# Patient Record
Sex: MALE | Race: WHITE | NOT HISPANIC OR LATINO | URBAN - METROPOLITAN AREA
[De-identification: names, ages, dates, MRNs, and addresses within clinical notes are randomized per-mention and may not be internally consistent; named-entity substitution may affect disease eponyms.]

---

## 2017-01-23 ENCOUNTER — OUTPATIENT (OUTPATIENT)
Dept: OUTPATIENT SERVICES | Facility: HOSPITAL | Age: 47
LOS: 1 days | Discharge: HOME | End: 2017-01-23

## 2017-06-27 DIAGNOSIS — F11.20 OPIOID DEPENDENCE, UNCOMPLICATED: ICD-10-CM

## 2019-10-11 ENCOUNTER — INPATIENT (INPATIENT)
Facility: HOSPITAL | Age: 49
LOS: 5 days | Discharge: NURSING, REHAB OR LONG-TERM CARE | End: 2019-10-17
Attending: INTERNAL MEDICINE | Admitting: INTERNAL MEDICINE
Payer: MEDICAID

## 2019-10-11 VITALS
HEART RATE: 123 BPM | RESPIRATION RATE: 24 BRPM | OXYGEN SATURATION: 97 % | SYSTOLIC BLOOD PRESSURE: 165 MMHG | TEMPERATURE: 98 F | DIASTOLIC BLOOD PRESSURE: 90 MMHG

## 2019-10-11 DIAGNOSIS — Z98.890 OTHER SPECIFIED POSTPROCEDURAL STATES: Chronic | ICD-10-CM

## 2019-10-11 LAB
ALBUMIN SERPL ELPH-MCNC: 4.3 G/DL — SIGNIFICANT CHANGE UP (ref 3.5–5.2)
ALP SERPL-CCNC: 117 U/L — HIGH (ref 30–115)
ALT FLD-CCNC: 84 U/L — HIGH (ref 0–41)
ANION GAP SERPL CALC-SCNC: 16 MMOL/L — HIGH (ref 7–14)
APPEARANCE UR: CLEAR — SIGNIFICANT CHANGE UP
APTT BLD: 31.2 SEC — SIGNIFICANT CHANGE UP (ref 27–39.2)
AST SERPL-CCNC: 144 U/L — HIGH (ref 0–41)
BASE EXCESS BLDV CALC-SCNC: 3.5 MMOL/L — HIGH (ref -2–2)
BASOPHILS # BLD AUTO: 0.01 K/UL — SIGNIFICANT CHANGE UP (ref 0–0.2)
BASOPHILS NFR BLD AUTO: 0.2 % — SIGNIFICANT CHANGE UP (ref 0–1)
BILIRUB SERPL-MCNC: 1.7 MG/DL — HIGH (ref 0.2–1.2)
BILIRUB UR-MCNC: NEGATIVE — SIGNIFICANT CHANGE UP
BLD GP AB SCN SERPL QL: SIGNIFICANT CHANGE UP
BUN SERPL-MCNC: 17 MG/DL — SIGNIFICANT CHANGE UP (ref 10–20)
CA-I SERPL-SCNC: 1.17 MMOL/L — SIGNIFICANT CHANGE UP (ref 1.12–1.3)
CALCIUM SERPL-MCNC: 9.3 MG/DL — SIGNIFICANT CHANGE UP (ref 8.5–10.1)
CHLORIDE SERPL-SCNC: 94 MMOL/L — LOW (ref 98–110)
CK SERPL-CCNC: 199 U/L — SIGNIFICANT CHANGE UP (ref 0–225)
CO2 SERPL-SCNC: 26 MMOL/L — SIGNIFICANT CHANGE UP (ref 17–32)
COLOR SPEC: YELLOW — SIGNIFICANT CHANGE UP
CREAT SERPL-MCNC: 0.9 MG/DL — SIGNIFICANT CHANGE UP (ref 0.7–1.5)
DIFF PNL FLD: NEGATIVE — SIGNIFICANT CHANGE UP
EOSINOPHIL # BLD AUTO: 0.04 K/UL — SIGNIFICANT CHANGE UP (ref 0–0.7)
EOSINOPHIL NFR BLD AUTO: 0.8 % — SIGNIFICANT CHANGE UP (ref 0–8)
ETHANOL SERPL-MCNC: 79 MG/DL — HIGH
GAS PNL BLDV: 139 MMOL/L — SIGNIFICANT CHANGE UP (ref 136–145)
GAS PNL BLDV: SIGNIFICANT CHANGE UP
GLUCOSE SERPL-MCNC: 130 MG/DL — HIGH (ref 70–99)
GLUCOSE UR QL: NEGATIVE — SIGNIFICANT CHANGE UP
HCO3 BLDV-SCNC: 30 MMOL/L — HIGH (ref 22–29)
HCT VFR BLD CALC: 40.8 % — LOW (ref 42–52)
HCT VFR BLDA CALC: 45.3 % — HIGH (ref 34–44)
HGB BLD CALC-MCNC: 14.8 G/DL — SIGNIFICANT CHANGE UP (ref 14–18)
HGB BLD-MCNC: 14.3 G/DL — SIGNIFICANT CHANGE UP (ref 14–18)
IMM GRANULOCYTES NFR BLD AUTO: 0.2 % — SIGNIFICANT CHANGE UP (ref 0.1–0.3)
INR BLD: 1.15 RATIO — SIGNIFICANT CHANGE UP (ref 0.65–1.3)
KETONES UR-MCNC: NEGATIVE — SIGNIFICANT CHANGE UP
LACTATE BLDV-MCNC: 3 MMOL/L — HIGH (ref 0.5–1.6)
LACTATE SERPL-SCNC: 2.8 MMOL/L — HIGH (ref 0.5–2.2)
LEUKOCYTE ESTERASE UR-ACNC: NEGATIVE — SIGNIFICANT CHANGE UP
LIDOCAIN IGE QN: 14 U/L — SIGNIFICANT CHANGE UP (ref 7–60)
LYMPHOCYTES # BLD AUTO: 3.18 K/UL — SIGNIFICANT CHANGE UP (ref 1.2–3.4)
LYMPHOCYTES # BLD AUTO: 60.6 % — HIGH (ref 20.5–51.1)
MCHC RBC-ENTMCNC: 32.4 PG — HIGH (ref 27–31)
MCHC RBC-ENTMCNC: 35 G/DL — SIGNIFICANT CHANGE UP (ref 32–37)
MCV RBC AUTO: 92.5 FL — SIGNIFICANT CHANGE UP (ref 80–94)
MONOCYTES # BLD AUTO: 0.56 K/UL — SIGNIFICANT CHANGE UP (ref 0.1–0.6)
MONOCYTES NFR BLD AUTO: 10.7 % — HIGH (ref 1.7–9.3)
NEUTROPHILS # BLD AUTO: 1.45 K/UL — SIGNIFICANT CHANGE UP (ref 1.4–6.5)
NEUTROPHILS NFR BLD AUTO: 27.5 % — LOW (ref 42.2–75.2)
NITRITE UR-MCNC: NEGATIVE — SIGNIFICANT CHANGE UP
NRBC # BLD: 0 /100 WBCS — SIGNIFICANT CHANGE UP (ref 0–0)
PCO2 BLDV: 53 MMHG — HIGH (ref 41–51)
PH BLDV: 7.36 — SIGNIFICANT CHANGE UP (ref 7.26–7.43)
PH UR: 6 — SIGNIFICANT CHANGE UP (ref 5–8)
PLATELET # BLD AUTO: 73 K/UL — LOW (ref 130–400)
PO2 BLDV: 17 MMHG — LOW (ref 20–40)
POTASSIUM BLDV-SCNC: 4 MMOL/L — SIGNIFICANT CHANGE UP (ref 3.3–5.6)
POTASSIUM SERPL-MCNC: 4.1 MMOL/L — SIGNIFICANT CHANGE UP (ref 3.5–5)
POTASSIUM SERPL-SCNC: 4.1 MMOL/L — SIGNIFICANT CHANGE UP (ref 3.5–5)
PROT SERPL-MCNC: 7 G/DL — SIGNIFICANT CHANGE UP (ref 6–8)
PROT UR-MCNC: SIGNIFICANT CHANGE UP
PROTHROM AB SERPL-ACNC: 13.2 SEC — HIGH (ref 9.95–12.87)
RBC # BLD: 4.41 M/UL — LOW (ref 4.7–6.1)
RBC # FLD: 13.7 % — SIGNIFICANT CHANGE UP (ref 11.5–14.5)
SAO2 % BLDV: 18 % — SIGNIFICANT CHANGE UP
SODIUM SERPL-SCNC: 136 MMOL/L — SIGNIFICANT CHANGE UP (ref 135–146)
SP GR SPEC: 1.05 — HIGH (ref 1.01–1.02)
UROBILINOGEN FLD QL: ABNORMAL
WBC # BLD: 5.25 K/UL — SIGNIFICANT CHANGE UP (ref 4.8–10.8)
WBC # FLD AUTO: 5.25 K/UL — SIGNIFICANT CHANGE UP (ref 4.8–10.8)

## 2019-10-11 PROCEDURE — 74174 CTA ABD&PLVS W/CONTRAST: CPT | Mod: 26

## 2019-10-11 PROCEDURE — 70450 CT HEAD/BRAIN W/O DYE: CPT | Mod: 26

## 2019-10-11 PROCEDURE — 71275 CT ANGIOGRAPHY CHEST: CPT | Mod: 26

## 2019-10-11 PROCEDURE — 99285 EMERGENCY DEPT VISIT HI MDM: CPT

## 2019-10-11 PROCEDURE — 72125 CT NECK SPINE W/O DYE: CPT | Mod: 26

## 2019-10-11 PROCEDURE — 99221 1ST HOSP IP/OBS SF/LOW 40: CPT

## 2019-10-11 PROCEDURE — 71045 X-RAY EXAM CHEST 1 VIEW: CPT | Mod: 26

## 2019-10-11 PROCEDURE — 72170 X-RAY EXAM OF PELVIS: CPT | Mod: 26

## 2019-10-11 RX ORDER — FOLIC ACID 0.8 MG
1 TABLET ORAL DAILY
Refills: 0 | Status: DISCONTINUED | OUTPATIENT
Start: 2019-10-11 | End: 2019-10-11

## 2019-10-11 RX ORDER — THIAMINE MONONITRATE (VIT B1) 100 MG
100 TABLET ORAL DAILY
Refills: 0 | Status: DISCONTINUED | OUTPATIENT
Start: 2019-10-11 | End: 2019-10-11

## 2019-10-11 RX ORDER — PROPOFOL 10 MG/ML
10 INJECTION, EMULSION INTRAVENOUS
Qty: 500 | Refills: 0 | Status: DISCONTINUED | OUTPATIENT
Start: 2019-10-11 | End: 2019-10-12

## 2019-10-11 RX ORDER — SODIUM CHLORIDE 9 MG/ML
1000 INJECTION, SOLUTION INTRAVENOUS
Refills: 0 | Status: DISCONTINUED | OUTPATIENT
Start: 2019-10-11 | End: 2019-10-12

## 2019-10-11 RX ORDER — THIAMINE MONONITRATE (VIT B1) 100 MG
500 TABLET ORAL ONCE
Refills: 0 | Status: COMPLETED | OUTPATIENT
Start: 2019-10-11 | End: 2019-10-11

## 2019-10-11 RX ORDER — MIDAZOLAM HYDROCHLORIDE 1 MG/ML
5 INJECTION, SOLUTION INTRAMUSCULAR; INTRAVENOUS ONCE
Refills: 0 | Status: DISCONTINUED | OUTPATIENT
Start: 2019-10-11 | End: 2019-10-11

## 2019-10-11 RX ORDER — PANTOPRAZOLE SODIUM 20 MG/1
40 TABLET, DELAYED RELEASE ORAL
Refills: 0 | Status: DISCONTINUED | OUTPATIENT
Start: 2019-10-11 | End: 2019-10-13

## 2019-10-11 RX ORDER — SODIUM CHLORIDE 9 MG/ML
1000 INJECTION INTRAMUSCULAR; INTRAVENOUS; SUBCUTANEOUS ONCE
Refills: 0 | Status: COMPLETED | OUTPATIENT
Start: 2019-10-11 | End: 2019-10-11

## 2019-10-11 RX ORDER — SODIUM CHLORIDE 9 MG/ML
1000 INJECTION INTRAMUSCULAR; INTRAVENOUS; SUBCUTANEOUS
Refills: 0 | Status: DISCONTINUED | OUTPATIENT
Start: 2019-10-11 | End: 2019-10-12

## 2019-10-11 RX ORDER — CHLORHEXIDINE GLUCONATE 213 G/1000ML
1 SOLUTION TOPICAL
Refills: 0 | Status: DISCONTINUED | OUTPATIENT
Start: 2019-10-11 | End: 2019-10-17

## 2019-10-11 RX ORDER — ENOXAPARIN SODIUM 100 MG/ML
40 INJECTION SUBCUTANEOUS DAILY
Refills: 0 | Status: DISCONTINUED | OUTPATIENT
Start: 2019-10-11 | End: 2019-10-14

## 2019-10-11 RX ORDER — TETANUS TOXOID, REDUCED DIPHTHERIA TOXOID AND ACELLULAR PERTUSSIS VACCINE, ADSORBED 5; 2.5; 8; 8; 2.5 [IU]/.5ML; [IU]/.5ML; UG/.5ML; UG/.5ML; UG/.5ML
0.5 SUSPENSION INTRAMUSCULAR ONCE
Refills: 0 | Status: COMPLETED | OUTPATIENT
Start: 2019-10-11 | End: 2019-10-11

## 2019-10-11 RX ORDER — PROPOFOL 10 MG/ML
10 INJECTION, EMULSION INTRAVENOUS
Qty: 1000 | Refills: 0 | Status: DISCONTINUED | OUTPATIENT
Start: 2019-10-11 | End: 2019-10-13

## 2019-10-11 RX ORDER — PROPOFOL 10 MG/ML
600 INJECTION, EMULSION INTRAVENOUS ONCE
Refills: 0 | Status: DISCONTINUED | OUTPATIENT
Start: 2019-10-11 | End: 2019-10-13

## 2019-10-11 RX ORDER — MIDAZOLAM HYDROCHLORIDE 1 MG/ML
0.05 INJECTION, SOLUTION INTRAMUSCULAR; INTRAVENOUS
Qty: 100 | Refills: 0 | Status: DISCONTINUED | OUTPATIENT
Start: 2019-10-11 | End: 2019-10-12

## 2019-10-11 RX ADMIN — Medication 2 MILLIGRAM(S): at 15:36

## 2019-10-11 RX ADMIN — Medication 1 MILLIGRAM(S): at 18:21

## 2019-10-11 RX ADMIN — MIDAZOLAM HYDROCHLORIDE 5 MILLIGRAM(S): 1 INJECTION, SOLUTION INTRAMUSCULAR; INTRAVENOUS at 07:00

## 2019-10-11 RX ADMIN — MIDAZOLAM HYDROCHLORIDE 5 MILLIGRAM(S): 1 INJECTION, SOLUTION INTRAMUSCULAR; INTRAVENOUS at 19:29

## 2019-10-11 RX ADMIN — Medication 1 TABLET(S): at 18:30

## 2019-10-11 RX ADMIN — Medication 2 MILLIGRAM(S): at 18:58

## 2019-10-11 RX ADMIN — TETANUS TOXOID, REDUCED DIPHTHERIA TOXOID AND ACELLULAR PERTUSSIS VACCINE, ADSORBED 0.5 MILLILITER(S): 5; 2.5; 8; 8; 2.5 SUSPENSION INTRAMUSCULAR at 08:07

## 2019-10-11 RX ADMIN — Medication 50 MILLIGRAM(S): at 10:57

## 2019-10-11 RX ADMIN — Medication 2 MILLIGRAM(S): at 18:22

## 2019-10-11 RX ADMIN — Medication 500 MILLIGRAM(S): at 12:00

## 2019-10-11 RX ADMIN — Medication 100 MILLIGRAM(S): at 18:30

## 2019-10-11 RX ADMIN — MIDAZOLAM HYDROCHLORIDE 5 MILLIGRAM(S): 1 INJECTION, SOLUTION INTRAMUSCULAR; INTRAVENOUS at 19:55

## 2019-10-11 RX ADMIN — Medication 0.1 MILLIGRAM(S): at 18:21

## 2019-10-11 RX ADMIN — SODIUM CHLORIDE 1000 MILLILITER(S): 9 INJECTION INTRAMUSCULAR; INTRAVENOUS; SUBCUTANEOUS at 07:15

## 2019-10-11 RX ADMIN — SODIUM CHLORIDE 1000 MILLILITER(S): 9 INJECTION INTRAMUSCULAR; INTRAVENOUS; SUBCUTANEOUS at 09:14

## 2019-10-11 RX ADMIN — ENOXAPARIN SODIUM 40 MILLIGRAM(S): 100 INJECTION SUBCUTANEOUS at 18:21

## 2019-10-11 NOTE — PHARMACOTHERAPY INTERVENTION NOTE - COMMENTS
I spoke with Dr Glover (spectra [id number]) and recommended to adjust ivp to ivpb- Dr Glover will change order
I spoke to md, he said the patient received already this dose, the total dose was given in subdivided doses

## 2019-10-11 NOTE — ED PROVIDER NOTE - ATTENDING CONTRIBUTION TO CARE
48 y/o M pmh polysubstance abuse, p/w head lac, s/p found at bottom of 13 flight stair s/p apparent unwitnessed fall. Family found pt awake, alert, confused, w/ cut to scalp and blood floor.     In ED pt is tachycardic, hypertensive, confused, somewhat combative but can be calmed. Scalp lac + brisk bleeding, hemostasis w/ staples. abc o/w intact; gcs 13 [id number], + bruising at lower back, appears intoxicated, shaking movements [id number] that stop when pt lies still;   FAST neg. cxr and pel xray neg.    Trauma alert called. Pt got versed for agitation.  IMP: intoxication, head lac, trauma.  P: labs, CT pan scan; ekg; ivf; tdap; reassess.

## 2019-10-11 NOTE — H&P ADULT - ASSESSMENT
49M with PMHx of DM, HTN, EtOh, GERD and opioid use presents with fall, hallucinations and agitations.     Alcohol and opioid withdrawal: Pt was drinking up to 30 drinks a day (15 24oz beers) and was taking suboxone 24mg from the street  - Detox consult  - CIWA protocol, stat dose now, will give standing for now  - Clonidine for opioid withdrawal  - IVF  - MVI, thiamine, folate  - Trend lactate    HTN: not on home meds, likely worsened by withdrawal  - Ativan/CIWA protocol  - Clonidine for withdrawal and HTN    DM: Not on home meds  - Monitor FS  - If FS >180 consistently, start basal bolus    Dysuria:   - Check UA    GERD:   - PPi    DVT ppx: lovenox  Dispo: Will likely need inpatient detox/rehab

## 2019-10-11 NOTE — ED ADULT NURSE NOTE - NSIMPLEMENTINTERV_GEN_ALL_ED
Implemented All Fall with Harm Risk Interventions:  [place] to call system. Call bell, personal items and telephone within reach. Instruct patient to call for assistance. Room bathroom lighting operational. Non-slip footwear when patient is off stretcher. Physically safe environment: no spills, clutter or unnecessary equipment. Stretcher in lowest position, wheels locked, appropriate side rails in place. Provide visual cue, wrist band, yellow gown, etc. Monitor gait and stability. Monitor for mental status changes and reorient to person, place, and time. Review medications for side effects contributing to fall risk. Reinforce activity limits and safety measures with patient and family. Provide visual clues: red socks.

## 2019-10-11 NOTE — H&P ADULT - HISTORY OF PRESENT ILLNESS
49M with PMHx of DM, HTN, EtOh and opioid use presents with fall, hallucinations and agitations. Pt is confused and a questionable historian. Patient was brought in by his sister following being found on the ground near stairs. Per sister, he was hallucinating and talking to inanimate objects last night and overall acting strangely, which was even worse this morning. Pt now endorses feeling "sick, like I had the flu" for the past several days, complained of fatigue and myalgias as well as abdominal cramping. He admits to drinking slightly less than normal because of these symptoms. Additionally, he gets suboxone on the street however he hasn't been able to get any recently. States he normally takes about 24mg.  In ED, , /106, afebrile, labs wnl. Pt had lac repaired with staples.

## 2019-10-11 NOTE — H&P ADULT - NSHPPHYSICALEXAM_GEN_ALL_CORE
General: Agitated  HENT: NCAT, EOMI  Pulm: CTAB, no accessory muscle use, no respiratory distress  CVS: Tachycardic  GI: Soft, NT, ND  Extremities: no edema  Neuro: tremulous, agitated, AAOx2/3

## 2019-10-11 NOTE — CONSULT NOTE ADULT - SUBJECTIVE AND OBJECTIVE BOX
TRAUMA ACTIVATION LEVEL:  ALERT    MECHANISM OF INJURY:   [x] Fall	    GCS: 15 	E: 4	V: 5	M: 6    HPI:  49M PMHx  HTN, DM Type 2, Suboxone use, alcohol abuse, alcohol withdrawal, p/w fall. Pt found at floor of stairs earlier this AM by sister, with bleeding from posterior scalp wound . Pt was confused last night, speaking to inanimate objects and not making sense.    PAST MEDICAL & SURGICAL HISTORY:  DM (diabetes mellitus)  HTN (hypertension)    AllergiesNo Known Allergies      ROS: 10-system review is otherwise negative except HPI above.      Primary Survey:    A - airway intact  B - bilateral breath sounds and good chest rise  C - palpable pulses in all extremities  D - GCS 15 on arrival, PALOMO  Exposure obtained    Vital Signs Last 24 Hrs  T(C): 36.7 (11 Oct 2019 07:00), Max: 36.7 (11 Oct 2019 07:00)  T(F): 98 (11 Oct 2019 07:00), Max: 98 (11 Oct 2019 07:00)  HR: 107 (11 Oct 2019 07:55) (107 - 128)  BP: 156/93 (11 Oct 2019 07:55) (148/93 - 180/106)  RR: 19 (11 Oct 2019 08:03) (19 - 25)  SpO2: 98% (11 Oct 2019 07:55) (97% - 98%)    Secondary Survey:   General: NAD  HEENT: Normocephalic, EOMI, PEERLA. + 4cm scalp lacerations posterior scalp, bleeding controlled with staples.   Neck: Soft, midline trachea. no c-spine tenderness  Chest: No chest wall tenderness, no subcutaneous emphysema   Cardiac: S1, S2, Tachy  Respiratory: Bilateral breath sounds, clear and equal bilaterally  Abdomen: Soft, non-distended, non-tender, no rebound.  Groin: Normal appearing, pelvis stable   Ext: Palpable Radial b/l UE, b/l DP palpable in LE.   Back: No TTP, No palpable runoff/stepoff/deformity  Rectal: No derek blood, MUMTAZ with good tone    Labs:  CAPILLARY BLOOD GLUCOSE  POCT Blood Glucose.: 133 mg/dL (11 Oct 2019 07:16)             14.3   5.25  )-----------( 73       ( 11 Oct 2019 07:04 )             40.8       Auto Neutrophil %: 27.5 % (10-11-19 @ 07:04)  Auto Immature Granulocyte %: 0.2 % (10-11-19 @ 07:04)    10-11    136  |  94<L>  |  17  ----------------------------<  130<H>  4.1   |  26  |  0.9    Calcium, Total Serum: 9.3 mg/dL (10-11-19 @ 07:04)    LFTs:             7.0  | 1.7  | 144      ------------------[117     ( 11 Oct 2019 07:04 )  4.3  | x    | 84          Lipase:14       Blood Gas Venous - Lactate: 3.0 mmoL/L (10-11-19 @ 07:39)  Lactate, Blood: 2.8 mmol/L (10-11-19 @ 07:04)    Coags:     13.20  ----< 1.15    ( 11 Oct 2019 07:04 )     31.2     CARDIAC MARKERS ( 11 Oct 2019 07:04 )  x     / x     / 199 U/L / x     / x        Alcohol, Blood: 79 mg/dL (10-11-19 @ 07:04)  Alcohol, Blood: 79 mg/dL (10-11-19 @ 07:04)    RADIOLOGY & ADDITIONAL STUDIES:    CT Head No Cont (10.11.19 @ 07:55) >  IMPRESSION:  No acute intracranial hemorrhage.    CT Cervical Spine No Cont (10.11.19 @ 07:56) >  IMPRESSION:  1.  No acute fracture demonstrated.  2.  Straightening of the normal cervical lordosis may be secondary to   patient positioning or muscle spasm.  3.  Degenerative changes.    CT Angio Chest w/ IV Cont (10.11.19 @ 08:05) >  IMPRESSION:   Venous phase degraded by motion.  1. No evidence of of acute traumatic intrathoracic or intra-abdominal   pathology.  2. Hepatic steatosis.    CT Angio Pelvis w/ IV Cont (10.11.19 @ 08:06) >  IMPRESSION:   Venous phase degraded by motion.  1. No evidence of of acute traumatic intrathoracic or intra-abdominal   pathology.  2. Hepatic steatosis.  ---------------------------------------------------------------------------------------  ASSESSMENT:  49M PMHx of HTN, DM Type 2, Suboxone use, alcohol abuse, alcohol withdrawal, p/w fall. Pt found at floor of stairs earlier this AM by sister, with bleeding from posterior scalp wound . Pt was confused last night, speaking to inanimate objects and not making sense. Trauma assessment in ED: ABCs intact, + 4cm posterior scalp lacerations , bleeding controlled with staples with physical exam findings, imaging, and labs as documented above:     PLAN:    - No Acute Traumatic Injuries, Cleared from Trauma. Disposition as per ED TRAUMA ACTIVATION LEVEL:  ALERT    MECHANISM OF INJURY:   [x] Fall	    GCS: 13 	E: 4	V: 3	M: 6    HPI:  49M PMHx  HTN, DM Type 2, Suboxone use, alcohol abuse, alcohol withdrawal, p/w fall. Pt found at floor of stairs earlier this AM by sister, with bleeding from posterior scalp wound . Pt was confused last night, speaking to inanimate objects and not making sense.    PAST MEDICAL & SURGICAL HISTORY:  DM (diabetes mellitus)  HTN (hypertension)    AllergiesNo Known Allergies      ROS: 10-system review is otherwise negative except HPI above.      Primary Survey:    A - airway intact  B - bilateral breath sounds and good chest rise  C - palpable pulses in all extremities  D - GCS 15 on arrival, PALOMO  Exposure obtained    Vital Signs Last 24 Hrs  T(C): 36.7 (11 Oct 2019 07:00), Max: 36.7 (11 Oct 2019 07:00)  T(F): 98 (11 Oct 2019 07:00), Max: 98 (11 Oct 2019 07:00)  HR: 107 (11 Oct 2019 07:55) (107 - 128)  BP: 156/93 (11 Oct 2019 07:55) (148/93 - 180/106)  RR: 19 (11 Oct 2019 08:03) (19 - 25)  SpO2: 98% (11 Oct 2019 07:55) (97% - 98%)    Secondary Survey:   General: NAD  HEENT: Normocephalic, EOMI, PEERLA. + 4cm scalp lacerations posterior scalp, bleeding controlled with staples.   Neck: Soft, midline trachea. no c-spine tenderness  Chest: No chest wall tenderness, no subcutaneous emphysema   Cardiac: S1, S2, Tachy  Respiratory: Bilateral breath sounds, clear and equal bilaterally  Abdomen: Soft, non-distended, non-tender, no rebound.  Groin: Normal appearing, pelvis stable   Ext: Palpable Radial b/l UE, b/l DP palpable in LE.   Back: No TTP, No palpable runoff/stepoff/deformity  Rectal: No derek blood, MUMTAZ with good tone    Labs:  CAPILLARY BLOOD GLUCOSE  POCT Blood Glucose.: 133 mg/dL (11 Oct 2019 07:16)             14.3   5.25  )-----------( 73       ( 11 Oct 2019 07:04 )             40.8       Auto Neutrophil %: 27.5 % (10-11-19 @ 07:04)  Auto Immature Granulocyte %: 0.2 % (10-11-19 @ 07:04)    10-11    136  |  94<L>  |  17  ----------------------------<  130<H>  4.1   |  26  |  0.9    Calcium, Total Serum: 9.3 mg/dL (10-11-19 @ 07:04)    LFTs:             7.0  | 1.7  | 144      ------------------[117     ( 11 Oct 2019 07:04 )  4.3  | x    | 84          Lipase:14       Blood Gas Venous - Lactate: 3.0 mmoL/L (10-11-19 @ 07:39)  Lactate, Blood: 2.8 mmol/L (10-11-19 @ 07:04)    Coags:     13.20  ----< 1.15    ( 11 Oct 2019 07:04 )     31.2     CARDIAC MARKERS ( 11 Oct 2019 07:04 )  x     / x     / 199 U/L / x     / x        Alcohol, Blood: 79 mg/dL (10-11-19 @ 07:04)  Alcohol, Blood: 79 mg/dL (10-11-19 @ 07:04)    RADIOLOGY & ADDITIONAL STUDIES:    CT Head No Cont (10.11.19 @ 07:55) >  IMPRESSION:  No acute intracranial hemorrhage.    CT Cervical Spine No Cont (10.11.19 @ 07:56) >  IMPRESSION:  1.  No acute fracture demonstrated.  2.  Straightening of the normal cervical lordosis may be secondary to   patient positioning or muscle spasm.  3.  Degenerative changes.    CT Angio Chest w/ IV Cont (10.11.19 @ 08:05) >  IMPRESSION:   Venous phase degraded by motion.  1. No evidence of of acute traumatic intrathoracic or intra-abdominal   pathology.  2. Hepatic steatosis.    CT Angio Pelvis w/ IV Cont (10.11.19 @ 08:06) >  IMPRESSION:   Venous phase degraded by motion.  1. No evidence of of acute traumatic intrathoracic or intra-abdominal   pathology.  2. Hepatic steatosis.  ---------------------------------------------------------------------------------------  ASSESSMENT:  49M PMHx of HTN, DM Type 2, Suboxone use, alcohol abuse, alcohol withdrawal, p/w fall. Pt found at floor of stairs earlier this AM by sister, with bleeding from posterior scalp wound . Pt was confused last night, speaking to inanimate objects and not making sense. Trauma assessment in ED: ABCs intact, GCS 13,  + 4cm posterior scalp lacerations , bleeding controlled with staples with physical exam findings, imaging, and labs as documented above:     PLAN:    - No Acute Traumatic Injuries, Cleared from Trauma. Disposition as per ED

## 2019-10-11 NOTE — ED PROVIDER NOTE - UNABLE TO OBTAIN
Urgent need for Intervention Altered, trauma alert AMS from trauma or alcohol/drug intoxication Severe Illness/Injury

## 2019-10-11 NOTE — H&P ADULT - NSHPSOCIALHISTORY_GEN_ALL_CORE
Current alcohol use: up to 30 beers a day (15 24oz, never less than 8 a day)  Hx of oxycodone abuse, now using suboxone 24 mg  Pack a day smoker for 10 years, active

## 2019-10-11 NOTE — ED PROVIDER NOTE - OBJECTIVE STATEMENT
49M h/o HTN, DM Type 2, suboxone use, alcohol abuse, alcohol withdrawal, p/w fall. Pt found at floor of stairs earlier this AM by sister. Pt was confused last night, speaking to inanimate objects and not making sense. Pt agitated at this time.

## 2019-10-11 NOTE — H&P ADULT - ATTENDING COMMENTS
PHYSICAL EXAM:    CONSTITUTIONAL: NAD, intubated  ENMT: PERRLA, No tonsillar erythema, exudates, or enlargement, neck supple, No JVD  PSYCH: sedated, intubated  RESPIRATORY: Clear to percussion bilaterally; No rales, rhonchi, wheezing, or rubs  CARDIOVASCULAR: Regular rate and rhythm; No murmurs, rubs, or gallops, negative edema  GASTROINTESTINAL: Soft, Nontender, Nondistended; Bowel sounds present  EXTREMITIES:  2+ Peripheral Pulses, No clubbing, cyanosis  SKIN: posterior scalp laceration    50 yo M with PMHx of Polysubstance Abuse, ETOH Abuse, Active Smoker, DM II, HTN, brought in s/p found, by sister, down on the ground by 13 step flight of stairs. As per ED documentation, as per sister, patient had been hallucinating and talking to inanimate objects since last night. Patient reported endorsed that he was feeling unwell. In ED, patient was combative, tachycardic, hypertensive, confused and desaturated, was subsequently intubated for airway control.     #Acute Alcohol Intoxication, possible Opioid Withdrawal (ED documentation states patient had been unable to obtain Suboxone lately, which he purchases on the street)  -Pan scan imaging noted  -MICU monitoring  -Continue CIWA monitoring and Ativan protocol  -Awaiting Detoxification evaluation   -F/U Urine and drug screen, monitor electrolytes    #Transaminitis likely secondary to hepatic steatosis   -Monitor LFTs  -Outpatient Gastroenterology follow up/monitoring  #Suspected Thiamine and Folate deficiency  -Continue supplementation     #Lactic Acidosis   -s/p 2L NS, and 1L LR bolus   -Trend level in AM    #HTN  -Not on home medications vs. noncompliance  -on Clonidine currently for concurrent withdrawal      #DM II  -Monitor fingersticks  -Check HgbA1C%  -Start insulin drip if needed in MICU    #GERD   -Continue protonix    Disposition: Detoxification evaluation, will need [facility] clinic arrangement prior to discharge to establish primary care Attempted to contact Sister, Patricia Knobloch, at [phone] at 2:05AM with no success.     PHYSICAL EXAM:    CONSTITUTIONAL: NAD, intubated  ENMT: PERRLA, No tonsillar erythema, exudates, or enlargement, neck supple, No JVD  PSYCH: sedated, intubated  RESPIRATORY: Clear to percussion bilaterally; No rales, rhonchi, wheezing, or rubs  CARDIOVASCULAR: Regular rate and rhythm; No murmurs, rubs, or gallops, negative edema  GASTROINTESTINAL: Soft, Nontender, Nondistended; Bowel sounds present  EXTREMITIES:  2+ Peripheral Pulses, No clubbing, cyanosis  SKIN: posterior scalp laceration    48 yo M with PMHx of Polysubstance Abuse, ETOH Abuse, Active Smoker, DM II, HTN, brought in s/p found, by sister, down on the ground by 13 step flight of stairs. As per ED documentation, as per sister, patient had been hallucinating and talking to inanimate objects since last night. Patient reported endorsed that he was feeling unwell. In ED, patient was combative, tachycardic, hypertensive, confused and desaturated, was subsequently intubated for airway control.     #Acute Alcohol Withdrawal, Delirium Tremens, possible concurrent Opioid Withdrawal (ED documentation states patient had been unable to obtain Suboxone lately, which he purchases on the street)  -Pan scan imaging noted  -MICU monitoring  -Continue CIWA monitoring and Ativan protocol  -Awaiting Detoxification evaluation   -Toxicology recommendations noted  -F/U Urine and drug screen, monitor electrolytes    #Transaminitis likely secondary to hepatic steatosis   -Monitor LFTs  -Outpatient Gastroenterology follow up/monitoring  #Suspected Thiamine and Folate deficiency  -Continue supplementation     #Lactic Acidosis   -s/p 2L NS, and 1L LR bolus   -Trend level in AM    #HTN  -Not on home medications vs. noncompliance  -on Clonidine currently for concurrent withdrawal      #DM II  -Monitor fingersticks  -Check HgbA1C%  -Start insulin drip if needed in MICU    #GERD   -Continue protonix    Disposition: Detoxification evaluation, will need [facility] clinic arrangement prior to discharge to establish primary care Attempted to contact Sister, Patricia Knobloch, at [phone] at 2:05AM with no success.     PHYSICAL EXAM:    CONSTITUTIONAL: NAD, intubated  ENMT: PERRLA, No tonsillar erythema, exudates, or enlargement, neck supple, No JVD  PSYCH: sedated, intubated  RESPIRATORY: Clear to percussion bilaterally; No rales, rhonchi, wheezing, or rubs  CARDIOVASCULAR: Regular rate and rhythm; No murmurs, rubs, or gallops, negative edema  GASTROINTESTINAL: Soft, Nontender, Nondistended; Bowel sounds present  EXTREMITIES:  2+ Peripheral Pulses, No clubbing, cyanosis  SKIN: posterior scalp laceration    50 yo M with PMHx of Polysubstance Abuse, ETOH Abuse, Active Smoker, DM II, HTN, brought in s/p found, by sister, down on the ground by 13 step flight of stairs. As per ED documentation, as per sister, patient had been hallucinating and talking to inanimate objects since last night. Patient reported endorsed that he was feeling unwell. In ED, patient was combative, tachycardic, hypertensive, confused and desaturated, was subsequently intubated for airway control.     #Acute Alcohol Withdrawal, Delirium Tremens, possible concurrent Opioid Withdrawal (ED documentation states patient had been unable to obtain Suboxone lately, which he purchases on the street)  -Pan scan imaging noted  -MICU monitoring  -Continue CIWA monitoring and Ativan protocol  -Awaiting Detoxification evaluation   -Toxicology recommendations noted  -F/U Urine and drug screen, monitor electrolytes    #Transaminitis likely secondary to hepatic steatosis   -Monitor LFTs  -Outpatient Gastroenterology follow up/monitoring    #Suspected Thiamine and Folate deficiency  -Continue supplementation     #Lactic Acidosis   -s/p 2L NS, and 1L LR bolus   -Trend level in AM    #HTN  -Not on home medications vs. noncompliance  -on Clonidine currently for concurrent withdrawal      #DM II  -Monitor fingersticks  -Check HgbA1C%  -Start insulin drip if needed in MICU    #GERD   -Continue protonix    Disposition: Detoxification evaluation, will need [facility] clinic arrangement prior to discharge to establish primary care

## 2019-10-11 NOTE — ED PROVIDER NOTE - CARE PLAN
Principal Discharge DX:	Alcohol withdrawal  Secondary Diagnosis:	Altered mental status Principal Discharge DX:	Alcohol withdrawal delirium  Secondary Diagnosis:	Altered mental status  Secondary Diagnosis:	Scalp laceration, initial encounter

## 2019-10-11 NOTE — ED PROVIDER NOTE - PROGRESS NOTE DETAILS
Family endorses that pt has shaking motions of extem at baseline; Has seemed confused past couple days; could be heard talking this 4am, confused.  Pt remains clinically stable and relatively calm. Pt endorsed to Dr. Glover Pt still confused, will give thiamine and librium and reassess.

## 2019-10-11 NOTE — H&P ADULT - NSHPLABSRESULTS_GEN_ALL_CORE
14.3   5.25  )-----------( 73       ( 11 Oct 2019 07:04 )             40.8   10-11    136  |  94<L>  |  17  ----------------------------<  130<H>  4.1   |  26  |  0.9    Ca    9.3      11 Oct 2019 07:04    TPro  7.0  /  Alb  4.3  /  TBili  1.7<H>  /  DBili  x   /  AST  144<H>  /  ALT  84<H>  /  AlkPhos  117<H>  10-11

## 2019-10-11 NOTE — CHART NOTE - NSCHARTNOTEFT_GEN_A_CORE
49M with PMHx of DM, HTN, EtOh and opioid use presents with fall, hallucinations and agitations attempted for suspected Called by RN as patient 49M with PMHx of DM, HTN, EtOh and opioid use presents with fall, hallucinations and agitations and admitted for alcohol withdrawal. Called by RN as patient 49M with PMHx of DM, HTN, EtOh and opioid use presents with fall, hallucinations and agitations and admitted for alcohol withdrawal. Called by RN as patient agitated, gave stat IV ativan dose. Patient became combative with staff, going into other patient rooms, posing a danger to himself, staff and other patients. Code grey called and security restrained patient with 4 point restraints. At this time CIWA greater than 30. Toxicology attending consulted. Patient given 5mg IM versed subsequent to which he became more calm however still occasionally fighting restraints. After 20 minutes received another 5mg IM versed. At this point patient well sedated. ICU consulted and discussed with fellow. Patient found to not be candidate for MICU monitoring.     Plan:   - CIWA protocol, standing taper for now - f/u official toxicology note  - Dextrose in NS at 75  - Closely monitor rectal temp q1-2 hrs  - F/u official toxicology consult  - If patient CIWA increases without response to ativan reach out to ICU team for re-evaluation

## 2019-10-11 NOTE — CONSULT NOTE ADULT - ATTENDING COMMENTS
I examined the patient and discussed my plan with the resident    the patient has no serious traumatic injuries and does not warrant trauma admission

## 2019-10-11 NOTE — ED PROVIDER NOTE - CLINICAL SUMMARY MEDICAL DECISION MAKING FREE TEXT BOX
Patient had a trauma work up done that did not show any signs of serious traumatic injury.  Patient cleared by trauma and laceration repaired with staples. Sisters spoken to about wound care and need to return in one week for staple removal. Before fall, patient was talking to a coat hanged like it was a person.  He previously has had acute delirium. Family denies any recent fever. Sisters spoken to in detail and it seems this is most likely from alcohol/drugs or drug withdrawal. They do not know what medications or drugs he uses, but do state he drinks heavily daily and has had alcohol withdrawal in the past. Given high risk for alcohol withdrawal delirium, patient given Librium and Thiamine IV. At this time, it is unsafe to discharge patient and he is not back to baseline.  Will admit for alcohol withdrawal and delirium to Telemetry. Patient has no nuchal rigidity or physical exam findings suggestive of bacterial meningitis.

## 2019-10-11 NOTE — ED ADULT TRIAGE NOTE - CHIEF COMPLAINT QUOTE
B[facility]A [facility] s/p unwitnessed fall with uncontrolled bleeding to back of his head. alcohol noted on pt breath. pt agitated and combative. Trauma alert initiated.

## 2019-10-11 NOTE — ED ADULT NURSE NOTE - OBJECTIVE STATEMENT
pt s/p unwitnessed fall, found at bottom of stairs by sister. BIBA with laceration and uncontrolled bleeding to back of head. Pt combative, agitated, and uncooperative. Alcohol smell noted to pt's breathe. sister at bedside. pt sister unware of all of pt's PMH GCS 14. bruising also noted to pt's lower back. unable to obtain pt's pmh from pt. unknown if pt is on blood thinners.

## 2019-10-11 NOTE — ED PROVIDER NOTE - PHYSICAL EXAMINATION
Constitutional: Intoxicated, confused.   Head: 4cm laceration to occiput, actively bleeding.  Eyes: PERRLA. EOMI without discomfort.   ENT: No nasal discharge. Mucous membranes moist.  Neck: Supple. Painless ROM.  Cardiovascular: Regular rhythm. Regular rate. Normal S1 and S2. No murmurs. 2+ pulses in all extremities.   Pulmonary: Normal respiratory rate and effort. Lungs clear to auscultation bilaterally. No wheezing, rales, or rhonchi. Bilateral, equal lung expansion.   Abdominal: Soft. Nondistended. Nontender. No rebound or guarding.   Extremities. Pelvis stable. No lower extremity edema. Symmetric calves.  Back: Ecchymosis on left buttocks.   Skin: No rashes.   Neuro: AAOX1. Moving all extremities spontaneously.   Psych: Agitated, confused. No SI/HI.

## 2019-10-12 LAB
ALBUMIN SERPL ELPH-MCNC: 3.2 G/DL — LOW (ref 3.5–5.2)
ALBUMIN SERPL ELPH-MCNC: 3.3 G/DL — LOW (ref 3.5–5.2)
ALP SERPL-CCNC: 77 U/L — SIGNIFICANT CHANGE UP (ref 30–115)
ALP SERPL-CCNC: 81 U/L — SIGNIFICANT CHANGE UP (ref 30–115)
ALT FLD-CCNC: 56 U/L — HIGH (ref 0–41)
ALT FLD-CCNC: 62 U/L — HIGH (ref 0–41)
ANION GAP SERPL CALC-SCNC: 14 MMOL/L — SIGNIFICANT CHANGE UP (ref 7–14)
ANION GAP SERPL CALC-SCNC: 9 MMOL/L — SIGNIFICANT CHANGE UP (ref 7–14)
AST SERPL-CCNC: 102 U/L — HIGH (ref 0–41)
AST SERPL-CCNC: 94 U/L — HIGH (ref 0–41)
BASOPHILS # BLD AUTO: 0.01 K/UL — SIGNIFICANT CHANGE UP (ref 0–0.2)
BASOPHILS NFR BLD AUTO: 0.2 % — SIGNIFICANT CHANGE UP (ref 0–1)
BILIRUB DIRECT SERPL-MCNC: 1.8 MG/DL — HIGH (ref 0–0.2)
BILIRUB INDIRECT FLD-MCNC: 1.7 MG/DL — HIGH (ref 0.2–1.2)
BILIRUB SERPL-MCNC: 3.2 MG/DL — HIGH (ref 0.2–1.2)
BILIRUB SERPL-MCNC: 3.5 MG/DL — HIGH (ref 0.2–1.2)
BUN SERPL-MCNC: 13 MG/DL — SIGNIFICANT CHANGE UP (ref 10–20)
BUN SERPL-MCNC: 16 MG/DL — SIGNIFICANT CHANGE UP (ref 10–20)
CALCIUM SERPL-MCNC: 8 MG/DL — LOW (ref 8.5–10.1)
CALCIUM SERPL-MCNC: 8.2 MG/DL — LOW (ref 8.5–10.1)
CHLORIDE SERPL-SCNC: 100 MMOL/L — SIGNIFICANT CHANGE UP (ref 98–110)
CHLORIDE SERPL-SCNC: 101 MMOL/L — SIGNIFICANT CHANGE UP (ref 98–110)
CO2 SERPL-SCNC: 22 MMOL/L — SIGNIFICANT CHANGE UP (ref 17–32)
CO2 SERPL-SCNC: 25 MMOL/L — SIGNIFICANT CHANGE UP (ref 17–32)
CREAT SERPL-MCNC: 0.8 MG/DL — SIGNIFICANT CHANGE UP (ref 0.7–1.5)
CREAT SERPL-MCNC: 0.8 MG/DL — SIGNIFICANT CHANGE UP (ref 0.7–1.5)
EOSINOPHIL # BLD AUTO: 0.01 K/UL — SIGNIFICANT CHANGE UP (ref 0–0.7)
EOSINOPHIL NFR BLD AUTO: 0.2 % — SIGNIFICANT CHANGE UP (ref 0–8)
GLUCOSE BLDC GLUCOMTR-MCNC: 193 MG/DL — HIGH (ref 70–99)
GLUCOSE SERPL-MCNC: 180 MG/DL — HIGH (ref 70–99)
GLUCOSE SERPL-MCNC: 193 MG/DL — HIGH (ref 70–99)
HCT VFR BLD CALC: 30.1 % — LOW (ref 42–52)
HGB BLD-MCNC: 10 G/DL — LOW (ref 14–18)
HIV 1+2 AB+HIV1 P24 AG SERPL QL IA: SIGNIFICANT CHANGE UP
IMM GRANULOCYTES NFR BLD AUTO: 0.4 % — HIGH (ref 0.1–0.3)
LACTATE SERPL-SCNC: 0.9 MMOL/L — SIGNIFICANT CHANGE UP (ref 0.5–2.2)
LYMPHOCYTES # BLD AUTO: 0.89 K/UL — LOW (ref 1.2–3.4)
LYMPHOCYTES # BLD AUTO: 15.7 % — LOW (ref 20.5–51.1)
MAGNESIUM SERPL-MCNC: 1.8 MG/DL — SIGNIFICANT CHANGE UP (ref 1.8–2.4)
MANUAL SMEAR VERIFICATION: SIGNIFICANT CHANGE UP
MCHC RBC-ENTMCNC: 32.1 PG — HIGH (ref 27–31)
MCHC RBC-ENTMCNC: 33.2 G/DL — SIGNIFICANT CHANGE UP (ref 32–37)
MCV RBC AUTO: 96.5 FL — HIGH (ref 80–94)
MONOCYTES # BLD AUTO: 0.45 K/UL — SIGNIFICANT CHANGE UP (ref 0.1–0.6)
MONOCYTES NFR BLD AUTO: 8 % — SIGNIFICANT CHANGE UP (ref 1.7–9.3)
NEUTROPHILS # BLD AUTO: 4.28 K/UL — SIGNIFICANT CHANGE UP (ref 1.4–6.5)
NEUTROPHILS NFR BLD AUTO: 75.5 % — HIGH (ref 42.2–75.2)
NRBC # BLD: 0 /100 WBCS — SIGNIFICANT CHANGE UP (ref 0–0)
PLAT MORPH BLD: SIGNIFICANT CHANGE UP
PLATELET # BLD AUTO: 34 K/UL — LOW (ref 130–400)
POTASSIUM SERPL-MCNC: 4 MMOL/L — SIGNIFICANT CHANGE UP (ref 3.5–5)
POTASSIUM SERPL-MCNC: 4.1 MMOL/L — SIGNIFICANT CHANGE UP (ref 3.5–5)
POTASSIUM SERPL-SCNC: 4 MMOL/L — SIGNIFICANT CHANGE UP (ref 3.5–5)
POTASSIUM SERPL-SCNC: 4.1 MMOL/L — SIGNIFICANT CHANGE UP (ref 3.5–5)
PROT SERPL-MCNC: 5.2 G/DL — LOW (ref 6–8)
PROT SERPL-MCNC: 5.5 G/DL — LOW (ref 6–8)
RBC # BLD: 3.12 M/UL — LOW (ref 4.7–6.1)
RBC # FLD: 13 % — SIGNIFICANT CHANGE UP (ref 11.5–14.5)
RBC BLD AUTO: ABNORMAL
SODIUM SERPL-SCNC: 135 MMOL/L — SIGNIFICANT CHANGE UP (ref 135–146)
SODIUM SERPL-SCNC: 136 MMOL/L — SIGNIFICANT CHANGE UP (ref 135–146)
STOMATOCYTES BLD QL SMEAR: SIGNIFICANT CHANGE UP
WBC # BLD: 5.66 K/UL — SIGNIFICANT CHANGE UP (ref 4.8–10.8)
WBC # FLD AUTO: 5.66 K/UL — SIGNIFICANT CHANGE UP (ref 4.8–10.8)

## 2019-10-12 PROCEDURE — 99223 1ST HOSP IP/OBS HIGH 75: CPT | Mod: AI

## 2019-10-12 PROCEDURE — 99255 IP/OBS CONSLTJ NEW/EST HI 80: CPT

## 2019-10-12 PROCEDURE — 71045 X-RAY EXAM CHEST 1 VIEW: CPT | Mod: 26

## 2019-10-12 RX ORDER — INFLUENZA VIRUS VACCINE 15; 15; 15; 15 UG/.5ML; UG/.5ML; UG/.5ML; UG/.5ML
0.5 SUSPENSION INTRAMUSCULAR ONCE
Refills: 0 | Status: COMPLETED | OUTPATIENT
Start: 2019-10-12 | End: 2019-10-17

## 2019-10-12 RX ORDER — AMPICILLIN SODIUM AND SULBACTAM SODIUM 250; 125 MG/ML; MG/ML
INJECTION, POWDER, FOR SUSPENSION INTRAMUSCULAR; INTRAVENOUS
Refills: 0 | Status: DISCONTINUED | OUTPATIENT
Start: 2019-10-12 | End: 2019-10-16

## 2019-10-12 RX ORDER — AMPICILLIN SODIUM AND SULBACTAM SODIUM 250; 125 MG/ML; MG/ML
3 INJECTION, POWDER, FOR SUSPENSION INTRAMUSCULAR; INTRAVENOUS EVERY 6 HOURS
Refills: 0 | Status: DISCONTINUED | OUTPATIENT
Start: 2019-10-12 | End: 2019-10-16

## 2019-10-12 RX ORDER — FENTANYL CITRATE 50 UG/ML
0.5 INJECTION INTRAVENOUS
Qty: 2500 | Refills: 0 | Status: DISCONTINUED | OUTPATIENT
Start: 2019-10-12 | End: 2019-10-13

## 2019-10-12 RX ORDER — THIAMINE MONONITRATE (VIT B1) 100 MG
250 TABLET ORAL DAILY
Refills: 0 | Status: COMPLETED | OUTPATIENT
Start: 2019-10-12 | End: 2019-10-16

## 2019-10-12 RX ORDER — FOLIC ACID 0.8 MG
1 TABLET ORAL DAILY
Refills: 0 | Status: DISCONTINUED | OUTPATIENT
Start: 2019-10-12 | End: 2019-10-17

## 2019-10-12 RX ORDER — ACETAMINOPHEN 500 MG
650 TABLET ORAL EVERY 6 HOURS
Refills: 0 | Status: DISCONTINUED | OUTPATIENT
Start: 2019-10-12 | End: 2019-10-13

## 2019-10-12 RX ORDER — AMPICILLIN SODIUM AND SULBACTAM SODIUM 250; 125 MG/ML; MG/ML
3 INJECTION, POWDER, FOR SUSPENSION INTRAMUSCULAR; INTRAVENOUS ONCE
Refills: 0 | Status: COMPLETED | OUTPATIENT
Start: 2019-10-12 | End: 2019-10-12

## 2019-10-12 RX ORDER — THIAMINE MONONITRATE (VIT B1) 100 MG
100 TABLET ORAL DAILY
Refills: 0 | Status: DISCONTINUED | OUTPATIENT
Start: 2019-10-17 | End: 2019-10-17

## 2019-10-12 RX ADMIN — Medication 4 MILLIGRAM(S): at 18:06

## 2019-10-12 RX ADMIN — Medication 4 MILLIGRAM(S): at 02:32

## 2019-10-12 RX ADMIN — FENTANYL CITRATE 4.54 MICROGRAM(S)/KG/HR: 50 INJECTION INTRAVENOUS at 10:37

## 2019-10-12 RX ADMIN — Medication 102.5 MILLIGRAM(S): at 14:30

## 2019-10-12 RX ADMIN — AMPICILLIN SODIUM AND SULBACTAM SODIUM 200 GRAM(S): 250; 125 INJECTION, POWDER, FOR SUSPENSION INTRAMUSCULAR; INTRAVENOUS at 11:06

## 2019-10-12 RX ADMIN — ENOXAPARIN SODIUM 40 MILLIGRAM(S): 100 INJECTION SUBCUTANEOUS at 14:28

## 2019-10-12 RX ADMIN — Medication 4 MILLIGRAM(S): at 10:38

## 2019-10-12 RX ADMIN — PROPOFOL 600 MILLIGRAM(S): 10 INJECTION, EMULSION INTRAVENOUS at 00:00

## 2019-10-12 RX ADMIN — AMPICILLIN SODIUM AND SULBACTAM SODIUM 200 GRAM(S): 250; 125 INJECTION, POWDER, FOR SUSPENSION INTRAMUSCULAR; INTRAVENOUS at 18:07

## 2019-10-12 RX ADMIN — Medication 4 MILLIGRAM(S): at 06:12

## 2019-10-12 RX ADMIN — Medication 650 MILLIGRAM(S): at 15:00

## 2019-10-12 RX ADMIN — Medication 1 MILLIGRAM(S): at 14:28

## 2019-10-12 RX ADMIN — Medication 1 TABLET(S): at 14:28

## 2019-10-12 RX ADMIN — PROPOFOL 5.44 MICROGRAM(S)/KG/MIN: 10 INJECTION, EMULSION INTRAVENOUS at 11:11

## 2019-10-12 RX ADMIN — Medication 650 MILLIGRAM(S): at 14:27

## 2019-10-12 RX ADMIN — Medication 4 MILLIGRAM(S): at 14:32

## 2019-10-12 RX ADMIN — CHLORHEXIDINE GLUCONATE 1 APPLICATION(S): 213 SOLUTION TOPICAL at 06:19

## 2019-10-12 NOTE — CHART NOTE - NSCHARTNOTEFT_GEN_A_CORE
49M with PMHx of DM, HTN, EtOh and opioid use presents with fall, hallucinations and agitations. Pt is confused and a questionable historian. Patient was brought in by his sister following being found on the ground near stairs. Per sister, he was hallucinating and talking to inanimate objects last night and overall acting strangely, which was even worse this morning. Pt now endorses feeling "sick, like I had the flu" for the past several days, complained of fatigue and myalgias as well as abdominal cramping. He admits to drinking slightly less than normal because of these symptoms. Additionally, he gets suboxone on the street however he hasn't been able to get any recently. States he normally takes about 24mg.  In ED, , /106, afebrile, labs wnl. Pt had lac repaired with staples.       Today the pt was called for rapid, RN as patient agitated, gave stat IV ativan dose. Patient became combative with staff, going into other patient rooms, posing a danger to himself, staff and other patients. Code grey called and security restrained patient with 4 point restraints. At this time CIWA greater than 30. Toxicology attending consulted. Patient given 5mg IM versed subsequent to which he became more calm however still occasionally fighting restraints. After 20 minutes received another 5mg IM versed. At this point patient well sedated. ICU consulted and discussed with fellow. Patient found to not be candidate for MICU monitoring.     AT around 10 Pm was called again by nurses as pt was agitated, pt needed to be intubated and be started on versed and propofol drip, cental line was inserted.  labs were drawn     Alcohol and opioid withdrawal: Pt was drinking up to 30 drinks a day (15 24oz beers) and was taking suboxone 24mg from the street  -f/u stat labs sent after intubation   - now pt intubated and sedated on versed and propofol  -eeg  -f/u neurology  -f/u addiction medicine 49M with PMHx of DM, HTN, EtOh and opioid use presents with fall, hallucinations and agitations. Pt is confused and a questionable historian. Patient was brought in by his sister following being found on the ground near stairs. Per sister, he was hallucinating and talking to inanimate objects last night and overall acting strangely, which was even worse this morning. Pt now endorses feeling "sick, like I had the flu" for the past several days, complained of fatigue and myalgias as well as abdominal cramping. He admits to drinking slightly less than normal because of these symptoms. Additionally, he gets suboxone on the street however he hasn't been able to get any recently. States he normally takes about 24mg.  In ED, , /106, afebrile, labs wnl. Pt had lac repaired with staples.       Today the pt was called for rapid, RN as patient agitated, gave stat IV ativan dose. Patient became combative with staff, going into other patient rooms, posing a danger to himself, staff and other patients. Code grey called and security restrained patient with 4 point restraints. At this time CIWA greater than 30. Toxicology attending consulted. Patient given 5mg IM versed subsequent to which he became more calm however still occasionally fighting restraints. After 20 minutes received another 5mg IM versed. At this point patient well sedated. ICU consulted and discussed with fellow. Patient found to not be candidate for MICU monitoring.     AT around 10 Pm was called again by nurses as pt was agitated, desating, was suspected to be in DT, pt needed to be intubated and be started on versed and propofol drip, cental line was inserted.  labs were drawn     Alcohol and opioid withdrawal: Pt was drinking up to 30 drinks a day (15 24oz beers) and was taking suboxone 24mg from the street  -f/u stat labs sent after intubation   - now pt intubated and sedated on versed and propofol  -eeg  -f/u neurology  -f/u addiction medicine

## 2019-10-12 NOTE — CONSULT NOTE ADULT - SUBJECTIVE AND OBJECTIVE BOX
Time:10-12-19 @ 02:36  [facility]  [facility]    Chief Complaint: Fall    History of Present Illness:  49m w a hx of DM, HTN, & polysubstance/EtOH abuse BIB EMS after fall and being found at bottom of stairs this morning by family. ED was told that patient was confused since last night with hallucination behaviors and tremors. Pt agitated on arrival to ED. +Head laceration repaired in ED. Pt seen by Trauma in ED. Pt required benzodiazepines for agitation. When patient was less confused, he endorsed feeling unwell and having decreased intake of EtOH. Pt also misuses Suboxone bought illicitly. Pt poor historian and unable to provide hx due to agitation/confusion at this time. Hx assisted by ED & Medicine Team.    I am unable to obtain a comprehensive history, review of systems, past medical history, and/or physical exam due to constraints imposed by the urgency of the patient's clinical condition and/or mental status.       Past Medical History:  DM (diabetes mellitus)  HTN (hypertension)  EtOH abuse  Opioid abuse      Home Medications:      Active Medications:  MEDICATIONS  (STANDING):  chlorhexidine 4% Liquid 1 Application(s) Topical <User Schedule>  cloNIDine 0.1 milliGRAM(s) Oral two times a day  dextrose 5% + sodium chloride 0.9%. 1000 milliLiter(s) (75 mL/Hr) IV Continuous <Continuous>  enoxaparin Injectable 40 milliGRAM(s) SubCutaneous daily  lactated ringers. 1000 milliLiter(s) (1000 mL/Hr) IV Continuous <Continuous>  LORazepam   Injectable 4 milliGRAM(s) IV Push every 4 hours  LORazepam   Injectable 3 milliGRAM(s) IV Push every 4 hours  LORazepam   Injectable   IV Push   midazolam Infusion. 0.05 mG/kG/Hr (4.535 mL/Hr) IV Continuous <Continuous>  pantoprazole    Tablet 40 milliGRAM(s) Oral before breakfast  propofol Infusion 10 MICROgram(s)/kG/Min (5.442 mL/Hr) IV Continuous <Continuous>  propofol Infusion 10 MICROgram(s)/kG/Min (5.442 mL/Hr) IV Continuous <Continuous>  propofol Injectable 600 milliGRAM(s) IV Push once  sodium chloride 0.9%. 1000 milliLiter(s) (75 mL/Hr) IV Continuous <Continuous>    MEDICATIONS  (PRN):      Social History:  Tobacco:   +  EtOH:   +  Illicit Substances:   +      Review of Systems:  Unable to assess due to: Agitation and confusion      Vital Signs Last 24 Hrs  T(C): 37.9 (11 Oct 2019 20:05), Max: 37.9 (11 Oct 2019 20:05)  T(F): 100.3 (11 Oct 2019 20:05), Max: 100.3 (11 Oct 2019 20:05)  HR: 91 (11 Oct 2019 22:43) (78 - 133)  BP: 129/81 (11 Oct 2019 21:56) (95/62 - 180/106)  BP(mean): --  RR: 18 (11 Oct 2019 22:31) (18 - 27)  SpO2: 100% (11 Oct 2019 22:43) (70% - 100%)    CAPILLARY BLOOD GLUCOSE  POCT Blood Glucose.: 133 mg/dL (11 Oct 2019 07:16)      Physical Exam:  General: Awake, mild dist, WDWN, no skull/facial tender, no step-offs, no raccoon/calabrese  Eyes: PERRL 5mm, EOMI, no icterus, lids and conjunctivae are normal  ENT: External inspection normal, pink/moist membranes, pharynx normal  CV: S1S2, regular rhythm, tachycardic, no murmur/gallops/rubs, no JVD, 2+ pulses b/l, no edema/cords/homans, warm/well-perfused  Respiratory: Normal respiratory rate/effort, no respiratory distress, lungs clear to auscultation b/l, no wheezing/rales/rhonchi, no retractions, no stridor  Abdomen: Soft abdomen, no tender/distended/guarding/rebound, no CVA tender  Musculoskeletal: FROM all 4 extremities, N/V intact, no dev tender/deform, normal tone.  Neck: FROM neck, supple, no meningismus, trachea midline, no JVD  Integumentary: Color normal for race, warm and diaphoretic, no rash. +Scalp laceration repaired  Neuro: Alert/interactive, confused, agitated, CN 2-12 grossly intact, moving all 4 ext spontaneously and in response to touch, no clonus/rigidity/hyper-reflexia, +tremors.  Psych: Unable to assess      Labs:                        14.3   5.25  )-----------( 73       ( 11 Oct 2019 07:04 )             40.8     10-11    136  |  94<L>  |  17  ----------------------------<  130<H>  4.1   |  26  |  0.9    Ca    9.3      11 Oct 2019 07:04    TPro  7.0  /  Alb  4.3  /  TBili  1.7<H>  /  DBili  x   /  AST  144<H>  /  ALT  84<H>  /  AlkPhos  117<H>  10-11    PT/INR - ( 11 Oct 2019 07:04 )   PT: 13.20 sec;   INR: 1.15 ratio    PTT - ( 11 Oct 2019 07:04 )  PTT:31.2 sec    Urinalysis Basic - ( 11 Oct 2019 15:21 )  Color: Yellow / Appearance: Clear / S.046 / pH: x  Gluc: x / Ketone: Negative  / Bili: Negative / Urobili: 3 mg/dL   Blood: x / Protein: Trace / Nitrite: Negative   Leuk Esterase: Negative / RBC: x / WBC x   Sq Epi: x / Non Sq Epi: x / Bacteria: x    ABG - ( 12 Oct 2019 01:11 )  pH, Arterial: 7.34  pH, Blood: x     /  pCO2: 47    /  pO2: 194   / HCO3: 25    / Base Excess: -0.8  /  SaO2: 100       CARDIAC MARKERS ( 11 Oct 2019 07:04 )  x     / x     / 199 U/L / x     / x        Ethanol:  79mg/dL    Blood Gas: 7.36-53 -> 7.34/47  Lactate: 3.0 -> 0.8

## 2019-10-12 NOTE — CONSULT NOTE ADULT - ASSESSMENT
49m w DM, HTN, & polysubstance/EtOH abuse presents w AMS/agitation, tremors, & diaphoresis concerning for EtOH withdrawal w delirium.    --Continue supportive care & monitoring.  --Trend CIWA scores. Please give patient IV benzodiazepines (lorazepam or diazepam) as needed for symptoms of EtOH withdrawal which can include tremors, tachycardia/hypertension, confusion/agitation, hallucinations, seizures, etc. Titrate benzodiazepines as needed for symptoms of withdrawal w goal of therapy RASS 0->-1 and normal vitals.   --Lorazepam can be given IV in escalating doses every 15-20 minutes until adequate sedation is achieved. For example: can give 2mg now and in 15-20min can give another 2mg if still not adequately sedated. After 15-20min if still not adequately sedated can give 4mg. After 15-20 min if still not adequately sedated can give another 4mg. Can continue with this pattern until titrated to adequate dosing and RASS 0->-1 achieved. After adequate dose is found, can use this dose further as baseline for treatement of elevated CIWA scores as symptom triggered therapy.  --If patient requires intubation, please give IV propofol or IV midazolam for sedation.  --Would give patient thiamine, and refer to detox on discharge planning.   --Please observe patient for development of hyperthermia due to psychomotor agitation. If core body temp >105, patient may need aggressive cooling measures including ice-water immersion. If patient showing any signs of psychomotor agitation, please give IV benzodiazepines as needed to control psychomotor agitation. Pt may require airway protection and neuromuscular relaxation w nondepolarizing neuromuscular blockade to control psychomotor agitation.   --Would give IV fluids at 1-1.5x maintenance rate for prevention of of rhabdomyolysis in patient w psychomotor agitation. Monitor urine outputs.   --No evidence of opioid withdrawal at this time. If patient having symptoms of opioid withdrawal, please give NSAID's for analgesia & antiemetics as needed.  --Electrolytes & renal function ok. Pt w mild elevated LFT's and hepatic steatosis on imaging; would continue to monitor and hold hepatotoxic agents. CT's ok.   --Will continue to follow. Please call with any further questions or changes in status.    Lauro    [phone] (pager)     ADDENDUM: After receiving benzodiazepines patient initially improved and sedated but became hypoxic while sedated and then had recurrence of agitation requiring further use of sedation. Pt intubated for airway protection and to continue treatment of EtOH withdrawal. Please wean to extubation as tolerated.

## 2019-10-12 NOTE — CONSULT NOTE ADULT - SUBJECTIVE AND OBJECTIVE BOX
Patient is a 49y old  Male who presents with a chief complaint of ETOH withdrawal, opioid withdrawal (12 Oct 2019 02:36)      HPI:  49M with PMHx of DM, HTN, EtOh and opioid use presents with fall, hallucinations and agitations. Pt is confused and a questionable historian. Patient was brought in by his sister following being found on the ground near stairs. Per sister, he was hallucinating and talking to inanimate objects last night and overall acting strangely, which was even worse this morning. Pt now endorses feeling "sick, like I had the flu" for the past several days, complained of fatigue and myalgias as well as abdominal cramping. He admits to drinking slightly less than normal because of these symptoms. Additionally, he gets suboxone on the street however he hasn't been able to get any recently. States he normally takes about 24mg.  In ED, , /106, afebrile, labs wnl. Pt had lac repaired with staples. (11 Oct 2019 15:27)      PAST MEDICAL & SURGICAL HISTORY:  DM (diabetes mellitus)  HTN (hypertension)  History of incision and drainage      SOCIAL HX:   Smoking                         ETOH         pOSTIVE                   Other OPIATES     FAMILY HISTORY:  FH: alcohol abuse: dad  :  No known cardiovacular family hisotry     ROS:  See HPI     Allergies    No Known Allergies    Intolerances          PHYSICAL EXAM    ICU Vital Signs Last 24 Hrs  T(C): 36.7 (12 Oct 2019 04:00), Max: 37.9 (11 Oct 2019 20:05)  T(F): 98 (12 Oct 2019 04:00), Max: 100.3 (11 Oct 2019 20:05)  HR: 88 (12 Oct 2019 06:00) (78 - 144)  BP: 115/88 (12 Oct 2019 06:00) (94/72 - 216/144)  BP(mean): 105 (12 Oct 2019 06:00) (74 - 176)  ABP: --  ABP(mean): --  RR: 16 (12 Oct 2019 06:00) (16 - 27)  SpO2: 100% (12 Oct 2019 06:00) (70% - 100%)      General: In NAD.  Sedated    HEENT:  + ET               Lymphatic system: No cervical LN   Lungs: Bilateral BS  Cardiovascular: Regular  Gastrointestinal: Soft, Positive BS  Musculoskeletal: No clubbing.  Moves all extremities.  Full range of motion   Skin: Warm.  Intact  Neurological: No motor deficit       10-11-19 @ 07:01  -  10-12-19 @ 07:00  --------------------------------------------------------  IN:  Total IN: 0 mL    OUT:    Voided: 800 mL  Total OUT: 800 mL    Total NET: -800 mL          LABS:                          14.3   5.25  )-----------( 73       ( 11 Oct 2019 07:04 )             40.8                                               10    135  |  101  |  16  ----------------------------<  180<H>  4.0   |  25  |  0.8    Ca    8.2<L>      12 Oct 2019 04:48  Mg     1.8     10-12    TPro  5.5<L>  /  Alb  3.3<L>  /  TBili  3.2<H>  /  DBili  x   /  AST  102<H>  /  ALT  62<H>  /  AlkPhos  81  1012      PT/INR - ( 11 Oct 2019 07:04 )   PT: 13.20 sec;   INR: 1.15 ratio         PTT - ( 11 Oct 2019 07:04 )  PTT:31.2 sec                                       Urinalysis Basic - ( 11 Oct 2019 15:21 )    Color: Yellow / Appearance: Clear / S.046 / pH: x  Gluc: x / Ketone: Negative  / Bili: Negative / Urobili: 3 mg/dL   Blood: x / Protein: Trace / Nitrite: Negative   Leuk Esterase: Negative / RBC: x / WBC x   Sq Epi: x / Non Sq Epi: x / Bacteria: x        CARDIAC MARKERS ( 11 Oct 2019 07:04 )  x     / x     / 199 U/L / x     / x                                                LIVER FUNCTIONS - ( 12 Oct 2019 04:48 )  Alb: 3.3 g/dL / Pro: 5.5 g/dL / ALK PHOS: 81 U/L / ALT: 62 U/L / AST: 102 U/L / GGT: x                                                                                               Mode: AC/ CMV (Assist Control/ Continuous Mandatory Ventilation)  RR (machine): 16  TV (machine): 450  FiO2: 40  PEEP: 5  ITime: 1  MAP: 6  PIP: 9                                      ABG - ( 12 Oct 2019 01:11 )  pH, Arterial: 7.34  pH, Blood: x     /  pCO2: 47    /  pO2: 194   / HCO3: 25    / Base Excess: -0.8  /  SaO2: 100                 X-Rays      ET OK.  Early RLL infitlrate                                                                                ECHO    MEDICATIONS  (STANDING):  chlorhexidine 4% Liquid 1 Application(s) Topical <User Schedule>  dextrose 5% + sodium chloride 0.9%. 1000 milliLiter(s) (75 mL/Hr) IV Continuous <Continuous>  enoxaparin Injectable 40 milliGRAM(s) SubCutaneous daily  fentaNYL   Infusion. 0.5 MICROgram(s)/kG/Hr (4.535 mL/Hr) IV Continuous <Continuous>  influenza   Vaccine 0.5 milliLiter(s) IntraMuscular once  LORazepam   Injectable 4 milliGRAM(s) IV Push every 4 hours  LORazepam   Injectable 3 milliGRAM(s) IV Push every 4 hours  LORazepam   Injectable   IV Push   pantoprazole    Tablet 40 milliGRAM(s) Oral before breakfast  propofol Infusion 10 MICROgram(s)/kG/Min (5.442 mL/Hr) IV Continuous <Continuous>  propofol Infusion 10 MICROgram(s)/kG/Min (5.442 mL/Hr) IV Continuous <Continuous>  propofol Injectable 600 milliGRAM(s) IV Push once    MEDICATIONS  (PRN):

## 2019-10-12 NOTE — CONSULT NOTE ADULT - ASSESSMENT
IMPRESSION:    Acute hypercapnic respiratory failure  ETOH withdrawal  HO ETOH and opiate abuse   Possible aspiration     PLAN:    CNS: Adequate sedation for today.  Change Versed to Fentanyl.     HEENT: Oral care    PULMONARY:  HOB @ 45 degrees.  Vent changes as follows: Wean O2.      CARDIOVASCULAR: i=O.  ECHO    GI: GI prophylaxis.  OG Feeding.  RUQ sono.  Hep panel     RENAL:  Follow up lytes.  Correct as needed    INFECTIOUS DISEASE: Follow up cultures.  Unasyn for now.  HIV    HEMATOLOGICAL:  DVT prophylaxis.    ENDOCRINE:  Follow up FS.  Insulin protocol if needed.    MUSCULOSKELETAL:  Bed cahir postion    DW sister at bed side

## 2019-10-13 LAB
ALBUMIN SERPL ELPH-MCNC: 3.2 G/DL — LOW (ref 3.5–5.2)
ALP SERPL-CCNC: 71 U/L — SIGNIFICANT CHANGE UP (ref 30–115)
ALT FLD-CCNC: 46 U/L — HIGH (ref 0–41)
ANION GAP SERPL CALC-SCNC: 10 MMOL/L — SIGNIFICANT CHANGE UP (ref 7–14)
AST SERPL-CCNC: 73 U/L — HIGH (ref 0–41)
BASOPHILS # BLD AUTO: 0.01 K/UL — SIGNIFICANT CHANGE UP (ref 0–0.2)
BASOPHILS NFR BLD AUTO: 0.2 % — SIGNIFICANT CHANGE UP (ref 0–1)
BILIRUB SERPL-MCNC: 2.9 MG/DL — HIGH (ref 0.2–1.2)
BUN SERPL-MCNC: 12 MG/DL — SIGNIFICANT CHANGE UP (ref 10–20)
CALCIUM SERPL-MCNC: 8.4 MG/DL — LOW (ref 8.5–10.1)
CHLORIDE SERPL-SCNC: 103 MMOL/L — SIGNIFICANT CHANGE UP (ref 98–110)
CO2 SERPL-SCNC: 26 MMOL/L — SIGNIFICANT CHANGE UP (ref 17–32)
CREAT SERPL-MCNC: 0.7 MG/DL — SIGNIFICANT CHANGE UP (ref 0.7–1.5)
CULTURE RESULTS: SIGNIFICANT CHANGE UP
EOSINOPHIL # BLD AUTO: 0.06 K/UL — SIGNIFICANT CHANGE UP (ref 0–0.7)
EOSINOPHIL NFR BLD AUTO: 1.2 % — SIGNIFICANT CHANGE UP (ref 0–8)
GLUCOSE BLDC GLUCOMTR-MCNC: 120 MG/DL — HIGH (ref 70–99)
GLUCOSE BLDC GLUCOMTR-MCNC: 152 MG/DL — HIGH (ref 70–99)
GLUCOSE BLDC GLUCOMTR-MCNC: 163 MG/DL — HIGH (ref 70–99)
GLUCOSE BLDC GLUCOMTR-MCNC: 169 MG/DL — HIGH (ref 70–99)
GLUCOSE SERPL-MCNC: 146 MG/DL — HIGH (ref 70–99)
HAV IGG SER QL IA: REACTIVE
HAV IGM SER-ACNC: SIGNIFICANT CHANGE UP
HAV IGM SER-ACNC: SIGNIFICANT CHANGE UP
HBV CORE AB SER-ACNC: SIGNIFICANT CHANGE UP
HBV CORE IGM SER-ACNC: SIGNIFICANT CHANGE UP
HBV CORE IGM SER-ACNC: SIGNIFICANT CHANGE UP
HBV SURFACE AB SER-ACNC: SIGNIFICANT CHANGE UP
HBV SURFACE AG SER-ACNC: SIGNIFICANT CHANGE UP
HBV SURFACE AG SER-ACNC: SIGNIFICANT CHANGE UP
HCT VFR BLD CALC: 27.9 % — LOW (ref 42–52)
HCV AB S/CO SERPL IA: 0.16 S/CO — SIGNIFICANT CHANGE UP (ref 0–0.99)
HCV AB S/CO SERPL IA: 0.16 S/CO — SIGNIFICANT CHANGE UP (ref 0–0.99)
HCV AB SERPL-IMP: SIGNIFICANT CHANGE UP
HCV AB SERPL-IMP: SIGNIFICANT CHANGE UP
HGB BLD-MCNC: 9.1 G/DL — LOW (ref 14–18)
HIV 1+2 AB+HIV1 P24 AG SERPL QL IA: SIGNIFICANT CHANGE UP
IMM GRANULOCYTES NFR BLD AUTO: 0.6 % — HIGH (ref 0.1–0.3)
LYMPHOCYTES # BLD AUTO: 1.55 K/UL — SIGNIFICANT CHANGE UP (ref 1.2–3.4)
LYMPHOCYTES # BLD AUTO: 30.6 % — SIGNIFICANT CHANGE UP (ref 20.5–51.1)
MAGNESIUM SERPL-MCNC: 2 MG/DL — SIGNIFICANT CHANGE UP (ref 1.8–2.4)
MCHC RBC-ENTMCNC: 31.9 PG — HIGH (ref 27–31)
MCHC RBC-ENTMCNC: 32.6 G/DL — SIGNIFICANT CHANGE UP (ref 32–37)
MCV RBC AUTO: 97.9 FL — HIGH (ref 80–94)
MONOCYTES # BLD AUTO: 0.35 K/UL — SIGNIFICANT CHANGE UP (ref 0.1–0.6)
MONOCYTES NFR BLD AUTO: 6.9 % — SIGNIFICANT CHANGE UP (ref 1.7–9.3)
NEUTROPHILS # BLD AUTO: 3.06 K/UL — SIGNIFICANT CHANGE UP (ref 1.4–6.5)
NEUTROPHILS NFR BLD AUTO: 60.5 % — SIGNIFICANT CHANGE UP (ref 42.2–75.2)
NRBC # BLD: 0 /100 WBCS — SIGNIFICANT CHANGE UP (ref 0–0)
PLATELET # BLD AUTO: 37 K/UL — LOW (ref 130–400)
POTASSIUM SERPL-MCNC: 3.8 MMOL/L — SIGNIFICANT CHANGE UP (ref 3.5–5)
POTASSIUM SERPL-SCNC: 3.8 MMOL/L — SIGNIFICANT CHANGE UP (ref 3.5–5)
PROT SERPL-MCNC: 5.1 G/DL — LOW (ref 6–8)
RBC # BLD: 2.85 M/UL — LOW (ref 4.7–6.1)
RBC # FLD: 12.6 % — SIGNIFICANT CHANGE UP (ref 11.5–14.5)
SODIUM SERPL-SCNC: 139 MMOL/L — SIGNIFICANT CHANGE UP (ref 135–146)
SPECIMEN SOURCE: SIGNIFICANT CHANGE UP
T PALLIDUM AB TITR SER: NEGATIVE — SIGNIFICANT CHANGE UP
WBC # BLD: 5.06 K/UL — SIGNIFICANT CHANGE UP (ref 4.8–10.8)
WBC # FLD AUTO: 5.06 K/UL — SIGNIFICANT CHANGE UP (ref 4.8–10.8)

## 2019-10-13 PROCEDURE — 76705 ECHO EXAM OF ABDOMEN: CPT | Mod: 26

## 2019-10-13 PROCEDURE — 95822 EEG COMA OR SLEEP ONLY: CPT | Mod: 26

## 2019-10-13 PROCEDURE — 71045 X-RAY EXAM CHEST 1 VIEW: CPT | Mod: 26

## 2019-10-13 RX ORDER — ACETAMINOPHEN 500 MG
650 TABLET ORAL EVERY 6 HOURS
Refills: 0 | Status: DISCONTINUED | OUTPATIENT
Start: 2019-10-13 | End: 2019-10-17

## 2019-10-13 RX ORDER — PANTOPRAZOLE SODIUM 20 MG/1
40 TABLET, DELAYED RELEASE ORAL ONCE
Refills: 0 | Status: COMPLETED | OUTPATIENT
Start: 2019-10-13 | End: 2019-10-13

## 2019-10-13 RX ADMIN — Medication 1 TABLET(S): at 11:33

## 2019-10-13 RX ADMIN — AMPICILLIN SODIUM AND SULBACTAM SODIUM 200 GRAM(S): 250; 125 INJECTION, POWDER, FOR SUSPENSION INTRAMUSCULAR; INTRAVENOUS at 05:31

## 2019-10-13 RX ADMIN — PANTOPRAZOLE SODIUM 40 MILLIGRAM(S): 20 TABLET, DELAYED RELEASE ORAL at 09:21

## 2019-10-13 RX ADMIN — AMPICILLIN SODIUM AND SULBACTAM SODIUM 200 GRAM(S): 250; 125 INJECTION, POWDER, FOR SUSPENSION INTRAMUSCULAR; INTRAVENOUS at 01:02

## 2019-10-13 RX ADMIN — Medication 3 MILLIGRAM(S): at 00:05

## 2019-10-13 RX ADMIN — Medication 3 MILLIGRAM(S): at 09:22

## 2019-10-13 RX ADMIN — PROPOFOL 5.44 MICROGRAM(S)/KG/MIN: 10 INJECTION, EMULSION INTRAVENOUS at 05:35

## 2019-10-13 RX ADMIN — Medication 102.5 MILLIGRAM(S): at 13:32

## 2019-10-13 RX ADMIN — Medication 650 MILLIGRAM(S): at 16:30

## 2019-10-13 RX ADMIN — AMPICILLIN SODIUM AND SULBACTAM SODIUM 200 GRAM(S): 250; 125 INJECTION, POWDER, FOR SUSPENSION INTRAMUSCULAR; INTRAVENOUS at 18:24

## 2019-10-13 RX ADMIN — AMPICILLIN SODIUM AND SULBACTAM SODIUM 200 GRAM(S): 250; 125 INJECTION, POWDER, FOR SUSPENSION INTRAMUSCULAR; INTRAVENOUS at 13:31

## 2019-10-13 RX ADMIN — Medication 650 MILLIGRAM(S): at 05:51

## 2019-10-13 RX ADMIN — FENTANYL CITRATE 4.54 MICROGRAM(S)/KG/HR: 50 INJECTION INTRAVENOUS at 05:55

## 2019-10-13 RX ADMIN — CHLORHEXIDINE GLUCONATE 1 APPLICATION(S): 213 SOLUTION TOPICAL at 05:31

## 2019-10-13 RX ADMIN — PROPOFOL 5.44 MICROGRAM(S)/KG/MIN: 10 INJECTION, EMULSION INTRAVENOUS at 00:05

## 2019-10-13 RX ADMIN — ENOXAPARIN SODIUM 40 MILLIGRAM(S): 100 INJECTION SUBCUTANEOUS at 11:33

## 2019-10-13 RX ADMIN — Medication 1 MILLIGRAM(S): at 11:33

## 2019-10-13 RX ADMIN — Medication 650 MILLIGRAM(S): at 15:30

## 2019-10-13 RX ADMIN — Medication 3 MILLIGRAM(S): at 18:24

## 2019-10-13 RX ADMIN — Medication 3 MILLIGRAM(S): at 15:40

## 2019-10-13 RX ADMIN — Medication 3 MILLIGRAM(S): at 03:51

## 2019-10-13 RX ADMIN — Medication 2 MILLIGRAM(S): at 21:58

## 2019-10-13 NOTE — PROGRESS NOTE ADULT - SUBJECTIVE AND OBJECTIVE BOX
Patient is a 49y old  Male who presents with a chief complaint of ETOH withdrawal, opioid withdrawal (12 Oct 2019 07:37)        Over Night Events:No events overnight, still intubated        ROS:  See HPI    PHYSICAL EXAM    ICU Vital Signs Last 24 Hrs  T(C): 38.3 (13 Oct 2019 05:00), Max: 38.9 (12 Oct 2019 14:00)  T(F): 100.9 (13 Oct 2019 05:00), Max: 102 (12 Oct 2019 14:00)  HR: 84 (13 Oct 2019 06:00) (80 - 107)  BP: 97/53 (13 Oct 2019 06:00) (83/52 - 104/65)  BP(mean): 69 (13 Oct 2019 06:00) (61 - 78)  ABP: --  ABP(mean): --  RR: 18 (13 Oct 2019 06:00) (17 - 26)  SpO2: 98% (13 Oct 2019 06:00) (96% - 100%)      General:  HEENT: CAROLINE  ,intubated           Lungs: Bilateral BS  Cardiovascular: Regular   Gastrointestinal: Soft, Positive BS  Extremities: No clubbing.  Moves extremities  Skin: Warm, intact  Neurological: Non focal       10-12-19 @ 07:01  -  10-13-19 @ 07:00  --------------------------------------------------------  IN:    dextrose 5% + sodium chloride 0.9%: 300 mL    fentaNYL Infusion.: 315.6 mL    Glucerna: 370 mL    IV PiggyBack: 200 mL    propofol Infusion: 407.6 mL    propofol Infusion: 48.9 mL  Total IN: 1642.1 mL    OUT:    Indwelling Catheter - Urethral: 1750 mL  Total OUT: 1750 mL    Total NET: -107.9 mL          LABS:                            9.1    5.06  )-----------( 37       ( 13 Oct 2019 06:00 )             27.9                                               10    139  |  103  |  12  ----------------------------<  146<H>  3.8   |  26  |  0.7    Ca    8.4<L>      13 Oct 2019 06:00  Mg     2.0     10    TPro  5.1<L>  /  Alb  3.2<L>  /  TBili  2.9<H>  /  DBili  x   /  AST  73<H>  /  ALT  46<H>  /  AlkPhos  71  10-13                                             Urinalysis Basic - ( 11 Oct 2019 15:21 )    Color: Yellow / Appearance: Clear / S.046 / pH: x  Gluc: x / Ketone: Negative  / Bili: Negative / Urobili: 3 mg/dL   Blood: x / Protein: Trace / Nitrite: Negative   Leuk Esterase: Negative / RBC: x / WBC x   Sq Epi: x / Non Sq Epi: x / Bacteria: x                                                  LIVER FUNCTIONS - ( 13 Oct 2019 06:00 )  Alb: 3.2 g/dL / Pro: 5.1 g/dL / ALK PHOS: 71 U/L / ALT: 46 U/L / AST: 73 U/L / GGT: x                                                                                               Mode: AC/ CMV (Assist Control/ Continuous Mandatory Ventilation)  RR (machine): 16  TV (machine): 450  FiO2: 30  PEEP: 5  ITime: 1  MAP: 4  PIP: 11                                      ABG - ( 13 Oct 2019 06:06 )  pH, Arterial: 7.43  pH, Blood: x     /  pCO2: 42    /  pO2: 77    / HCO3: 28    / Base Excess: 3.1   /  SaO2: 96                  MEDICATIONS  (STANDING):  ampicillin/sulbactam  IVPB      ampicillin/sulbactam  IVPB 3 Gram(s) IV Intermittent every 6 hours  chlorhexidine 4% Liquid 1 Application(s) Topical <User Schedule>  enoxaparin Injectable 40 milliGRAM(s) SubCutaneous daily  fentaNYL   Infusion. 0.5 MICROgram(s)/kG/Hr (4.535 mL/Hr) IV Continuous <Continuous>  folic acid 1 milliGRAM(s) Oral daily  influenza   Vaccine 0.5 milliLiter(s) IntraMuscular once  LORazepam   Injectable 3 milliGRAM(s) IV Push every 4 hours  LORazepam   Injectable 2 milliGRAM(s) IV Push every 4 hours  LORazepam   Injectable   IV Push   multivitamin 1 Tablet(s) Oral daily  pantoprazole  Injectable 40 milliGRAM(s) IV Push once  propofol Infusion 10 MICROgram(s)/kG/Min (5.442 mL/Hr) IV Continuous <Continuous>  propofol Injectable 600 milliGRAM(s) IV Push once  thiamine IVPB 250 milliGRAM(s) IV Intermittent daily    MEDICATIONS  (PRN):  acetaminophen   Tablet .. 650 milliGRAM(s) Oral every 6 hours PRN Temp greater or equal to 38C (100.4F)      Xrays:                                                                                     ECHO Patient is a 49y old  Male who presents with a chief complaint of ETOH withdrawal, opioid withdrawal (12 Oct 2019 07:37)        Over Night Events: No events overnight, still intubated  on MV.  Off pressors         ROS:  See HPI    PHYSICAL EXAM    ICU Vital Signs Last 24 Hrs  T(C): 38.3 (13 Oct 2019 05:00), Max: 38.9 (12 Oct 2019 14:00)  T(F): 100.9 (13 Oct 2019 05:00), Max: 102 (12 Oct 2019 14:00)  HR: 84 (13 Oct 2019 06:00) (80 - 107)  BP: 97/53 (13 Oct 2019 06:00) (83/52 - 104/65)  BP(mean): 69 (13 Oct 2019 06:00) (61 - 78)  ABP: --  ABP(mean): --  RR: 18 (13 Oct 2019 06:00) (17 - 26)  SpO2: 98% (13 Oct 2019 06:00) (96% - 100%)      General: in NAD.  Sedated   HEENT:  + ET            Lungs: Bilateral BS  Cardiovascular: Regular   Gastrointestinal: Soft, Positive BS  Extremities: No clubbing.  Moves extremities  Skin: Warm, intact  Neurological: Non focal       10-12-19 @ 07:01  -  10-13-19 @ 07:00  --------------------------------------------------------  IN:    dextrose 5% + sodium chloride 0.9%: 300 mL    fentaNYL Infusion.: 315.6 mL    Glucerna: 370 mL    IV PiggyBack: 200 mL    propofol Infusion: 407.6 mL    propofol Infusion: 48.9 mL  Total IN: 1642.1 mL    OUT:    Indwelling Catheter - Urethral: 1750 mL  Total OUT: 1750 mL    Total NET: -107.9 mL          LABS:                            9.1    5.06  )-----------( 37       ( 13 Oct 2019 06:00 )             27.9                                               10-13    139  |  103  |  12  ----------------------------<  146<H>  3.8   |  26  |  0.7    Ca    8.4<L>      13 Oct 2019 06:00  Mg     2.0     10    TPro  5.1<L>  /  Alb  3.2<L>  /  TBili  2.9<H>  /  DBili  x   /  AST  73<H>  /  ALT  46<H>  /  AlkPhos  71  10-13                                             Urinalysis Basic - ( 11 Oct 2019 15:21 )    Color: Yellow / Appearance: Clear / S.046 / pH: x  Gluc: x / Ketone: Negative  / Bili: Negative / Urobili: 3 mg/dL   Blood: x / Protein: Trace / Nitrite: Negative   Leuk Esterase: Negative / RBC: x / WBC x   Sq Epi: x / Non Sq Epi: x / Bacteria: x                                                  LIVER FUNCTIONS - ( 13 Oct 2019 06:00 )  Alb: 3.2 g/dL / Pro: 5.1 g/dL / ALK PHOS: 71 U/L / ALT: 46 U/L / AST: 73 U/L / GGT: x                                                                                               Mode: AC/ CMV (Assist Control/ Continuous Mandatory Ventilation)  RR (machine): 16  TV (machine): 450  FiO2: 30  PEEP: 5  ITime: 1  MAP: 4  PIP: 11                                      ABG - ( 13 Oct 2019 06:06 )  pH, Arterial: 7.43  pH, Blood: x     /  pCO2: 42    /  pO2: 77    / HCO3: 28    / Base Excess: 3.1   /  SaO2: 96                  MEDICATIONS  (STANDING):  ampicillin/sulbactam  IVPB      ampicillin/sulbactam  IVPB 3 Gram(s) IV Intermittent every 6 hours  chlorhexidine 4% Liquid 1 Application(s) Topical <User Schedule>  enoxaparin Injectable 40 milliGRAM(s) SubCutaneous daily  fentaNYL   Infusion. 0.5 MICROgram(s)/kG/Hr (4.535 mL/Hr) IV Continuous <Continuous>  folic acid 1 milliGRAM(s) Oral daily  influenza   Vaccine 0.5 milliLiter(s) IntraMuscular once  LORazepam   Injectable 3 milliGRAM(s) IV Push every 4 hours  LORazepam   Injectable 2 milliGRAM(s) IV Push every 4 hours  LORazepam   Injectable   IV Push   multivitamin 1 Tablet(s) Oral daily  pantoprazole  Injectable 40 milliGRAM(s) IV Push once  propofol Infusion 10 MICROgram(s)/kG/Min (5.442 mL/Hr) IV Continuous <Continuous>  propofol Injectable 600 milliGRAM(s) IV Push once  thiamine IVPB 250 milliGRAM(s) IV Intermittent daily    MEDICATIONS  (PRN):  acetaminophen   Tablet .. 650 milliGRAM(s) Oral every 6 hours PRN Temp greater or equal to 38C (100.4F)      Xrays:       ET TLC OG OK.  No infiltrates                                                                               ECHO

## 2019-10-13 NOTE — PROGRESS NOTE ADULT - ASSESSMENT
IMPRESSION:    Acute hypercapnic respiratory failure  ETOH withdrawal  HO ETOH and opiate abuse   Possible aspiration - R/o Aspiration PNA    PLAN:    CNS: Adequate sedation for today.  c/w propofol and fentanyl     HEENT: Oral care     PULMONARY:  HOB @ 45 degrees.  Vent changes as follows: Wean O2 as tolerated. SBT today    CARDIOVASCULAR: i=O.  ECHO     GI: GI prophylaxis.  OG Feeding.  RUQ sono.      RENAL:  Follow up lytes.  Correct as needed    INFECTIOUS DISEASE: Follow up cultures.  Unasyn for now    HEMATOLOGICAL:  DVT prophylaxis.    ENDOCRINE:  Follow up FS.  Insulin protocol if needed.    MUSCULOSKELETAL:  Bed chair  postion    MICU monitoring IMPRESSION:    Acute hypercapnic respiratory failure improved   ETOH withdrawal  HO ETOH and opiate abuse   Possible aspiration -     PLAN:    CNS:  SAT.     HEENT: Oral care     PULMONARY:  HOB @ 45 degrees.  Vent changes as follows: Wean O2 as tolerated. SBT today if awake     CARDIOVASCULAR: i=O. FU ECHO     GI: GI prophylaxis.  OG Feeding.  RUQ sono.      RENAL:  Follow up lytes.  Correct as needed    INFECTIOUS DISEASE: Follow up cultures.  Unasyn for now    HEMATOLOGICAL:  DVT prophylaxis.    ENDOCRINE:  Follow up FS.  Insulin protocol if needed.    MUSCULOSKELETAL:  Bed chair  position    MICU monitoring for now

## 2019-10-14 LAB
ALBUMIN SERPL ELPH-MCNC: 3.2 G/DL — LOW (ref 3.5–5.2)
ALP SERPL-CCNC: 66 U/L — SIGNIFICANT CHANGE UP (ref 30–115)
ALT FLD-CCNC: 42 U/L — HIGH (ref 0–41)
AMMONIA BLD-MCNC: 64 UMOL/L — HIGH (ref 11–55)
ANION GAP SERPL CALC-SCNC: 10 MMOL/L — SIGNIFICANT CHANGE UP (ref 7–14)
ANION GAP SERPL CALC-SCNC: 8 MMOL/L — SIGNIFICANT CHANGE UP (ref 7–14)
APPEARANCE UR: CLEAR — SIGNIFICANT CHANGE UP
AST SERPL-CCNC: 68 U/L — HIGH (ref 0–41)
BACTERIA # UR AUTO: NEGATIVE — SIGNIFICANT CHANGE UP
BILIRUB SERPL-MCNC: 2.5 MG/DL — HIGH (ref 0.2–1.2)
BILIRUB UR-MCNC: ABNORMAL
BUN SERPL-MCNC: 11 MG/DL — SIGNIFICANT CHANGE UP (ref 10–20)
BUN SERPL-MCNC: 12 MG/DL — SIGNIFICANT CHANGE UP (ref 10–20)
CALCIUM SERPL-MCNC: 8.2 MG/DL — LOW (ref 8.5–10.1)
CALCIUM SERPL-MCNC: 8.2 MG/DL — LOW (ref 8.5–10.1)
CHLORIDE SERPL-SCNC: 104 MMOL/L — SIGNIFICANT CHANGE UP (ref 98–110)
CHLORIDE SERPL-SCNC: 106 MMOL/L — SIGNIFICANT CHANGE UP (ref 98–110)
CO2 SERPL-SCNC: 26 MMOL/L — SIGNIFICANT CHANGE UP (ref 17–32)
CO2 SERPL-SCNC: 28 MMOL/L — SIGNIFICANT CHANGE UP (ref 17–32)
COLOR SPEC: ABNORMAL
CREAT SERPL-MCNC: 0.6 MG/DL — LOW (ref 0.7–1.5)
CREAT SERPL-MCNC: 0.6 MG/DL — LOW (ref 0.7–1.5)
DIFF PNL FLD: NEGATIVE — SIGNIFICANT CHANGE UP
EPI CELLS # UR: 0 /HPF — SIGNIFICANT CHANGE UP (ref 0–5)
GLUCOSE BLDC GLUCOMTR-MCNC: 106 MG/DL — HIGH (ref 70–99)
GLUCOSE BLDC GLUCOMTR-MCNC: 116 MG/DL — HIGH (ref 70–99)
GLUCOSE BLDC GLUCOMTR-MCNC: 120 MG/DL — HIGH (ref 70–99)
GLUCOSE BLDC GLUCOMTR-MCNC: 136 MG/DL — HIGH (ref 70–99)
GLUCOSE BLDC GLUCOMTR-MCNC: 142 MG/DL — HIGH (ref 70–99)
GLUCOSE BLDC GLUCOMTR-MCNC: 146 MG/DL — HIGH (ref 70–99)
GLUCOSE BLDC GLUCOMTR-MCNC: 204 MG/DL — HIGH (ref 70–99)
GLUCOSE SERPL-MCNC: 113 MG/DL — HIGH (ref 70–99)
GLUCOSE SERPL-MCNC: 158 MG/DL — HIGH (ref 70–99)
GLUCOSE UR QL: NEGATIVE — SIGNIFICANT CHANGE UP
HCT VFR BLD CALC: 25.6 % — LOW (ref 42–52)
HGB BLD-MCNC: 8.5 G/DL — LOW (ref 14–18)
HYALINE CASTS # UR AUTO: 2 /LPF — SIGNIFICANT CHANGE UP (ref 0–7)
KETONES UR-MCNC: NEGATIVE — SIGNIFICANT CHANGE UP
LEUKOCYTE ESTERASE UR-ACNC: NEGATIVE — SIGNIFICANT CHANGE UP
MAGNESIUM SERPL-MCNC: 1.9 MG/DL — SIGNIFICANT CHANGE UP (ref 1.8–2.4)
MCHC RBC-ENTMCNC: 32.9 PG — HIGH (ref 27–31)
MCHC RBC-ENTMCNC: 33.2 G/DL — SIGNIFICANT CHANGE UP (ref 32–37)
MCV RBC AUTO: 99.2 FL — HIGH (ref 80–94)
NITRITE UR-MCNC: NEGATIVE — SIGNIFICANT CHANGE UP
NRBC # BLD: 0 /100 WBCS — SIGNIFICANT CHANGE UP (ref 0–0)
NT-PROBNP SERPL-SCNC: 203 PG/ML — SIGNIFICANT CHANGE UP (ref 0–300)
PH UR: 6 — SIGNIFICANT CHANGE UP (ref 5–8)
PHOSPHATE SERPL-MCNC: 2.3 MG/DL — SIGNIFICANT CHANGE UP (ref 2.1–4.9)
PLATELET # BLD AUTO: 48 K/UL — LOW (ref 130–400)
POTASSIUM SERPL-MCNC: 3.8 MMOL/L — SIGNIFICANT CHANGE UP (ref 3.5–5)
POTASSIUM SERPL-MCNC: 3.9 MMOL/L — SIGNIFICANT CHANGE UP (ref 3.5–5)
POTASSIUM SERPL-SCNC: 3.8 MMOL/L — SIGNIFICANT CHANGE UP (ref 3.5–5)
POTASSIUM SERPL-SCNC: 3.9 MMOL/L — SIGNIFICANT CHANGE UP (ref 3.5–5)
PROT SERPL-MCNC: 5.4 G/DL — LOW (ref 6–8)
PROT UR-MCNC: SIGNIFICANT CHANGE UP
RBC # BLD: 2.58 M/UL — LOW (ref 4.7–6.1)
RBC # FLD: 12.7 % — SIGNIFICANT CHANGE UP (ref 11.5–14.5)
RBC CASTS # UR COMP ASSIST: 2 /HPF — SIGNIFICANT CHANGE UP (ref 0–4)
SODIUM SERPL-SCNC: 140 MMOL/L — SIGNIFICANT CHANGE UP (ref 135–146)
SODIUM SERPL-SCNC: 142 MMOL/L — SIGNIFICANT CHANGE UP (ref 135–146)
SP GR SPEC: 1.03 — HIGH (ref 1.01–1.02)
UROBILINOGEN FLD QL: ABNORMAL
WBC # BLD: 3.81 K/UL — LOW (ref 4.8–10.8)
WBC # FLD AUTO: 3.81 K/UL — LOW (ref 4.8–10.8)
WBC UR QL: 4 /HPF — SIGNIFICANT CHANGE UP (ref 0–5)

## 2019-10-14 PROCEDURE — 99233 SBSQ HOSP IP/OBS HIGH 50: CPT

## 2019-10-14 PROCEDURE — 71045 X-RAY EXAM CHEST 1 VIEW: CPT | Mod: 26,59

## 2019-10-14 PROCEDURE — 71046 X-RAY EXAM CHEST 2 VIEWS: CPT | Mod: 26

## 2019-10-14 RX ADMIN — Medication 102.5 MILLIGRAM(S): at 13:54

## 2019-10-14 RX ADMIN — Medication 1 TABLET(S): at 12:05

## 2019-10-14 RX ADMIN — AMPICILLIN SODIUM AND SULBACTAM SODIUM 200 GRAM(S): 250; 125 INJECTION, POWDER, FOR SUSPENSION INTRAMUSCULAR; INTRAVENOUS at 06:12

## 2019-10-14 RX ADMIN — Medication 2 MILLIGRAM(S): at 10:29

## 2019-10-14 RX ADMIN — CHLORHEXIDINE GLUCONATE 1 APPLICATION(S): 213 SOLUTION TOPICAL at 06:13

## 2019-10-14 RX ADMIN — AMPICILLIN SODIUM AND SULBACTAM SODIUM 200 GRAM(S): 250; 125 INJECTION, POWDER, FOR SUSPENSION INTRAMUSCULAR; INTRAVENOUS at 17:24

## 2019-10-14 RX ADMIN — Medication 2 MILLIGRAM(S): at 18:39

## 2019-10-14 RX ADMIN — AMPICILLIN SODIUM AND SULBACTAM SODIUM 200 GRAM(S): 250; 125 INJECTION, POWDER, FOR SUSPENSION INTRAMUSCULAR; INTRAVENOUS at 12:05

## 2019-10-14 RX ADMIN — AMPICILLIN SODIUM AND SULBACTAM SODIUM 200 GRAM(S): 250; 125 INJECTION, POWDER, FOR SUSPENSION INTRAMUSCULAR; INTRAVENOUS at 00:04

## 2019-10-14 RX ADMIN — Medication 1 MILLIGRAM(S): at 12:05

## 2019-10-14 RX ADMIN — Medication 2 MILLIGRAM(S): at 15:13

## 2019-10-14 RX ADMIN — Medication 2 MILLIGRAM(S): at 06:12

## 2019-10-14 RX ADMIN — AMPICILLIN SODIUM AND SULBACTAM SODIUM 200 GRAM(S): 250; 125 INJECTION, POWDER, FOR SUSPENSION INTRAMUSCULAR; INTRAVENOUS at 23:10

## 2019-10-14 RX ADMIN — Medication 1 MILLIGRAM(S): at 22:00

## 2019-10-14 RX ADMIN — Medication 2 MILLIGRAM(S): at 02:53

## 2019-10-14 NOTE — CHART NOTE - NSCHARTNOTEFT_GEN_A_CORE
49M with PMHx of DM, HTN, EtOh and opioid use presents with fall, hallucinations and agitations. Pt is confused and a questionable historian. Patient was brought in by his sister following being found on the ground near stairs. Per sister, he was hallucinating and talking to inanimate objects last night and overall acting strangely, which was even worse this morning. Pt now endorses feeling "sick, like I had the flu" for the past several days, complained of fatigue and myalgias as well as abdominal cramping. He admits to drinking slightly less than normal because of these symptoms. Additionally, he gets suboxone on the street however he hasn't been able to get any recently. States he normally takes about 24mg.  In ED, , /106, afebrile, labs wnl. Pt had lac repaired with staples. (11 Oct 2019 15:27)    Pt was intubated to protect his airway. Pt became hypotensive post-intubation. Central line was placed and pt was given pressor. Pt was also found to have aspiration pneumonia. Abx was started. Pt eventually improved and off pressor. Pt extubated and ready to be downgraded to floor.

## 2019-10-14 NOTE — SWALLOW BEDSIDE ASSESSMENT ADULT - SLP GENERAL OBSERVATIONS
pt asleep however arousable. + confusion noted. sister present at bedside. pt ox3 (self, year, and place) however required cues

## 2019-10-14 NOTE — SBIRT NOTE ADULT - NSSBIRTALCNAMECONTACT_GEN_A_CORE
Telephone Encounter by Bart Diaz MD at 03/13/17 11:30 AM     Author:  Bart Diaz MD Service:  (none) Author Type:  Physician     Filed:  03/13/17 11:30 AM Encounter Date:  3/13/2017 Status:  Signed     :  Bart Diaz MD (Physician)            Agree[MD1.1M]        Revision History        User Key Date/Time User Provider Type Action    > MD1.1 03/13/17 11:30 AM Bart Diaz MD Physician Sign    M - Manual             unknown

## 2019-10-14 NOTE — SWALLOW BEDSIDE ASSESSMENT ADULT - SWALLOW EVAL: PROGNOSIS
pt is at risk for aspiration only 2/2 AMS and therefore must be 1:1 feed to ensure he stays awake while eating

## 2019-10-14 NOTE — CHART NOTE - NSCHARTNOTEFT_GEN_A_CORE
Trauma tertiary Survey    Patient seen and examined. Currently extubated in ED. Complains of posterior head pain.    PHYSICAL EXAM:  Craniofacial: Posterior scalp laceration, closed with staples.   Eyes: Pupil Size equal B/L and RTL. EOMI  Oropharynx: Atraumatic  Neck: Non-tender, No deformity, Trachea midline.  Chest: Equal breath sounds, non-tender, No deformity or crepitus  Heart: RSR, No murmurs, no rubs  Abdomen: Atraumatic, Non-tender, non-distended  Pelvis: Stable, non-tender, no deformity  Back: Spine non-tender, Atraumatic  Extremities: Atraumatic, no deformities, normal pulses, moving all extremities   Neurologic: CN intact, Motor intact throughout. Sensation intact/normal throughout.      CAGE SUBSTANCE ABUSE SCREENING TOOL:  1.	Have you ever felt you should cut down on your drinking?   [  x ] YES = 1      [  ] NO = 0  2.	Have people annoyed you by criticizing your drinking?    [  x ] YES = 1      [  ] NO = 0  3.	Have you ever felt bad or guilty about your drinking?   [  x  ] YES = 1      [  ] NO = 0  4.	Have you ever had a drink first thing in the morning to steady your nerves or to get rid of a hangover (eye-opener)?    [  x  ] YES = 1      [  ] NO = 0    Total =     [  x ] Score is two or greater which is considered clinically significant, social work consult will be placed.   [  ] Score is not two or greater which is not considered clinically significant, social work consult not warranted at this time.    Due to positive CAGE questionaire patient screened positive for substance abuse. Screening, Brief Intervention and Referral to Treatment (SBIRT) consult was placed.    All images/reports reviewed. No further traumatic work-up warranted.

## 2019-10-14 NOTE — PROGRESS NOTE ADULT - ASSESSMENT
49M with PMHx of DM, HTN, EtOh and opioid use was brought in by his sister after she found him on the ground near stairs. Pt was admitted for alcohol withdrawal. Pt was intubated to protect his airway. Pt became hypotensive post-intubation. Central line was placed and pt was given pressor. Pt was also found to have aspiration pneumonia. Abx was started. Pt eventually improved and off pressor. Pt extubated and off pressor support now.  In ED, , /106, afebrile, labs wnl. Pt had lac repaired with staples. (11 Oct 2019 15:27)      # Alcohol and opioid withdrawal:   -Pt was drinking up to 30 drinks a day (15 24oz beers) and was taking suboxone 24mg from the street   -now pt extubated and off pressors  -pt still on ciwa protocol  -eeg negative  -Psych does not rec in pt rehab, I talked to Dr Enciso  - eva pending for rehab placement      # Confusion  metabolic encephalopathy vs alcohol withdrawal vs hepatic encephalopathy? as pt has alcohol use disorder with hepatocellular pattern of increased LFTs  could be secondary to aspiration pneumonia vs UTI?    Recs:   -fu ammonia  -fu cmp  -fu UA / Urine culture / blood culture  -frequent reorientation / fall precautions  -avoid opioids      # Transaminitis likely secondary to hepatic steatosis   -T bili 2.9   AST 73   ALT 46  -trend cmp  -Outpatient Gastroenterology follow up/monitoring      # Pancytopenia  -likely alcohol related  -US abdomen showed hepatic steatosis with hepatomegaly  -prior labs in 2016 showed platelet count 108 but now below 100  -Hb dropped from 14 to 8  -continue to monitor labs      #Suspected Thiamine / Folate / Multivitamins deficiency  -Continue supplementation     #Lactic Acidosis: resolved  -s/p 2L NS, and 1L LR bolus       #DM II  -Monitor fingersticks  -fu HgbA1C%        Disposition: will need MAP clinic arrangement prior to discharge to establish primary care .  DVT PPx: scd as platelet count is low  GI PPx :not indicated  Activity: as tolerated with fall precautions  Diet: regular 49M with PMHx of DM, HTN, EtOh and opioid use was brought in by his sister after she found him on the ground near stairs. Pt was admitted for alcohol withdrawal. Pt was intubated to protect his airway. Pt became hypotensive post-intubation. Central line was placed and pt was given pressor. Pt was also found to have aspiration pneumonia. Abx was started. Pt eventually improved and off pressor. Pt extubated and off pressor support now.  In ED, , /106, afebrile, labs wnl. Pt had lac repaired with staples. (11 Oct 2019 15:27)    # Alcohol and opioid withdrawal:   -Pt was drinking up to 30 drinks a day (15 24oz beers) and was taking suboxone 24mg from the street   -now pt extubated and off pressors  -pt still on ciwa protocol  -eeg negative  -Psych does not rec in pt rehab, I talked to Dr Enciso  - eva pending for rehab placement    #Right lower lobe opacity- likely 2/2 aspiration- continue unasyn, will do ap/lat cxr    # Confusion  metabolic encephalopathy vs alcohol withdrawal vs hepatic encephalopathy? as pt has alcohol use disorder with hepatocellular pattern of increased LFTs  could be secondary to aspiration pneumonia vs UTI?    Recs:   -fu ammonia  -fu cmp  -fu UA / Urine culture / blood culture  -frequent reorientation / fall precautions  -avoid opioids    # Transaminitis likely secondary to hepatic steatosis   -T bili 2.9   AST 73   ALT 46  -trend cmp  -Outpatient Gastroenterology follow up/monitoring    # Pancytopenia  -likely alcohol related  -US abdomen showed hepatic steatosis with hepatomegaly  -prior labs in 2016 showed platelet count 108 but now below 100  -Hb dropped from 14 to 8  -continue to monitor labs      #Suspected Thiamine / Folate / Multivitamins deficiency  -Continue supplementation     #Lactic Acidosis: resolved  -s/p 2L NS, and 1L LR bolus       #DM II  -Monitor fingersticks  -fu HgbA1C%        Disposition: will need MAP clinic arrangement prior to discharge to establish primary care .  DVT PPx: scd as platelet count is low  GI PPx :not indicated  Activity: as tolerated with fall precautions  Diet: regular

## 2019-10-14 NOTE — PROGRESS NOTE ADULT - SUBJECTIVE AND OBJECTIVE BOX
Patient is a 49y old  Male who presents with a chief complaint of ETOH withdrawal, opioid withdrawal (13 Oct 2019 07:48)        Over Night Events:  Extubated yesterday.  Did well overnight.,  No SOB         ROS:  See HPI    PHYSICAL EXAM    ICU Vital Signs Last 24 Hrs  T(C): 37.3 (13 Oct 2019 23:00), Max: 39 (13 Oct 2019 15:26)  T(F): 99.2 (13 Oct 2019 23:00), Max: 102.2 (13 Oct 2019 15:26)  HR: 80 (14 Oct 2019 06:00) (74 - 90)  BP: 111/57 (14 Oct 2019 06:00) (86/53 - 138/65)  BP(mean): 78 (14 Oct 2019 06:00) (65 - 95)  ABP: --  ABP(mean): --  RR: 16 (14 Oct 2019 06:00) (16 - 24)  SpO2: 100% (14 Oct 2019 06:00) (93% - 100%)      General: in NAD   HEENT: CAROLINE             Lymphatic system: No cervical LN   Lungs: Bilateral BS  Cardiovascular: Regular   Gastrointestinal: Soft, Positive BS  Extremities: No clubbing.  Moves extremities.  Full Range of motion   Skin: Warm, intact  Neurological: No motor or sensory deficit.  Follows commands       10-13-19 @ 07:01  -  10-14-19 @ 07:00  --------------------------------------------------------  IN:    fentaNYL Infusion.: 49.5 mL    Glucerna: 240 mL    IV PiggyBack: 200 mL    propofol Infusion: 103.2 mL  Total IN: 592.7 mL    OUT:    Indwelling Catheter - Urethral: 1195 mL  Total OUT: 1195 mL    Total NET: -602.3 mL          LABS:                            8.5    3.81  )-----------( 48       ( 14 Oct 2019 04:30 )             25.6                                               10-14    142  |  106  |  12  ----------------------------<  113<H>  3.8   |  28  |  0.6<L>    Ca    8.2<L>      14 Oct 2019 04:30  Phos  2.3     10-14  Mg     1.9     10-14    TPro  5.1<L>  /  Alb  3.2<L>  /  TBili  2.9<H>  /  DBili  x   /  AST  73<H>  /  ALT  46<H>  /  AlkPhos  71  10-13                                                                                           LIVER FUNCTIONS - ( 13 Oct 2019 06:00 )  Alb: 3.2 g/dL / Pro: 5.1 g/dL / ALK PHOS: 71 U/L / ALT: 46 U/L / AST: 73 U/L / GGT: x                                                  Culture - Urine (collected 11 Oct 2019 15:21)  Source: .Urine Clean Catch (Midstream)  Final Report (13 Oct 2019 08:03):    <10,000 CFU/mL Normal Urogenital Rain                                                   Mode: standby                                      ABG - ( 13 Oct 2019 14:25 )  pH, Arterial: 7.54  pH, Blood: x     /  pCO2: 31    /  pO2: 86    / HCO3: 27    / Base Excess: 4.2   /  SaO2: 98                  MEDICATIONS  (STANDING):  ampicillin/sulbactam  IVPB      ampicillin/sulbactam  IVPB 3 Gram(s) IV Intermittent every 6 hours  chlorhexidine 4% Liquid 1 Application(s) Topical <User Schedule>  enoxaparin Injectable 40 milliGRAM(s) SubCutaneous daily  folic acid 1 milliGRAM(s) Oral daily  influenza   Vaccine 0.5 milliLiter(s) IntraMuscular once  LORazepam   Injectable 2 milliGRAM(s) IV Push every 4 hours  LORazepam   Injectable 1 milliGRAM(s) IV Push every 4 hours  LORazepam   Injectable   IV Push   multivitamin 1 Tablet(s) Oral daily  thiamine IVPB 250 milliGRAM(s) IV Intermittent daily    MEDICATIONS  (PRN):  acetaminophen  Suppository .. 650 milliGRAM(s) Rectal every 6 hours PRN Temp greater or equal to 38.5C (101.3F)      Xrays:          Bilateral infiltrates                                                                            ECHO

## 2019-10-14 NOTE — PROGRESS NOTE ADULT - SUBJECTIVE AND OBJECTIVE BOX
SUBJECTIVE:  Patient is a 49y old Male who presents with a chief complaint of ETOH withdrawal, opioid withdrawal (14 Oct 2019 07:20)  Currently admitted to medicine with the primary diagnosis of Alcohol withdrawal delirium       Today is hospital day 3d. This morning he is resting in bed, awake and alert but not oriented to place (at times he is oriented but again he becomes little confused)      PAST MEDICAL & SURGICAL HISTORY  DM (diabetes mellitus)  HTN (hypertension)  History of incision and drainage    SOCIAL HISTORY:  Negative for smoking/alcohol/drug use.     ALLERGIES:  No Known Allergies    MEDICATIONS:  STANDING MEDICATIONS  ampicillin/sulbactam  IVPB      ampicillin/sulbactam  IVPB 3 Gram(s) IV Intermittent every 6 hours  chlorhexidine 4% Liquid 1 Application(s) Topical <User Schedule>  enoxaparin Injectable 40 milliGRAM(s) SubCutaneous daily  folic acid 1 milliGRAM(s) Oral daily  influenza   Vaccine 0.5 milliLiter(s) IntraMuscular once  LORazepam   Injectable 2 milliGRAM(s) IV Push every 4 hours  LORazepam   Injectable 1 milliGRAM(s) IV Push every 4 hours  LORazepam   Injectable   IV Push   multivitamin 1 Tablet(s) Oral daily  thiamine IVPB 250 milliGRAM(s) IV Intermittent daily    PRN MEDICATIONS  acetaminophen  Suppository .. 650 milliGRAM(s) Rectal every 6 hours PRN    Home Medications:    Vital Signs Last 24 Hrs  T(C): 37.8 (14 Oct 2019 10:25), Max: 39 (13 Oct 2019 15:26)  T(F): 100.1 (14 Oct 2019 10:25), Max: 102.2 (13 Oct 2019 15:26)  HR: 77 (14 Oct 2019 10:25) (76 - 90)  BP: 135/61 (14 Oct 2019 10:25) (97/46 - 138/65)  BP(mean): 79 (14 Oct 2019 07:24) (67 - 95)  RR: 22 (14 Oct 2019 10:25) (16 - 24)  SpO2: 96% (14 Oct 2019 10:25) (93% - 100%)      LABS:                        8.5    3.81  )-----------( 48       ( 14 Oct 2019 04:30 )             25.6     10-14    142  |  106  |  12  ----------------------------<  113<H>  3.8   |  28  |  0.6<L>    Ca    8.2<L>      14 Oct 2019 04:30  Phos  2.3     10-14  Mg     1.9     10-14    TPro  5.1<L>  /  Alb  3.2<L>  /  TBili  2.9<H>  /  DBili  x   /  AST  73<H>  /  ALT  46<H>  /  AlkPhos  71  10-13        ABG - ( 13 Oct 2019 14:25 )  pH, Arterial: 7.54  pH, Blood: x     /  pCO2: 31    /  pO2: 86    / HCO3: 27    / Base Excess: 4.2   /  SaO2: 98          Culture - Urine (collected 11 Oct 2019 15:21)  Source: .Urine Clean Catch (Midstream)  Final Report (13 Oct 2019 08:03):  <10,000 CFU/mL Normal Urogenital Rain          RADIOLOGY:    PHYSICAL EXAM:  GEN: No acute distress  LUNGS: Clear to auscultation bilaterally   HEART: S1/S2 present. RRR.   ABD: Soft, non-tender, non-distended. Bowel sounds present  NEURO: AAO x2 SUBJECTIVE:  Patient is a 49y old Male who presents with a chief complaint of ETOH withdrawal, opioid withdrawal (14 Oct 2019 07:20)  Currently admitted to medicine with the primary diagnosis of Alcohol withdrawal delirium       Today is hospital day 3d. This morning he is resting in bed, awake and alert but not oriented to place (at times he is oriented but again he becomes little confused)    Attending note: Pt seen and examined at bedside. Pt downgrade from ICU today. At bedside pt aaox1, name. Sister at bedside states that she wants him to go to rehab after discharge. No sob or cp.     PAST MEDICAL & SURGICAL HISTORY  DM (diabetes mellitus)  HTN (hypertension)  History of incision and drainage    SOCIAL HISTORY:  Negative for smoking/alcohol/drug use.     ALLERGIES:  No Known Allergies    MEDICATIONS:  STANDING MEDICATIONS  ampicillin/sulbactam  IVPB      ampicillin/sulbactam  IVPB 3 Gram(s) IV Intermittent every 6 hours  chlorhexidine 4% Liquid 1 Application(s) Topical <User Schedule>  enoxaparin Injectable 40 milliGRAM(s) SubCutaneous daily  folic acid 1 milliGRAM(s) Oral daily  influenza   Vaccine 0.5 milliLiter(s) IntraMuscular once  LORazepam   Injectable 2 milliGRAM(s) IV Push every 4 hours  LORazepam   Injectable 1 milliGRAM(s) IV Push every 4 hours  LORazepam   Injectable   IV Push   multivitamin 1 Tablet(s) Oral daily  thiamine IVPB 250 milliGRAM(s) IV Intermittent daily    PRN MEDICATIONS  acetaminophen  Suppository .. 650 milliGRAM(s) Rectal every 6 hours PRN    Home Medications:    Vital Signs Last 24 Hrs  T(C): 37.8 (14 Oct 2019 10:25), Max: 39 (13 Oct 2019 15:26)  T(F): 100.1 (14 Oct 2019 10:25), Max: 102.2 (13 Oct 2019 15:26)  HR: 77 (14 Oct 2019 10:25) (76 - 90)  BP: 135/61 (14 Oct 2019 10:25) (97/46 - 138/65)  BP(mean): 79 (14 Oct 2019 07:24) (67 - 95)  RR: 22 (14 Oct 2019 10:25) (16 - 24)  SpO2: 96% (14 Oct 2019 10:25) (93% - 100%)      LABS:                        8.5    3.81  )-----------( 48       ( 14 Oct 2019 04:30 )             25.6     10-14    142  |  106  |  12  ----------------------------<  113<H>  3.8   |  28  |  0.6<L>    Ca    8.2<L>      14 Oct 2019 04:30  Phos  2.3     10-14  Mg     1.9     10-14    TPro  5.1<L>  /  Alb  3.2<L>  /  TBili  2.9<H>  /  DBili  x   /  AST  73<H>  /  ALT  46<H>  /  AlkPhos  71  10-13        ABG - ( 13 Oct 2019 14:25 )  pH, Arterial: 7.54  pH, Blood: x     /  pCO2: 31    /  pO2: 86    / HCO3: 27    / Base Excess: 4.2   /  SaO2: 98          Culture - Urine (collected 11 Oct 2019 15:21)  Source: .Urine Clean Catch (Midstream)  Final Report (13 Oct 2019 08:03):  <10,000 CFU/mL Normal Urogenital Rain          RADIOLOGY:    PHYSICAL EXAM:  GEN: No acute distress  LUNGS: Clear to auscultation bilaterally   HEART: S1/S2 present. RRR.   ABD: Soft, non-tender, non-distended. Bowel sounds present  NEURO: AAO x2    Attedning exam  PHYSICAL EXAM:  GENERAL: NAD, well-developed  HEAD:  Atraumatic, Normocephalic  EYES: EOMI, PERRLA, +icteric   NECK: Supple, No JVD  CHEST/LUNG: Clear to auscultation bilaterally; No wheeze  HEART: Regular rate and rhythm; No murmurs, rubs, or gallops  ABDOMEN: Soft, Nontender, Nondistended; Bowel sounds present  EXTREMITIES:  2+ Peripheral Pulses, No clubbing, cyanosis, or edema  PSYCH: AAOx1 waxes and wanes   NEUROLOGY: non-focal  SKIN: No rashes or lesions

## 2019-10-14 NOTE — PROGRESS NOTE ADULT - ASSESSMENT
IMPRESSION:    Acute hypercapnic respiratory failure resolved  ETOH withdrawal  HO ETOH and opiate abuse   Aspiration pneumonia     PLAN:    CNS:  Continue present management.  Detox eval    HEENT: Oral care     PULMONARY:  HOB @ 45 degrees.      CARDIOVASCULAR: i=O. FU ECHO     GI: GI prophylaxis.  Feeding.  FU RUQ sono.      RENAL:  Follow up lytes.  Correct as needed    INFECTIOUS DISEASE: Follow up cultures.  Finish Unasyn course     HEMATOLOGICAL:  DVT prophylaxis.    ENDOCRINE:  Follow up FS.  Insulin protocol if needed.    MUSCULOSKELETAL:  OOB to chair     Transfer to floor

## 2019-10-15 LAB
ALBUMIN SERPL ELPH-MCNC: 3.2 G/DL — LOW (ref 3.5–5.2)
ALP SERPL-CCNC: 61 U/L — SIGNIFICANT CHANGE UP (ref 30–115)
ALT FLD-CCNC: 37 U/L — SIGNIFICANT CHANGE UP (ref 0–41)
ANION GAP SERPL CALC-SCNC: 10 MMOL/L — SIGNIFICANT CHANGE UP (ref 7–14)
AST SERPL-CCNC: 57 U/L — HIGH (ref 0–41)
BASOPHILS # BLD AUTO: 0.01 K/UL — SIGNIFICANT CHANGE UP (ref 0–0.2)
BASOPHILS NFR BLD AUTO: 0.3 % — SIGNIFICANT CHANGE UP (ref 0–1)
BILIRUB SERPL-MCNC: 2.2 MG/DL — HIGH (ref 0.2–1.2)
BUN SERPL-MCNC: 11 MG/DL — SIGNIFICANT CHANGE UP (ref 10–20)
CALCIUM SERPL-MCNC: 8.4 MG/DL — LOW (ref 8.5–10.1)
CHLORIDE SERPL-SCNC: 105 MMOL/L — SIGNIFICANT CHANGE UP (ref 98–110)
CO2 SERPL-SCNC: 26 MMOL/L — SIGNIFICANT CHANGE UP (ref 17–32)
CREAT SERPL-MCNC: 0.5 MG/DL — LOW (ref 0.7–1.5)
CULTURE RESULTS: NO GROWTH — SIGNIFICANT CHANGE UP
EOSINOPHIL # BLD AUTO: 0.09 K/UL — SIGNIFICANT CHANGE UP (ref 0–0.7)
EOSINOPHIL NFR BLD AUTO: 2.8 % — SIGNIFICANT CHANGE UP (ref 0–8)
ESTIMATED AVERAGE GLUCOSE: 137 MG/DL — HIGH (ref 68–114)
GLUCOSE BLDC GLUCOMTR-MCNC: 132 MG/DL — HIGH (ref 70–99)
GLUCOSE BLDC GLUCOMTR-MCNC: 188 MG/DL — HIGH (ref 70–99)
GLUCOSE BLDC GLUCOMTR-MCNC: 212 MG/DL — HIGH (ref 70–99)
GLUCOSE BLDC GLUCOMTR-MCNC: 236 MG/DL — HIGH (ref 70–99)
GLUCOSE BLDC GLUCOMTR-MCNC: 397 MG/DL — HIGH (ref 70–99)
GLUCOSE SERPL-MCNC: 130 MG/DL — HIGH (ref 70–99)
HBA1C BLD-MCNC: 6.4 % — HIGH (ref 4–5.6)
HCT VFR BLD CALC: 25.1 % — LOW (ref 42–52)
HGB BLD-MCNC: 8.2 G/DL — LOW (ref 14–18)
IMM GRANULOCYTES NFR BLD AUTO: 0.6 % — HIGH (ref 0.1–0.3)
LYMPHOCYTES # BLD AUTO: 1.01 K/UL — LOW (ref 1.2–3.4)
LYMPHOCYTES # BLD AUTO: 31.8 % — SIGNIFICANT CHANGE UP (ref 20.5–51.1)
MCHC RBC-ENTMCNC: 32.2 PG — HIGH (ref 27–31)
MCHC RBC-ENTMCNC: 32.7 G/DL — SIGNIFICANT CHANGE UP (ref 32–37)
MCV RBC AUTO: 98.4 FL — HIGH (ref 80–94)
MONOCYTES # BLD AUTO: 0.48 K/UL — SIGNIFICANT CHANGE UP (ref 0.1–0.6)
MONOCYTES NFR BLD AUTO: 15.1 % — HIGH (ref 1.7–9.3)
NEUTROPHILS # BLD AUTO: 1.57 K/UL — SIGNIFICANT CHANGE UP (ref 1.4–6.5)
NEUTROPHILS NFR BLD AUTO: 49.4 % — SIGNIFICANT CHANGE UP (ref 42.2–75.2)
NRBC # BLD: 0 /100 WBCS — SIGNIFICANT CHANGE UP (ref 0–0)
PLATELET # BLD AUTO: 63 K/UL — LOW (ref 130–400)
POTASSIUM SERPL-MCNC: 3.9 MMOL/L — SIGNIFICANT CHANGE UP (ref 3.5–5)
POTASSIUM SERPL-SCNC: 3.9 MMOL/L — SIGNIFICANT CHANGE UP (ref 3.5–5)
PROT SERPL-MCNC: 5.3 G/DL — LOW (ref 6–8)
RBC # BLD: 2.55 M/UL — LOW (ref 4.7–6.1)
RBC # FLD: 12.4 % — SIGNIFICANT CHANGE UP (ref 11.5–14.5)
SODIUM SERPL-SCNC: 141 MMOL/L — SIGNIFICANT CHANGE UP (ref 135–146)
SPECIMEN SOURCE: SIGNIFICANT CHANGE UP
WBC # BLD: 3.18 K/UL — LOW (ref 4.8–10.8)
WBC # FLD AUTO: 3.18 K/UL — LOW (ref 4.8–10.8)

## 2019-10-15 PROCEDURE — 93306 TTE W/DOPPLER COMPLETE: CPT | Mod: 26

## 2019-10-15 PROCEDURE — 99233 SBSQ HOSP IP/OBS HIGH 50: CPT

## 2019-10-15 RX ORDER — INSULIN GLARGINE 100 [IU]/ML
16 INJECTION, SOLUTION SUBCUTANEOUS AT BEDTIME
Refills: 0 | Status: DISCONTINUED | OUTPATIENT
Start: 2019-10-15 | End: 2019-10-17

## 2019-10-15 RX ORDER — INSULIN LISPRO 100/ML
6 VIAL (ML) SUBCUTANEOUS
Refills: 0 | Status: DISCONTINUED | OUTPATIENT
Start: 2019-10-15 | End: 2019-10-17

## 2019-10-15 RX ORDER — INSULIN LISPRO 100/ML
10 VIAL (ML) SUBCUTANEOUS
Refills: 0 | Status: DISCONTINUED | OUTPATIENT
Start: 2019-10-15 | End: 2019-10-15

## 2019-10-15 RX ORDER — INSULIN GLARGINE 100 [IU]/ML
12 INJECTION, SOLUTION SUBCUTANEOUS AT BEDTIME
Refills: 0 | Status: DISCONTINUED | OUTPATIENT
Start: 2019-10-15 | End: 2019-10-15

## 2019-10-15 RX ADMIN — Medication 1 MILLIGRAM(S): at 12:18

## 2019-10-15 RX ADMIN — Medication 1 MILLIGRAM(S): at 05:19

## 2019-10-15 RX ADMIN — AMPICILLIN SODIUM AND SULBACTAM SODIUM 200 GRAM(S): 250; 125 INJECTION, POWDER, FOR SUSPENSION INTRAMUSCULAR; INTRAVENOUS at 05:15

## 2019-10-15 RX ADMIN — Medication 1 MILLIGRAM(S): at 17:57

## 2019-10-15 RX ADMIN — Medication 100 MILLIGRAM(S): at 21:47

## 2019-10-15 RX ADMIN — AMPICILLIN SODIUM AND SULBACTAM SODIUM 200 GRAM(S): 250; 125 INJECTION, POWDER, FOR SUSPENSION INTRAMUSCULAR; INTRAVENOUS at 12:18

## 2019-10-15 RX ADMIN — AMPICILLIN SODIUM AND SULBACTAM SODIUM 200 GRAM(S): 250; 125 INJECTION, POWDER, FOR SUSPENSION INTRAMUSCULAR; INTRAVENOUS at 17:48

## 2019-10-15 RX ADMIN — Medication 102.5 MILLIGRAM(S): at 12:18

## 2019-10-15 RX ADMIN — AMPICILLIN SODIUM AND SULBACTAM SODIUM 200 GRAM(S): 250; 125 INJECTION, POWDER, FOR SUSPENSION INTRAMUSCULAR; INTRAVENOUS at 23:53

## 2019-10-15 RX ADMIN — Medication 1 MILLIGRAM(S): at 02:12

## 2019-10-15 RX ADMIN — Medication 1 MILLIGRAM(S): at 13:55

## 2019-10-15 RX ADMIN — INSULIN GLARGINE 16 UNIT(S): 100 INJECTION, SOLUTION SUBCUTANEOUS at 21:59

## 2019-10-15 RX ADMIN — CHLORHEXIDINE GLUCONATE 1 APPLICATION(S): 213 SOLUTION TOPICAL at 05:18

## 2019-10-15 RX ADMIN — Medication 1 MILLIGRAM(S): at 09:41

## 2019-10-15 RX ADMIN — Medication 1 TABLET(S): at 12:18

## 2019-10-15 NOTE — PROGRESS NOTE ADULT - SUBJECTIVE AND OBJECTIVE BOX
Patient is a 49y old  Male who presents with a chief complaint of ETOH withdrawal, opioid withdrawal (14 Oct 2019 10:39)      OVERNIGHT EVENTS: remained stable    SUBJECTIVE / INTERVAL HPI: Patient seen and examined at bedside.     VITAL SIGNS:  Vital Signs Last 24 Hrs  T(C): 37.2 (15 Oct 2019 05:33), Max: 37.8 (14 Oct 2019 10:25)  T(F): 99 (15 Oct 2019 05:33), Max: 100.1 (14 Oct 2019 10:25)  HR: 74 (15 Oct 2019 05:33) (66 - 77)  BP: 140/76 (15 Oct 2019 05:33) (119/74 - 150/70)  BP(mean): --  RR: 16 (15 Oct 2019 05:33) (16 - 22)  SpO2: 98% (14 Oct 2019 21:37) (96% - 99%)    PHYSICAL EXAM:    General: WDWN  HEENT: NC/AT; PERRL, clear conjunctiva  Neck: supple  Cardiovascular: +S1/S2; RRR  Respiratory: CTA b/l; no W/R/R  Gastrointestinal: soft, NT/ND; +BSx4  Extremities: WWP; 2+ peripheral pulses; no edema   Neurological: AAOx3; no focal deficits    MEDICATIONS:  MEDICATIONS  (STANDING):  ampicillin/sulbactam  IVPB      ampicillin/sulbactam  IVPB 3 Gram(s) IV Intermittent every 6 hours  chlorhexidine 4% Liquid 1 Application(s) Topical <User Schedule>  folic acid 1 milliGRAM(s) Oral daily  influenza   Vaccine 0.5 milliLiter(s) IntraMuscular once  LORazepam   Injectable 1 milliGRAM(s) IV Push every 4 hours  LORazepam   Injectable   IV Push   multivitamin 1 Tablet(s) Oral daily  thiamine IVPB 250 milliGRAM(s) IV Intermittent daily    MEDICATIONS  (PRN):  acetaminophen  Suppository .. 650 milliGRAM(s) Rectal every 6 hours PRN Temp greater or equal to 38.5C (101.3F)      ALLERGIES:  Allergies    No Known Allergies    Intolerances        LABS:                        8.2    3.18  )-----------( 63       ( 15 Oct 2019 05:00 )             25.1     10-15    141  |  105  |  11  ----------------------------<  130<H>  3.9   |  26  |  0.5<L>    Ca    8.4<L>      15 Oct 2019 05:00  Phos  2.3     10-14  Mg     1.9     10-14    TPro  5.3<L>  /  Alb  3.2<L>  /  TBili  2.2<H>  /  DBili  x   /  AST  57<H>  /  ALT  37  /  AlkPhos  61  10-15      Urinalysis Basic - ( 14 Oct 2019 11:50 )    Color: Tierra / Appearance: Clear / S.030 / pH: x  Gluc: x / Ketone: Negative  / Bili: Small / Urobili: >12 mg/dL   Blood: x / Protein: Trace / Nitrite: Negative   Leuk Esterase: Negative / RBC: 2 /HPF / WBC 4 /HPF   Sq Epi: x / Non Sq Epi: 0 /HPF / Bacteria: Negative      CAPILLARY BLOOD GLUCOSE  136 (13 Oct 2019 18:00)      POCT Blood Glucose.: 132 mg/dL (15 Oct 2019 07:41)  Ammonia, Serum: 64 umol/L (10.14.19 @ 16:53) Patient is a 49y old  Male who presents with a chief complaint of ETOH withdrawal, opioid withdrawal (14 Oct 2019 10:39)      OVERNIGHT EVENTS: remained stable    SUBJECTIVE / INTERVAL HPI: Patient seen and examined at bedside.     Attending note: Pt seen and examined at bedside. Feeling better today. Orientation improving. No cp or sob.     VITAL SIGNS:  Vital Signs Last 24 Hrs  T(C): 37.2 (15 Oct 2019 05:33), Max: 37.8 (14 Oct 2019 10:25)  T(F): 99 (15 Oct 2019 05:33), Max: 100.1 (14 Oct 2019 10:25)  HR: 74 (15 Oct 2019 05:33) (66 - 77)  BP: 140/76 (15 Oct 2019 05:33) (119/74 - 150/70)  BP(mean): --  RR: 16 (15 Oct 2019 05:33) (16 - 22)  SpO2: 98% (14 Oct 2019 21:37) (96% - 99%)    PHYSICAL EXAM:    General: WDWN  HEENT: NC/AT; PERRL, clear conjunctiva  Neck: supple  Cardiovascular: +S1/S2; RRR  Respiratory: CTA b/l; no W/R/R  Gastrointestinal: soft, NT/ND; +BSx4  Extremities: WWP; 2+ peripheral pulses; no edema   Neurological: AAOx3; no focal deficits    MEDICATIONS:  MEDICATIONS  (STANDING):  ampicillin/sulbactam  IVPB      ampicillin/sulbactam  IVPB 3 Gram(s) IV Intermittent every 6 hours  chlorhexidine 4% Liquid 1 Application(s) Topical <User Schedule>  folic acid 1 milliGRAM(s) Oral daily  influenza   Vaccine 0.5 milliLiter(s) IntraMuscular once  LORazepam   Injectable 1 milliGRAM(s) IV Push every 4 hours  LORazepam   Injectable   IV Push   multivitamin 1 Tablet(s) Oral daily  thiamine IVPB 250 milliGRAM(s) IV Intermittent daily    MEDICATIONS  (PRN):  acetaminophen  Suppository .. 650 milliGRAM(s) Rectal every 6 hours PRN Temp greater or equal to 38.5C (101.3F)      ALLERGIES:  Allergies    No Known Allergies    Intolerances        LABS:                        8.2    3.18  )-----------( 63       ( 15 Oct 2019 05:00 )             25.1     10-15    141  |  105  |  11  ----------------------------<  130<H>  3.9   |  26  |  0.5<L>    Ca    8.4<L>      15 Oct 2019 05:00  Phos  2.3     10-14  Mg     1.9     10-14    TPro  5.3<L>  /  Alb  3.2<L>  /  TBili  2.2<H>  /  DBili  x   /  AST  57<H>  /  ALT  37  /  AlkPhos  61  10-15      Urinalysis Basic - ( 14 Oct 2019 11:50 )    Color: Tierra / Appearance: Clear / S.030 / pH: x  Gluc: x / Ketone: Negative  / Bili: Small / Urobili: >12 mg/dL   Blood: x / Protein: Trace / Nitrite: Negative   Leuk Esterase: Negative / RBC: 2 /HPF / WBC 4 /HPF   Sq Epi: x / Non Sq Epi: 0 /HPF / Bacteria: Negative      CAPILLARY BLOOD GLUCOSE  136 (13 Oct 2019 18:00)      POCT Blood Glucose.: 132 mg/dL (15 Oct 2019 07:41)  Ammonia, Serum: 64 umol/L (10.14.19 @ 16:53)

## 2019-10-15 NOTE — PROGRESS NOTE ADULT - SUBJECTIVE AND OBJECTIVE BOX
HPI:  49M with PMHx of DM, HTN, EtOh and opioid use presents with fall, hallucinations and agitations. Pt is confused and a questionable historian. Patient was brought in by his sister following being found on the ground near stairs. Per sister, he was hallucinating and talking to inanimate objects last night and overall acting strangely, which was even worse this morning. Pt now endorses feeling "sick, like I had the flu" for the past several days, complained of fatigue and myalgias as well as abdominal cramping. He admits to drinking slightly less than normal because of these symptoms. Additionally, he gets suboxone on the street however he hasn't been able to get any recently. States he normally takes about 24mg.  In ED, , /106, afebrile, labs wnl. Pt had lac repaired with staples. (11 Oct 2019 15:27)      T(C): 36.4 (10-15-19 @ 12:30), Max: 37.2 (10-15-19 @ 05:33)  HR: 75 (10-15-19 @ 12:30) (66 - 77)  BP: 121/72 (10-15-19 @ 12:30) (119/74 - 150/70)  RR: 18 (10-15-19 @ 12:50) (16 - 20)  SpO2: 97% (10-15-19 @ 12:50) (97% - 99%)    MOTOR EXAM NOT ASSESSED      Physical Medicine And Rehabilitation Services are not indicated in this patient for the following Reason(s):    [    ] Patient is medically unstable      [    ]  Patient does not have appropriate activity orders      [     ] Patient has no weight bearing status for:      [     ] Patient is independently ambulating      [     ]  Patient is from Skilled Nursing Facility and is appropriate to return      [     ] Patient was non-ambulatory prior to admission      [  x   ]  Other ETOH INTOX- AMBULATE WITH STAFF- SOC SERVICE EVAL FOR DC PLANNING      WILL CANCEL PM&R / PT request

## 2019-10-15 NOTE — PROGRESS NOTE ADULT - ASSESSMENT
49M with PMHx of DM, HTN, EtOh and opioid use was brought in by his sister after she found him on the ground near stairs. Pt was admitted for alcohol withdrawal. Pt was intubated to protect his airway. Pt became hypotensive post-intubation. Central line was placed and pt was given pressor. Pt was also found to have aspiration pneumonia. Abx was started. Pt eventually improved and off pressor. Pt extubated and off pressor support now.    # Alcohol and opioid withdrawal:   -Pt was drinking up to 30 drinks a day (15 24oz beers) and was taking suboxone 24mg from the street   -now pt extubated and off pressors  -pt still on ciwa protocol  -eeg negative  -Psych does not rec in pt rehab, I talked to Dr Enciso  - eva pending for rehab placement    #Right lower lobe opacity- likely 2/2 aspiration- continue Unasyn ,    # Confusion  metabolic encephalopathy vs alcohol withdrawal vs hepatic encephalopathy? as pt has alcohol use disorder with hepatocellular pattern of increased LFTs  could be secondary to aspiration pneumonia vs UTI?    Recs:   -fu ammonia  -fu cmp  -fu UA / Urine culture / blood culture  -frequent reorientation / fall precautions  -avoid opioids    # Transaminitis likely secondary to hepatic steatosis   -T bili 2.9   AST 73   ALT 46  -trend cmp  -Outpatient Gastroenterology follow up/monitoring    # Pancytopenia  -likely alcohol related  -US abdomen showed hepatic steatosis with hepatomegaly  -prior labs in 2016 showed platelet count 108 but now below 100  -Hb dropped from 14 to 8  -continue to monitor labs      #Suspected Thiamine / Folate / Multivitamins deficiency  -Continue supplementation     #Lactic Acidosis: resolved  -s/p 2L NS, and 1L LR bolus       #DM II  -Monitor fingersticks  -fu HgbA1C%        Disposition: will need MAP clinic arrangement prior to discharge to establish primary care .  DVT PPx: scd as platelet count is low  GI PPx :not indicated  Activity: as tolerated with fall precautions  Diet: regular 49M with PMHx of DM, HTN, EtOh and opioid use was brought in by his sister after she found him on the ground near stairs. Pt was admitted for alcohol withdrawal. Pt was intubated to protect his airway. Pt became hypotensive post-intubation. Central line was placed and pt was given pressor. Pt was also found to have aspiration pneumonia. Abx was started. Pt eventually improved and off pressor. Pt extubated and off pressor support now.    # Alcohol and opioid withdrawal:   -Pt was drinking up to 30 drinks a day (15 24oz beers) and was taking suboxone 24mg from the street   -now pt extubated and off pressors  -pt still on ciwa protocol  -eeg negative  -Psych does not rec in pt rehab, I talked to Dr Enciso  - eva pending for rehab placement    #Right lower lobe opacity- likely 2/2 aspiration- continue Unasyn ,    # Confusion  metabolic encephalopathy vs alcohol withdrawal vs hepatic encephalopathy? as pt has alcohol use disorder with hepatocellular pattern of increased LFTs  could be secondary to aspiration pneumonia vs UTI?    Recs:   -fu ammonia, ammonia level elevated but pt mentation improving, hold off giving lactulose for now.   -fu cmp  -fu UA / Urine culture / blood culture  -frequent reorientation / fall precautions  -avoid opioids    # Transaminitis likely secondary to hepatic steatosis   -T bili 2.9   AST 73   ALT 46  -trend cmp  -Outpatient Gastroenterology follow up/monitoring    # Pancytopenia  -likely alcohol related  -US abdomen showed hepatic steatosis with hepatomegaly  -prior labs in 2016 showed platelet count 108 but now below 100  -Hb dropped from 14 to 8  -continue to monitor labs      #Suspected Thiamine / Folate / Multivitamins deficiency  -Continue supplementation     #Lactic Acidosis: resolved  -s/p 2L NS, and 1L LR bolus       #DM II  -Monitor fingersticks  -fu HgbA1C%        Disposition: will need MAP clinic arrangement prior to discharge to establish primary care .  DVT PPx: scd as platelet count is low  GI PPx :not indicated  Activity: as tolerated with fall precautions  Diet: regular

## 2019-10-16 ENCOUNTER — TRANSCRIPTION ENCOUNTER (OUTPATIENT)
Age: 49
End: 2019-10-16

## 2019-10-16 LAB
ALBUMIN SERPL ELPH-MCNC: 3.6 G/DL — SIGNIFICANT CHANGE UP (ref 3.5–5.2)
ALP SERPL-CCNC: 63 U/L — SIGNIFICANT CHANGE UP (ref 30–115)
ALT FLD-CCNC: 36 U/L — SIGNIFICANT CHANGE UP (ref 0–41)
ANION GAP SERPL CALC-SCNC: 10 MMOL/L — SIGNIFICANT CHANGE UP (ref 7–14)
ANISOCYTOSIS BLD QL: SLIGHT — SIGNIFICANT CHANGE UP
AST SERPL-CCNC: 48 U/L — HIGH (ref 0–41)
BASOPHILS # BLD AUTO: 0 K/UL — SIGNIFICANT CHANGE UP (ref 0–0.2)
BASOPHILS NFR BLD AUTO: 0 % — SIGNIFICANT CHANGE UP (ref 0–1)
BILIRUB SERPL-MCNC: 1.5 MG/DL — HIGH (ref 0.2–1.2)
BUN SERPL-MCNC: 8 MG/DL — LOW (ref 10–20)
CALCIUM SERPL-MCNC: 8.8 MG/DL — SIGNIFICANT CHANGE UP (ref 8.5–10.1)
CHLORIDE SERPL-SCNC: 101 MMOL/L — SIGNIFICANT CHANGE UP (ref 98–110)
CO2 SERPL-SCNC: 29 MMOL/L — SIGNIFICANT CHANGE UP (ref 17–32)
CREAT SERPL-MCNC: 0.6 MG/DL — LOW (ref 0.7–1.5)
EOSINOPHIL # BLD AUTO: 0.07 K/UL — SIGNIFICANT CHANGE UP (ref 0–0.7)
EOSINOPHIL NFR BLD AUTO: 1.7 % — SIGNIFICANT CHANGE UP (ref 0–8)
GIANT PLATELETS BLD QL SMEAR: PRESENT — SIGNIFICANT CHANGE UP
GLUCOSE BLDC GLUCOMTR-MCNC: 148 MG/DL — HIGH (ref 70–99)
GLUCOSE BLDC GLUCOMTR-MCNC: 154 MG/DL — HIGH (ref 70–99)
GLUCOSE BLDC GLUCOMTR-MCNC: 155 MG/DL — HIGH (ref 70–99)
GLUCOSE BLDC GLUCOMTR-MCNC: 160 MG/DL — HIGH (ref 70–99)
GLUCOSE SERPL-MCNC: 175 MG/DL — HIGH (ref 70–99)
HCT VFR BLD CALC: 25.9 % — LOW (ref 42–52)
HGB BLD-MCNC: 8.8 G/DL — LOW (ref 14–18)
LYMPHOCYTES # BLD AUTO: 1.4 K/UL — SIGNIFICANT CHANGE UP (ref 1.2–3.4)
LYMPHOCYTES # BLD AUTO: 31.9 % — SIGNIFICANT CHANGE UP (ref 20.5–51.1)
MAGNESIUM SERPL-MCNC: 1.9 MG/DL — SIGNIFICANT CHANGE UP (ref 1.8–2.4)
MANUAL SMEAR VERIFICATION: SIGNIFICANT CHANGE UP
MCHC RBC-ENTMCNC: 32.8 PG — HIGH (ref 27–31)
MCHC RBC-ENTMCNC: 34 G/DL — SIGNIFICANT CHANGE UP (ref 32–37)
MCV RBC AUTO: 96.6 FL — HIGH (ref 80–94)
METAMYELOCYTES # FLD: 0.9 % — HIGH (ref 0–0)
MONOCYTES # BLD AUTO: 0.64 K/UL — HIGH (ref 0.1–0.6)
MONOCYTES NFR BLD AUTO: 14.6 % — HIGH (ref 1.7–9.3)
NEUTROPHILS # BLD AUTO: 2.23 K/UL — SIGNIFICANT CHANGE UP (ref 1.4–6.5)
NEUTROPHILS NFR BLD AUTO: 50.9 % — SIGNIFICANT CHANGE UP (ref 42.2–75.2)
NRBC # BLD: 2 /100 — HIGH (ref 0–0)
NRBC # BLD: SIGNIFICANT CHANGE UP /100 WBCS (ref 0–0)
PHOSPHATE SERPL-MCNC: 3.3 MG/DL — SIGNIFICANT CHANGE UP (ref 2.1–4.9)
PLAT MORPH BLD: NORMAL — SIGNIFICANT CHANGE UP
PLATELET # BLD AUTO: 75 K/UL — LOW (ref 130–400)
POLYCHROMASIA BLD QL SMEAR: SLIGHT — SIGNIFICANT CHANGE UP
POTASSIUM SERPL-MCNC: 4.5 MMOL/L — SIGNIFICANT CHANGE UP (ref 3.5–5)
POTASSIUM SERPL-SCNC: 4.5 MMOL/L — SIGNIFICANT CHANGE UP (ref 3.5–5)
PROT SERPL-MCNC: 5.9 G/DL — LOW (ref 6–8)
RBC # BLD: 2.68 M/UL — LOW (ref 4.7–6.1)
RBC # FLD: 12.3 % — SIGNIFICANT CHANGE UP (ref 11.5–14.5)
RBC BLD AUTO: NORMAL — SIGNIFICANT CHANGE UP
SODIUM SERPL-SCNC: 140 MMOL/L — SIGNIFICANT CHANGE UP (ref 135–146)
WBC # BLD: 4.39 K/UL — LOW (ref 4.8–10.8)
WBC # FLD AUTO: 4.39 K/UL — LOW (ref 4.8–10.8)

## 2019-10-16 PROCEDURE — 99233 SBSQ HOSP IP/OBS HIGH 50: CPT

## 2019-10-16 RX ORDER — THIAMINE MONONITRATE (VIT B1) 100 MG
1 TABLET ORAL
Qty: 30 | Refills: 0
Start: 2019-10-16 | End: 2019-11-14

## 2019-10-16 RX ORDER — FOLIC ACID 0.8 MG
1 TABLET ORAL
Qty: 30 | Refills: 0
Start: 2019-10-16 | End: 2019-11-14

## 2019-10-16 RX ORDER — THIAMINE MONONITRATE (VIT B1) 100 MG
100 TABLET ORAL DAILY
Refills: 0 | Status: DISCONTINUED | OUTPATIENT
Start: 2019-10-16 | End: 2019-10-16

## 2019-10-16 RX ADMIN — INSULIN GLARGINE 16 UNIT(S): 100 INJECTION, SOLUTION SUBCUTANEOUS at 21:09

## 2019-10-16 RX ADMIN — Medication 1 MILLIGRAM(S): at 11:07

## 2019-10-16 RX ADMIN — Medication 6 UNIT(S): at 11:41

## 2019-10-16 RX ADMIN — Medication 1 TABLET(S): at 11:07

## 2019-10-16 RX ADMIN — Medication 1 TABLET(S): at 17:01

## 2019-10-16 RX ADMIN — CHLORHEXIDINE GLUCONATE 1 APPLICATION(S): 213 SOLUTION TOPICAL at 05:25

## 2019-10-16 RX ADMIN — Medication 1 MILLIGRAM(S): at 06:01

## 2019-10-16 RX ADMIN — Medication 6 UNIT(S): at 07:34

## 2019-10-16 RX ADMIN — AMPICILLIN SODIUM AND SULBACTAM SODIUM 200 GRAM(S): 250; 125 INJECTION, POWDER, FOR SUSPENSION INTRAMUSCULAR; INTRAVENOUS at 05:24

## 2019-10-16 RX ADMIN — Medication 6 UNIT(S): at 17:01

## 2019-10-16 RX ADMIN — Medication 1 MILLIGRAM(S): at 17:02

## 2019-10-16 NOTE — PROGRESS NOTE ADULT - ASSESSMENT
49M with PMHx of DM, HTN, EtOh and opioid use was brought in by his sister after she found him on the ground near stairs. Pt was admitted for alcohol withdrawal. Pt was intubated to protect his airway. Pt became hypotensive post-intubation. Central line was placed and pt was given pressor. Pt was also found to have aspiration pneumonia. Abx was started. Pt eventually improved and off pressor. Pt extubated and off pressor support now.    # Alcohol and opioid withdrawal:   -Pt was drinking up to 30 drinks a day (15 24oz beers) and was taking suboxone 24mg from the street  -pt still on ciwa protocol PO PRN  -eeg negative    #Right lower lobe opacity- likely 2/2 aspiration- s/p Unasyn >> switched to po augmentin.     # Transaminitis likely secondary to hepatic steatosis   -T bili 2.9   AST 73   ALT 46  -trend cmp  -Outpatient Gastroenterology follow up/monitoring    # Pancytopenia >> has been stable  -likely alcohol related  -US abdomen showed hepatic steatosis with hepatomegaly  -continue to monitor labs, needs outpt follow up with GI      #Suspected Thiamine / Folate / Multivitamins deficiency  -Continue supplementation     #Lactic Acidosis: resolved  -s/p 2L NS, and 1L LR bolus       #DM II  -Monitor fingersticks  -fu HgbA1C%        Disposition: will need MAP clinic arrangement prior to discharge to establish primary care .  DVT PPx: scd as platelet count is low  GI PPx :not indicated  Activity: as tolerated with fall precautions  Diet: regular    #Progress Note Handoff  Pending (specify): possible inpatient alcohol rehab  Family discussion:  Disposition: Home vs inpatient alcohol rehab.

## 2019-10-16 NOTE — PROGRESS NOTE ADULT - SUBJECTIVE AND OBJECTIVE BOX
Patient is a 49y old  Male who presents with a chief complaint of ETOH withdrawal, opioid withdrawal (15 Oct 2019 14:05)      OVERNIGHT EVENTS: looks better, alert and oriented x3,     SUBJECTIVE / INTERVAL HPI: Patient seen and examined at bedside.     VITAL SIGNS:  Vital Signs Last 24 Hrs  T(C): 37.7 (16 Oct 2019 04:45), Max: 37.7 (16 Oct 2019 04:45)  T(F): 99.8 (16 Oct 2019 04:45), Max: 99.8 (16 Oct 2019 04:45)  HR: 83 (16 Oct 2019 04:45) (73 - 83)  BP: 130/75 (16 Oct 2019 04:45) (121/72 - 152/67)  BP(mean): --  RR: 18 (16 Oct 2019 04:45) (17 - 18)  SpO2: 97% (15 Oct 2019 12:50) (97% - 97%)    PHYSICAL EXAM:    General: WDWN  HEENT: NC/AT; PERRL, clear conjunctiva  Neck: supple  Cardiovascular: +S1/S2; RRR  Respiratory: CTA b/l; no W/R/R  Gastrointestinal: soft, NT/ND; +BSx4  Extremities: WWP; 2+ peripheral pulses; no edema   Neurological: AAOx3; no focal deficits    MEDICATIONS:  MEDICATIONS  (STANDING):  amoxicillin  875 milliGRAM(s)/clavulanate 1 Tablet(s) Oral two times a day  chlorhexidine 4% Liquid 1 Application(s) Topical <User Schedule>  folic acid 1 milliGRAM(s) Oral daily  influenza   Vaccine 0.5 milliLiter(s) IntraMuscular once  insulin glargine Injectable (LANTUS) 16 Unit(s) SubCutaneous at bedtime  insulin lispro Injectable (HumaLOG) 6 Unit(s) SubCutaneous three times a day before meals  LORazepam   Injectable 1 milliGRAM(s) IV Push every 12 hours  LORazepam   Injectable   IV Push   multivitamin 1 Tablet(s) Oral daily    MEDICATIONS  (PRN):  acetaminophen  Suppository .. 650 milliGRAM(s) Rectal every 6 hours PRN Temp greater or equal to 38.5C (101.3F)  guaiFENesin    Syrup 100 milliGRAM(s) Oral every 6 hours PRN Cough      ALLERGIES:  Allergies    No Known Allergies    Intolerances        LABS:                        8.8    4.39  )-----------( 75       ( 16 Oct 2019 07:00 )             25.9     10-16    140  |  101  |  8<L>  ----------------------------<  175<H>  4.5   |  29  |  0.6<L>    Ca    8.8      16 Oct 2019 07:00  Phos  3.3     10-16  Mg     1.9     10-16    TPro  5.9<L>  /  Alb  3.6  /  TBili  1.5<H>  /  DBili  x   /  AST  48<H>  /  ALT  36  /  AlkPhos  63  10-16      Urinalysis Basic - ( 14 Oct 2019 11:50 )    Color: Tierra / Appearance: Clear / S.030 / pH: x  Gluc: x / Ketone: Negative  / Bili: Small / Urobili: >12 mg/dL   Blood: x / Protein: Trace / Nitrite: Negative   Leuk Esterase: Negative / RBC: 2 /HPF / WBC 4 /HPF   Sq Epi: x / Non Sq Epi: 0 /HPF / Bacteria: Negative      CAPILLARY BLOOD GLUCOSE      POCT Blood Glucose.: 160 mg/dL (16 Oct 2019 07:25)      RADIOLOGY & ADDITIONAL TESTS: Reviewed.    ASSESSMENT:    PLAN:

## 2019-10-16 NOTE — DISCHARGE NOTE PROVIDER - CARE PROVIDER_API CALL
Arnaldo To)  Gastroenterology; Internal Medicine  [address] [place] [address]  Phone: [phone]  Fax: [phone]  Follow Up Time: 2 weeks    [facility], Internal medicine,   [facility], Internal medicine   [address] [place] [address]  [phone]  Phone: (   )    -  Fax: (   )    -  Follow Up Time: 2 weeks

## 2019-10-16 NOTE — DISCHARGE NOTE PROVIDER - HOSPITAL COURSE
49M with PMHx of DM, HTN, EtOh and opioid use presents with fall, hallucinations and agitations, was found to be hypotensive and very agitated, was intubated for airway protection, admitted to ICU for 2 days, then downgraded to medical floor, was on CIWA protocol, improved and he is now alert and oriented x3, his labs are noermal except for stable pancytopenia and steatosis f liver, he is medically stable for discharge, to follow up GI clinic as outptiet 49M with PMHx of DM, HTN, EtOh and opioid use presents with fall, hallucinations and agitations, was found to be hypotensive and very agitated, was intubated for airway protection, admitted to ICU for 2 days, then downgraded to medical floor, was on CIWA protocol, improved and he is now alert and oriented x3, his labs are normal except for stable pancytopenia and steatosis of the liver, he is medically stable for discharge, to follow up GI clinic as outptiet

## 2019-10-16 NOTE — DISCHARGE NOTE PROVIDER - CARE PROVIDERS DIRECT ADDRESSES
[email or web address]

## 2019-10-16 NOTE — PROGRESS NOTE ADULT - ASSESSMENT
49M with PMHx of DM, HTN, EtOh and opioid use was brought in by his sister after she found him on the ground near stairs. Pt was admitted for alcohol withdrawal. Pt was intubated to protect his airway. Pt became hypotensive post-intubation. Central line was placed and pt was given pressor. Pt was also found to have aspiration pneumonia. Abx was started. Pt eventually improved and off pressor. Pt extubated and off pressor support now.    # Alcohol and opioid withdrawal:   -Pt was drinking up to 30 drinks a day (15 24oz beers) and was taking suboxone 24mg from the street  -pt still on ciwa protocol PO PRN  -eeg negative  -Psych does not rec in pt rehab per Dr Enciso  -addiction eval pending for rehab placement    #Right lower lobe opacity- likely 2/2 aspiration- continue Unasyn ,    # Transaminitis likely secondary to hepatic steatosis   -T bili 2.9   AST 73   ALT 46  -trend cmp  -Outpatient Gastroenterology follow up/monitoring    # Pancytopenia >> has been stable  -likely alcohol related  -US abdomen showed hepatic steatosis with hepatomegaly  -continue to monitor labs, needs outpt follow up with GI      #Suspected Thiamine / Folate / Multivitamins deficiency  -Continue supplementation     #Lactic Acidosis: resolved  -s/p 2L NS, and 1L LR bolus       #DM II  -Monitor fingersticks  -fu HgbA1C%        Disposition: will need MAP clinic arrangement prior to discharge to establish primary care .  DVT PPx: scd as platelet count is low  GI PPx :not indicated  Activity: as tolerated with fall precautions  Diet: regular

## 2019-10-16 NOTE — DISCHARGE NOTE PROVIDER - PROVIDER TOKENS
PROVIDER:[TOKEN:[[id number]],FOLLOWUP:[2 weeks]],FREE:[LAST:[[facility], Internal medicine],PHONE:[(   )    -],FAX:[(   )    -],ADDRESS:[[facility], Internal medicine   [address] [place], [place] [id number] [phone]],FOLLOWUP:[2 weeks]]

## 2019-10-16 NOTE — PROGRESS NOTE ADULT - SUBJECTIVE AND OBJECTIVE BOX
GINO STEWART  49y  Male      Patient is a 49y old  Male who presents with a chief complaint of ETOH withdrawal, opioid withdrawal (16 Oct 2019 11:46)      INTERVAL HPI/OVERNIGHT EVENTS:      ******************************* REVIEW OF SYSTEMS:**********************************************      All other review of systems negative    *********************** VITALS ******************************************    T(F): 97.8 (10-16-19 @ 14:12)  HR: 77 (10-16-19 @ 14:12) (73 - 83)  BP: 121/67 (10-16-19 @ 14:12) (121/67 - 152/67)  RR: 16 (10-16-19 @ 14:12) (16 - 18)  SpO2: --    10-15-19 @ 07:01  -  10-16-19 @ 07:00  --------------------------------------------------------  IN: 520 mL / OUT: 1100 mL / NET: -580 mL    10-16-19 @ 07:01  -  10-16-19 @ 17:04  --------------------------------------------------------  IN: 560 mL / OUT: 0 mL / NET: 560 mL            10-15-19 @ 07:01  -  10-16-19 @ 07:00  --------------------------------------------------------  IN: 520 mL / OUT: 1100 mL / NET: -580 mL    10-16-19 @ 07:01  -  10-16-19 @ 17:04  --------------------------------------------------------  IN: 560 mL / OUT: 0 mL / NET: 560 mL        ******************************** PHYSICAL EXAM:**************************************************  GENERAL: NAD    PSYCH: no agitation, baseline mentation  HEENT:     NERVOUS SYSTEM:  Alert & Oriented X3, MS  5/5 B/L  UE and LE ; Sensory intact    PULMONARY: CHIO, CTA    CARDIOVASCULAR: S1S2 RRR    GI: Soft, NT, ND; BS present.    EXTREMITIES:  2+ Peripheral Pulses, No clubbing, cyanosis, or edema    LYMPH: No lymphadenopathy noted    SKIN: No rashes or lesions    ******************************************************************************************        **************************** LABS *******************************************************                          8.8    4.39  )-----------( 75       ( 16 Oct 2019 07:00 )             25.9     10-16    140  |  101  |  8<L>  ----------------------------<  175<H>  4.5   |  29  |  0.6<L>    Ca    8.8      16 Oct 2019 07:00  Phos  3.3     10-16  Mg     1.9     10-16    TPro  5.9<L>  /  Alb  3.6  /  TBili  1.5<H>  /  DBili  x   /  AST  48<H>  /  ALT  36  /  AlkPhos  63  10-16          Lactate Trend  10-12 @ 04:48 Lactate:0.9         CAPILLARY BLOOD GLUCOSE      POCT Blood Glucose.: 155 mg/dL (16 Oct 2019 16:50)          **************************Active Medications *******************************************  No Known Allergies      acetaminophen  Suppository .. 650 milliGRAM(s) Rectal every 6 hours PRN  amoxicillin  875 milliGRAM(s)/clavulanate 1 Tablet(s) Oral two times a day  chlorhexidine 4% Liquid 1 Application(s) Topical <User Schedule>  folic acid 1 milliGRAM(s) Oral daily  guaiFENesin    Syrup 100 milliGRAM(s) Oral every 6 hours PRN  influenza   Vaccine 0.5 milliLiter(s) IntraMuscular once  insulin glargine Injectable (LANTUS) 16 Unit(s) SubCutaneous at bedtime  insulin lispro Injectable (HumaLOG) 6 Unit(s) SubCutaneous three times a day before meals  LORazepam     Tablet 1 milliGRAM(s) Oral every 6 hours PRN  LORazepam   Injectable   IV Push   multivitamin 1 Tablet(s) Oral daily      ***************************************************  RADIOLOGY & ADDITIONAL TESTS:    Imaging Personally Reviewed:  [ ] YES  [ ] NO    HEALTH ISSUES - PROBLEM Dx:

## 2019-10-16 NOTE — DISCHARGE NOTE PROVIDER - NSDCCPCAREPLAN_GEN_ALL_CORE_FT
PRINCIPAL DISCHARGE DIAGNOSIS  Diagnosis: Alcohol withdrawal delirium  Assessment and Plan of Treatment: you was treated for alcohol withdrawal,you stayed in ICU fr 2 days as you was intubated for airway protection, you was treated with Ativan protocol and your mental status improved and now back to baseline, you was advised to cut down and stop alcohol drinikng and to join rehab program which you agreed, take medication as instructed      SECONDARY DISCHARGE DIAGNOSES  Diagnosis: Pancytopenia  Assessment and Plan of Treatment: your blood work showed low hemoblgin, platlets and luekocyte, which is likley related to acohol liver injury, the level have been monitred and its stable, but you need to follow  with GI and lver clnic as instructed

## 2019-10-17 ENCOUNTER — INPATIENT (INPATIENT)
Facility: HOSPITAL | Age: 49
LOS: 21 days | Discharge: HOME | End: 2019-11-08
Attending: INTERNAL MEDICINE | Admitting: INTERNAL MEDICINE
Payer: MEDICAID

## 2019-10-17 ENCOUNTER — TRANSCRIPTION ENCOUNTER (OUTPATIENT)
Age: 49
End: 2019-10-17

## 2019-10-17 VITALS
TEMPERATURE: 98 F | HEIGHT: 72 IN | DIASTOLIC BLOOD PRESSURE: 68 MMHG | HEART RATE: 86 BPM | RESPIRATION RATE: 16 BRPM | SYSTOLIC BLOOD PRESSURE: 128 MMHG | WEIGHT: 240.08 LBS

## 2019-10-17 VITALS
RESPIRATION RATE: 16 BRPM | OXYGEN SATURATION: 95 % | DIASTOLIC BLOOD PRESSURE: 71 MMHG | HEART RATE: 87 BPM | SYSTOLIC BLOOD PRESSURE: 137 MMHG

## 2019-10-17 DIAGNOSIS — Z98.890 OTHER SPECIFIED POSTPROCEDURAL STATES: Chronic | ICD-10-CM

## 2019-10-17 LAB
ANION GAP SERPL CALC-SCNC: 11 MMOL/L — SIGNIFICANT CHANGE UP (ref 7–14)
BASOPHILS # BLD AUTO: 0.02 K/UL — SIGNIFICANT CHANGE UP (ref 0–0.2)
BASOPHILS NFR BLD AUTO: 0.5 % — SIGNIFICANT CHANGE UP (ref 0–1)
BUN SERPL-MCNC: 11 MG/DL — SIGNIFICANT CHANGE UP (ref 10–20)
CALCIUM SERPL-MCNC: 8.9 MG/DL — SIGNIFICANT CHANGE UP (ref 8.5–10.1)
CHLORIDE SERPL-SCNC: 97 MMOL/L — LOW (ref 98–110)
CO2 SERPL-SCNC: 28 MMOL/L — SIGNIFICANT CHANGE UP (ref 17–32)
CREAT SERPL-MCNC: 0.6 MG/DL — LOW (ref 0.7–1.5)
EOSINOPHIL # BLD AUTO: 0.11 K/UL — SIGNIFICANT CHANGE UP (ref 0–0.7)
EOSINOPHIL NFR BLD AUTO: 2.8 % — SIGNIFICANT CHANGE UP (ref 0–8)
GLUCOSE BLDC GLUCOMTR-MCNC: 132 MG/DL — HIGH (ref 70–99)
GLUCOSE BLDC GLUCOMTR-MCNC: 134 MG/DL — HIGH (ref 70–99)
GLUCOSE SERPL-MCNC: 132 MG/DL — HIGH (ref 70–99)
HCT VFR BLD CALC: 27.2 % — LOW (ref 42–52)
HGB BLD-MCNC: 9.1 G/DL — LOW (ref 14–18)
IMM GRANULOCYTES NFR BLD AUTO: 1.8 % — HIGH (ref 0.1–0.3)
LYMPHOCYTES # BLD AUTO: 1.55 K/UL — SIGNIFICANT CHANGE UP (ref 1.2–3.4)
LYMPHOCYTES # BLD AUTO: 39.4 % — SIGNIFICANT CHANGE UP (ref 20.5–51.1)
MAGNESIUM SERPL-MCNC: 1.9 MG/DL — SIGNIFICANT CHANGE UP (ref 1.8–2.4)
MCHC RBC-ENTMCNC: 32.7 PG — HIGH (ref 27–31)
MCHC RBC-ENTMCNC: 33.5 G/DL — SIGNIFICANT CHANGE UP (ref 32–37)
MCV RBC AUTO: 97.8 FL — HIGH (ref 80–94)
MONOCYTES # BLD AUTO: 0.61 K/UL — HIGH (ref 0.1–0.6)
MONOCYTES NFR BLD AUTO: 15.5 % — HIGH (ref 1.7–9.3)
NEUTROPHILS # BLD AUTO: 1.57 K/UL — SIGNIFICANT CHANGE UP (ref 1.4–6.5)
NEUTROPHILS NFR BLD AUTO: 40 % — LOW (ref 42.2–75.2)
NRBC # BLD: 0 /100 WBCS — SIGNIFICANT CHANGE UP (ref 0–0)
PHOSPHATE SERPL-MCNC: 4.2 MG/DL — SIGNIFICANT CHANGE UP (ref 2.1–4.9)
PLATELET # BLD AUTO: 97 K/UL — LOW (ref 130–400)
POTASSIUM SERPL-MCNC: 4 MMOL/L — SIGNIFICANT CHANGE UP (ref 3.5–5)
POTASSIUM SERPL-SCNC: 4 MMOL/L — SIGNIFICANT CHANGE UP (ref 3.5–5)
RBC # BLD: 2.78 M/UL — LOW (ref 4.7–6.1)
RBC # FLD: 12.9 % — SIGNIFICANT CHANGE UP (ref 11.5–14.5)
SODIUM SERPL-SCNC: 136 MMOL/L — SIGNIFICANT CHANGE UP (ref 135–146)
WBC # BLD: 3.93 K/UL — LOW (ref 4.8–10.8)
WBC # FLD AUTO: 3.93 K/UL — LOW (ref 4.8–10.8)

## 2019-10-17 PROCEDURE — 99233 SBSQ HOSP IP/OBS HIGH 50: CPT

## 2019-10-17 RX ORDER — NICOTINE POLACRILEX 2 MG
1 GUM BUCCAL DAILY
Refills: 0 | Status: DISCONTINUED | OUTPATIENT
Start: 2019-10-17 | End: 2019-11-08

## 2019-10-17 RX ORDER — METFORMIN HYDROCHLORIDE 850 MG/1
1 TABLET ORAL
Qty: 30 | Refills: 0
Start: 2019-10-17 | End: 2019-12-07

## 2019-10-17 RX ORDER — HYDROXYZINE HCL 10 MG
50 TABLET ORAL EVERY 6 HOURS
Refills: 0 | Status: DISCONTINUED | OUTPATIENT
Start: 2019-10-17 | End: 2019-11-08

## 2019-10-17 RX ORDER — METHOCARBAMOL 500 MG/1
500 TABLET, FILM COATED ORAL EVERY 6 HOURS
Refills: 0 | Status: DISCONTINUED | OUTPATIENT
Start: 2019-10-17 | End: 2019-11-08

## 2019-10-17 RX ORDER — MAGNESIUM HYDROXIDE 400 MG/1
30 TABLET, CHEWABLE ORAL ONCE
Refills: 0 | Status: DISCONTINUED | OUTPATIENT
Start: 2019-10-17 | End: 2019-11-08

## 2019-10-17 RX ORDER — MULTIVIT-MIN/FERROUS GLUCONATE 9 MG/15 ML
1 LIQUID (ML) ORAL DAILY
Refills: 0 | Status: DISCONTINUED | OUTPATIENT
Start: 2019-10-17 | End: 2019-11-08

## 2019-10-17 RX ORDER — ACETAMINOPHEN 500 MG
650 TABLET ORAL EVERY 4 HOURS
Refills: 0 | Status: DISCONTINUED | OUTPATIENT
Start: 2019-10-17 | End: 2019-11-08

## 2019-10-17 RX ORDER — METFORMIN HYDROCHLORIDE 850 MG/1
1 TABLET ORAL
Qty: 30 | Refills: 0
Start: 2019-10-17 | End: 2019-11-15

## 2019-10-17 RX ORDER — IBUPROFEN 200 MG
400 TABLET ORAL EVERY 6 HOURS
Refills: 0 | Status: DISCONTINUED | OUTPATIENT
Start: 2019-10-17 | End: 2019-11-08

## 2019-10-17 RX ORDER — HYDROXYZINE HCL 10 MG
100 TABLET ORAL AT BEDTIME
Refills: 0 | Status: DISCONTINUED | OUTPATIENT
Start: 2019-10-17 | End: 2019-10-24

## 2019-10-17 RX ORDER — PSEUDOEPHEDRINE HCL 30 MG
60 TABLET ORAL EVERY 6 HOURS
Refills: 0 | Status: DISCONTINUED | OUTPATIENT
Start: 2019-10-17 | End: 2019-11-08

## 2019-10-17 RX ORDER — ENOXAPARIN SODIUM 100 MG/ML
40 INJECTION SUBCUTANEOUS DAILY
Refills: 0 | Status: DISCONTINUED | OUTPATIENT
Start: 2019-10-17 | End: 2019-10-17

## 2019-10-17 RX ORDER — METFORMIN HYDROCHLORIDE 850 MG/1
500 TABLET ORAL
Refills: 0 | Status: DISCONTINUED | OUTPATIENT
Start: 2019-10-17 | End: 2019-11-08

## 2019-10-17 RX ORDER — GUAIFENESIN/DEXTROMETHORPHAN 600MG-30MG
5 TABLET, EXTENDED RELEASE 12 HR ORAL EVERY 4 HOURS
Refills: 0 | Status: DISCONTINUED | OUTPATIENT
Start: 2019-10-17 | End: 2019-11-08

## 2019-10-17 RX ADMIN — Medication 6 UNIT(S): at 12:09

## 2019-10-17 RX ADMIN — Medication 100 MILLIGRAM(S): at 11:51

## 2019-10-17 RX ADMIN — Medication 0.5 MILLIGRAM(S): at 05:49

## 2019-10-17 RX ADMIN — CHLORHEXIDINE GLUCONATE 1 APPLICATION(S): 213 SOLUTION TOPICAL at 05:48

## 2019-10-17 RX ADMIN — Medication 1 TABLET(S): at 11:51

## 2019-10-17 RX ADMIN — ENOXAPARIN SODIUM 40 MILLIGRAM(S): 100 INJECTION SUBCUTANEOUS at 11:52

## 2019-10-17 RX ADMIN — INFLUENZA VIRUS VACCINE 0.5 MILLILITER(S): 15; 15; 15; 15 SUSPENSION INTRAMUSCULAR at 12:50

## 2019-10-17 RX ADMIN — Medication 6 UNIT(S): at 08:07

## 2019-10-17 RX ADMIN — METFORMIN HYDROCHLORIDE 500 MILLIGRAM(S): 850 TABLET ORAL at 21:05

## 2019-10-17 RX ADMIN — Medication 1 TABLET(S): at 05:49

## 2019-10-17 RX ADMIN — Medication 100 MILLIGRAM(S): at 23:14

## 2019-10-17 RX ADMIN — Medication 1 MILLIGRAM(S): at 11:51

## 2019-10-17 NOTE — DISCHARGE NOTE NURSING/CASE MANAGEMENT/SOCIAL WORK - NSDCPEWEB_GEN_ALL_CORE
[facility] for Tobacco Control website --- [email or web address] website --- [email or web address]

## 2019-10-17 NOTE — PROGRESS NOTE ADULT - REASON FOR ADMISSION
ETOH withdrawal, opioid withdrawal

## 2019-10-17 NOTE — CONSULT NOTE ADULT - REASON FOR ADMISSION
ETOH withdrawal, opioid withdrawal

## 2019-10-17 NOTE — PHYSICAL THERAPY INITIAL EVALUATION ADULT - SPECIFY REASON(S)
Pt amb on unit independently without asst device. PMR cancelled PT order. Dr. Calhoun x [phone] notified.

## 2019-10-17 NOTE — PROGRESS NOTE ADULT - SUBJECTIVE AND OBJECTIVE BOX
GINO STEWART  49y  Male      Patient is a 49y old  Male who presents with a chief complaint of ETOH withdrawal, opioid withdrawal (17 Oct 2019 08:06)      INTERVAL HPI/OVERNIGHT EVENTS:      ******************************* REVIEW OF SYSTEMS:**********************************************    resting in bed. Feels " unsteady " while ambulating.   All other review of systems negative    *********************** VITALS ******************************************    T(F): 98.3 (10-17-19 @ 05:31)  HR: 66 (10-17-19 @ 05:31) (66 - 77)  BP: 158/79 (10-17-19 @ 05:31) (121/67 - 158/79)  RR: 17 (10-17-19 @ 05:31) (16 - 18)  SpO2: 99% (10-17-19 @ 07:45) (99% - 99%)    10-16-19 @ 07:01  -  10-17-19 @ 07:00  --------------------------------------------------------  IN: 560 mL / OUT: 0 mL / NET: 560 mL            10-16-19 @ 07:01  -  10-17-19 @ 07:00  --------------------------------------------------------  IN: 560 mL / OUT: 0 mL / NET: 560 mL        ******************************** PHYSICAL EXAM:**************************************************  GENERAL: NAD    PSYCH: no agitation, baseline mentation  HEENT:     NERVOUS SYSTEM:  Alert & Oriented X3,  PULMONARY: CHIO, CTA    CARDIOVASCULAR: S1S2 RRR    GI: Soft, NT, ND; BS present.    EXTREMITIES:  2+ Peripheral Pulses, No clubbing, cyanosis, or edema    LYMPH: No lymphadenopathy noted    SKIN: No rashes or lesions    ******************************************************************************************        **************************** LABS *******************************************************                          9.1    3.93  )-----------( 97       ( 17 Oct 2019 06:34 )             27.2     10-17    136  |  97<L>  |  11  ----------------------------<  132<H>  4.0   |  28  |  0.6<L>    Ca    8.9      17 Oct 2019 06:34  Phos  4.2     10-17  Mg     1.9     10-17    TPro  5.9<L>  /  Alb  3.6  /  TBili  1.5<H>  /  DBili  x   /  AST  48<H>  /  ALT  36  /  AlkPhos  63  10-16          Lactate Trend        CAPILLARY BLOOD GLUCOSE      POCT Blood Glucose.: 132 mg/dL (17 Oct 2019 07:59)          **************************Active Medications *******************************************  No Known Allergies      acetaminophen  Suppository .. 650 milliGRAM(s) Rectal every 6 hours PRN  amoxicillin  875 milliGRAM(s)/clavulanate 1 Tablet(s) Oral two times a day  chlorhexidine 4% Liquid 1 Application(s) Topical <User Schedule>  enoxaparin Injectable 40 milliGRAM(s) SubCutaneous daily  folic acid 1 milliGRAM(s) Oral daily  guaiFENesin    Syrup 100 milliGRAM(s) Oral every 6 hours PRN  influenza   Vaccine 0.5 milliLiter(s) IntraMuscular once  insulin glargine Injectable (LANTUS) 16 Unit(s) SubCutaneous at bedtime  insulin lispro Injectable (HumaLOG) 6 Unit(s) SubCutaneous three times a day before meals  LORazepam     Tablet 1 milliGRAM(s) Oral every 6 hours PRN  LORazepam   Injectable 0.5 milliGRAM(s) IV Push every 12 hours  LORazepam   Injectable   IV Push   multivitamin 1 Tablet(s) Oral daily  thiamine 100 milliGRAM(s) Oral daily      ***************************************************  RADIOLOGY & ADDITIONAL TESTS:    Imaging Personally Reviewed:  [ ] YES  [ ] NO    HEALTH ISSUES - PROBLEM Dx:

## 2019-10-17 NOTE — PROGRESS NOTE ADULT - ASSESSMENT
49M with PMHx of DM, HTN, EtOh and opioid use was brought in by his sister after she found him on the ground near stairs. Pt was admitted for alcohol withdrawal. Pt was intubated to protect his airway. Pt became hypotensive post-intubation. Central line was placed and pt was given pressor. Pt was also found to have aspiration pneumonia. Abx was started. Pt eventually improved and off pressor. Pt extubated and off pressor support now.    # Alcohol and opioid withdrawal:   -Pt was drinking up to 30 drinks a day (15 24oz beers) and was taking suboxone 24mg from the street  -pt still on ciwa protocol PO PRN  -eeg negative  -Psych does not rec in pt rehab per Dr Enciso  medically cleared for discharge >> pending placement for rehab    #Right lower lobe opacity- likely 2/2 aspiration- continue Augmentin last dose tomorrow     # Transaminitis likely secondary to hepatic steatosis   -T bili 2.9   AST 73   ALT 46  -trend cmp  -Outpatient Gastroenterology follow up/monitoring    # Pancytopenia >> has been stable  -likely alcohol related  -US abdomen showed hepatic steatosis with hepatomegaly  -continue to monitor labs, needs outpt follow up with GI      #Suspected Thiamine / Folate / Multivitamins deficiency  -Continue supplementation     #Lactic Acidosis: resolved  -s/p 2L NS, and 1L LR bolus       #DM II  -Monitor fingersticks  -fu HgbA1C%        Disposition: will need MAP clinic arrangement prior to discharge to establish primary care .  DVT PPx: scd as platelet count is low  GI PPx :not indicated  Activity: as tolerated with fall precautions  Diet: regular

## 2019-10-17 NOTE — DISCHARGE NOTE NURSING/CASE MANAGEMENT/SOCIAL WORK - NSDCPEEMAIL_GEN_ALL_CORE
[facility] for Tobacco Control email [email or web address]

## 2019-10-17 NOTE — CONSULT NOTE ADULT - SUBJECTIVE AND OBJECTIVE BOX
T(C): 36.8 (10-17-19 @ 05:31), Max: 37 (10-16-19 @ 20:58)  HR: 66 (10-17-19 @ 05:31) (66 - 77)  BP: 158/79 (10-17-19 @ 05:31) (121/67 - 158/79)  RR: 17 (10-17-19 @ 05:31) (16 - 18)  SpO2: 99% (10-17-19 @ 07:45) (99% - 99%)    MOTOR EXAM      Physical Medicine And Rehabilitation Services are not indicated in this patient for the following Reason(s):    [    ] Patient is medically unstable      [    ]  Patient does not have appropriate activity orders      [     ] Patient has no weight bearing status for:      [  x   ] Patient is independently ambulating      [     ]  Patient is from Skilled Nursing Facility and is appropriate to return      [     ] Patient was non-ambulatory prior to admission      [     ]  Other      WILL CANCEL PM&R / PT request

## 2019-10-17 NOTE — DISCHARGE NOTE NURSING/CASE MANAGEMENT/SOCIAL WORK - PATIENT PORTAL LINK FT
You can access the FollowMyHealth Patient Portal offered by [facility] by registering at the following website: [email or web address]. By joining [employer]’s FollowMyHealth portal, you will also be able to view your health information using other applications (apps) compatible with our system.

## 2019-10-17 NOTE — PROGRESS NOTE ADULT - SUBJECTIVE AND OBJECTIVE BOX
Patient is a 49y old  Male who presents with a chief complaint of ETOH withdrawal, opioid withdrawal (16 Oct 2019 17:03)      OVERNIGHT EVENTS: remained stable     SUBJECTIVE / INTERVAL HPI: Patient seen and examined at bedside.     VITAL SIGNS:  Vital Signs Last 24 Hrs  T(C): 36.8 (17 Oct 2019 05:31), Max: 37 (16 Oct 2019 20:58)  T(F): 98.3 (17 Oct 2019 05:31), Max: 98.6 (16 Oct 2019 20:58)  HR: 66 (17 Oct 2019 05:31) (66 - 77)  BP: 158/79 (17 Oct 2019 05:31) (121/67 - 158/79)  BP(mean): --  RR: 17 (17 Oct 2019 05:31) (16 - 18)  SpO2: --    PHYSICAL EXAM:    General: WDWN  HEENT: NC/AT; PERRL, clear conjunctiva  Neck: supple  Cardiovascular: +S1/S2; RRR  Respiratory: CTA b/l; no W/R/R  Gastrointestinal: soft, NT/ND; +BSx4  Extremities: WWP; 2+ peripheral pulses; no edema   Neurological: AAOx3; no focal deficits    MEDICATIONS:  MEDICATIONS  (STANDING):  amoxicillin  875 milliGRAM(s)/clavulanate 1 Tablet(s) Oral two times a day  chlorhexidine 4% Liquid 1 Application(s) Topical <User Schedule>  folic acid 1 milliGRAM(s) Oral daily  influenza   Vaccine 0.5 milliLiter(s) IntraMuscular once  insulin glargine Injectable (LANTUS) 16 Unit(s) SubCutaneous at bedtime  insulin lispro Injectable (HumaLOG) 6 Unit(s) SubCutaneous three times a day before meals  LORazepam   Injectable 0.5 milliGRAM(s) IV Push every 12 hours  LORazepam   Injectable   IV Push   multivitamin 1 Tablet(s) Oral daily  thiamine 100 milliGRAM(s) Oral daily    MEDICATIONS  (PRN):  acetaminophen  Suppository .. 650 milliGRAM(s) Rectal every 6 hours PRN Temp greater or equal to 38.5C (101.3F)  guaiFENesin    Syrup 100 milliGRAM(s) Oral every 6 hours PRN Cough  LORazepam     Tablet 1 milliGRAM(s) Oral every 6 hours PRN Agitation      ALLERGIES:  Allergies    No Known Allergies    Intolerances        LABS:                        9.1    3.93  )-----------( 97       ( 17 Oct 2019 06:34 )             27.2     10-17    136  |  97<L>  |  11  ----------------------------<  132<H>  4.0   |  28  |  0.6<L>    Ca    8.9      17 Oct 2019 06:34  Phos  4.2     10-17  Mg     1.9     10-17    TPro  5.9<L>  /  Alb  3.6  /  TBili  1.5<H>  /  DBili  x   /  AST  48<H>  /  ALT  36  /  AlkPhos  63  10-16        CAPILLARY BLOOD GLUCOSE      POCT Blood Glucose.: 132 mg/dL (17 Oct 2019 07:59)      RADIOLOGY & ADDITIONAL TESTS: Reviewed.    ASSESSMENT:    PLAN:

## 2019-10-17 NOTE — PROGRESS NOTE ADULT - PROVIDER SPECIALTY LIST ADULT
Hospitalist
Hospitalist
Internal Medicine
Internal Medicine
Physiatry
Internal Medicine
Internal Medicine
Critical Care
Critical Care

## 2019-10-17 NOTE — PROGRESS NOTE ADULT - ASSESSMENT
49M with PMHx of DM, HTN, EtOh and opioid use was brought in by his sister after she found him on the ground near stairs. Pt was admitted for alcohol withdrawal. Pt was intubated to protect his airway. Pt became hypotensive post-intubation. Central line was placed and pt was given pressor. Pt was also found to have aspiration pneumonia. Abx was started. Pt eventually improved and off pressor. Pt extubated and off pressor support now.    # Alcohol and opioid withdrawal:   -Pt was drinking up to 30 drinks a day (15 24oz beers) and was taking suboxone 24mg from the street  -pt still on ciwa protocol PO PRN  -eeg negative    #Right lower lobe opacity- likely 2/2 aspiration- s/p Unasyn >> switched to po augmentin.     # Transaminitis likely secondary to hepatic steatosis    trending down.   -Outpatient Gastroenterology follow up/monitoring    # Pancytopenia >> has been stable  -likely alcohol related  -US abdomen showed hepatic steatosis with hepatomegaly  -continue to monitor labs, needs outpt follow up with GI      #Suspected Thiamine / Folate    -Continue supplementation     #Lactic Acidosis: resolved  -s/p 2L NS, and 1L LR bolus       #DM II  -Monitor fingersticks  -HgbA1C >>6.4                                                             MAP clinic appointment on Oct 28th Monday @1230pm. .  DVT PPx: scd as platelet count is low  GI PPx :not indicated  Activity: as tolerated with fall precautions  Diet: regular    #Progress Note Handoff  Pending (specify): possible inpatient alcohol rehab  Family discussion: d/w the wife , expresses concern for safety at home. Requesting PT.  Disposition: Home vs inpatient alcohol rehab.

## 2019-10-18 PROBLEM — I10 ESSENTIAL (PRIMARY) HYPERTENSION: Chronic | Status: ACTIVE | Noted: 2019-10-11

## 2019-10-18 PROBLEM — E11.9 TYPE 2 DIABETES MELLITUS WITHOUT COMPLICATIONS: Chronic | Status: ACTIVE | Noted: 2019-10-11

## 2019-10-18 LAB — GLUCOSE BLDC GLUCOMTR-MCNC: 137 MG/DL — HIGH (ref 70–99)

## 2019-10-18 RX ADMIN — Medication 100 MILLIGRAM(S): at 23:16

## 2019-10-18 RX ADMIN — Medication 1 TABLET(S): at 08:24

## 2019-10-18 RX ADMIN — METFORMIN HYDROCHLORIDE 500 MILLIGRAM(S): 850 TABLET ORAL at 20:46

## 2019-10-18 RX ADMIN — METFORMIN HYDROCHLORIDE 500 MILLIGRAM(S): 850 TABLET ORAL at 08:24

## 2019-10-18 RX ADMIN — Medication 1 PATCH: at 08:24

## 2019-10-19 RX ADMIN — METFORMIN HYDROCHLORIDE 500 MILLIGRAM(S): 850 TABLET ORAL at 20:50

## 2019-10-19 RX ADMIN — Medication 1 PATCH: at 08:43

## 2019-10-19 RX ADMIN — Medication 1 PATCH: at 08:45

## 2019-10-19 RX ADMIN — Medication 30 MILLILITER(S): at 23:29

## 2019-10-19 RX ADMIN — Medication 100 MILLIGRAM(S): at 23:28

## 2019-10-19 RX ADMIN — METFORMIN HYDROCHLORIDE 500 MILLIGRAM(S): 850 TABLET ORAL at 08:43

## 2019-10-19 RX ADMIN — Medication 30 MILLILITER(S): at 17:33

## 2019-10-19 RX ADMIN — Medication 1 TABLET(S): at 08:43

## 2019-10-20 LAB
CULTURE RESULTS: SIGNIFICANT CHANGE UP
GLUCOSE BLDC GLUCOMTR-MCNC: 150 MG/DL — HIGH (ref 70–99)
SPECIMEN SOURCE: SIGNIFICANT CHANGE UP

## 2019-10-20 RX ADMIN — Medication 30 MILLILITER(S): at 14:06

## 2019-10-20 RX ADMIN — Medication 1 PATCH: at 08:50

## 2019-10-20 RX ADMIN — Medication 1 PATCH: at 08:51

## 2019-10-20 RX ADMIN — Medication 30 MILLILITER(S): at 21:01

## 2019-10-20 RX ADMIN — Medication 100 MILLIGRAM(S): at 23:08

## 2019-10-20 RX ADMIN — METFORMIN HYDROCHLORIDE 500 MILLIGRAM(S): 850 TABLET ORAL at 08:48

## 2019-10-20 RX ADMIN — METFORMIN HYDROCHLORIDE 500 MILLIGRAM(S): 850 TABLET ORAL at 21:01

## 2019-10-20 RX ADMIN — Medication 1 TABLET(S): at 08:48

## 2019-10-21 RX ORDER — BUPRENORPHINE AND NALOXONE 2; .5 MG/1; MG/1
1 TABLET SUBLINGUAL
Refills: 0 | Status: DISCONTINUED | OUTPATIENT
Start: 2019-10-21 | End: 2019-10-28

## 2019-10-21 RX ORDER — BUPRENORPHINE AND NALOXONE 2; .5 MG/1; MG/1
1 TABLET SUBLINGUAL ONCE
Refills: 0 | Status: DISCONTINUED | OUTPATIENT
Start: 2019-10-21 | End: 2019-10-21

## 2019-10-21 RX ADMIN — METFORMIN HYDROCHLORIDE 500 MILLIGRAM(S): 850 TABLET ORAL at 09:08

## 2019-10-21 RX ADMIN — Medication 1 PATCH: at 09:09

## 2019-10-21 RX ADMIN — METFORMIN HYDROCHLORIDE 500 MILLIGRAM(S): 850 TABLET ORAL at 21:08

## 2019-10-21 RX ADMIN — Medication 100 MILLIGRAM(S): at 23:31

## 2019-10-21 RX ADMIN — Medication 1 TABLET(S): at 09:08

## 2019-10-21 RX ADMIN — BUPRENORPHINE AND NALOXONE 1 TABLET(S): 2; .5 TABLET SUBLINGUAL at 13:56

## 2019-10-22 DIAGNOSIS — E56.9 VITAMIN DEFICIENCY, UNSPECIFIED: ICD-10-CM

## 2019-10-22 DIAGNOSIS — D61.818 OTHER PANCYTOPENIA: ICD-10-CM

## 2019-10-22 DIAGNOSIS — E11.9 TYPE 2 DIABETES MELLITUS WITHOUT COMPLICATIONS: ICD-10-CM

## 2019-10-22 DIAGNOSIS — S01.01XA LACERATION WITHOUT FOREIGN BODY OF SCALP, INITIAL ENCOUNTER: ICD-10-CM

## 2019-10-22 DIAGNOSIS — E51.9 THIAMINE DEFICIENCY, UNSPECIFIED: ICD-10-CM

## 2019-10-22 DIAGNOSIS — E87.2 ACIDOSIS: ICD-10-CM

## 2019-10-22 DIAGNOSIS — I95.9 HYPOTENSION, UNSPECIFIED: ICD-10-CM

## 2019-10-22 DIAGNOSIS — J69.0 PNEUMONITIS DUE TO INHALATION OF FOOD AND VOMIT: ICD-10-CM
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
## 2019-10-22 DIAGNOSIS — F17.200 NICOTINE DEPENDENCE, UNSPECIFIED, UNCOMPLICATED: ICD-10-CM

## 2019-10-22 DIAGNOSIS — I10 ESSENTIAL (PRIMARY) HYPERTENSION: ICD-10-CM

## 2019-10-22 DIAGNOSIS — R74.0 NONSPECIFIC ELEVATION OF LEVELS OF TRANSAMINASE AND LACTIC ACID DEHYDROGENASE [LDH]: ICD-10-CM

## 2019-10-22 DIAGNOSIS — F19.19 OTHER PSYCHOACTIVE SUBSTANCE ABUSE WITH UNSPECIFIED PSYCHOACTIVE SUBSTANCE-INDUCED DISORDER: ICD-10-CM

## 2019-10-22 DIAGNOSIS — R44.3 HALLUCINATIONS, UNSPECIFIED: ICD-10-CM
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
## 2019-10-22 DIAGNOSIS — E53.8 DEFICIENCY OF OTHER SPECIFIED B GROUP VITAMINS: ICD-10-CM

## 2019-10-22 DIAGNOSIS — F11.23 OPIOID DEPENDENCE WITH WITHDRAWAL: ICD-10-CM

## 2019-10-22 DIAGNOSIS — K76.0 FATTY (CHANGE OF) LIVER, NOT ELSEWHERE CLASSIFIED: ICD-10-CM

## 2019-10-22 DIAGNOSIS — G93.41 METABOLIC ENCEPHALOPATHY: ICD-10-CM
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
## 2019-10-22 DIAGNOSIS — K21.9 GASTRO-ESOPHAGEAL REFLUX DISEASE WITHOUT ESOPHAGITIS: ICD-10-CM
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
## 2019-10-22 DIAGNOSIS — J96.02 ACUTE RESPIRATORY FAILURE WITH HYPERCAPNIA: ICD-10-CM

## 2019-10-22 DIAGNOSIS — J96.01 ACUTE RESPIRATORY FAILURE WITH HYPOXIA: ICD-10-CM

## 2019-10-22 DIAGNOSIS — F10.231 ALCOHOL DEPENDENCE WITH WITHDRAWAL DELIRIUM: ICD-10-CM

## 2019-10-22 DIAGNOSIS — R45.1 RESTLESSNESS AND AGITATION: ICD-10-CM

## 2019-10-22 DIAGNOSIS — R00.0 TACHYCARDIA, UNSPECIFIED: ICD-10-CM

## 2019-10-22 DIAGNOSIS — Z79.84 LONG TERM (CURRENT) USE OF ORAL HYPOGLYCEMIC DRUGS: ICD-10-CM

## 2019-10-22 DIAGNOSIS — Z78.1 PHYSICAL RESTRAINT STATUS: ICD-10-CM

## 2019-10-22 PROBLEM — Z00.00 ENCOUNTER FOR PREVENTIVE HEALTH EXAMINATION: Status: ACTIVE | Noted: 2019-10-22

## 2019-10-22 LAB
GLUCOSE BLDC GLUCOMTR-MCNC: 133 MG/DL — HIGH (ref 70–99)
GLUCOSE BLDC GLUCOMTR-MCNC: 181 MG/DL — HIGH (ref 70–99)

## 2019-10-22 RX ORDER — PANTOPRAZOLE SODIUM 20 MG/1
40 TABLET, DELAYED RELEASE ORAL
Refills: 0 | Status: DISCONTINUED | OUTPATIENT
Start: 2019-10-22 | End: 2019-11-08

## 2019-10-22 RX ADMIN — PANTOPRAZOLE SODIUM 40 MILLIGRAM(S): 20 TABLET, DELAYED RELEASE ORAL at 09:04

## 2019-10-22 RX ADMIN — Medication 1 PATCH: at 09:06

## 2019-10-22 RX ADMIN — Medication 30 MILLILITER(S): at 21:09

## 2019-10-22 RX ADMIN — METFORMIN HYDROCHLORIDE 500 MILLIGRAM(S): 850 TABLET ORAL at 09:04

## 2019-10-22 RX ADMIN — BUPRENORPHINE AND NALOXONE 1 TABLET(S): 2; .5 TABLET SUBLINGUAL at 09:06

## 2019-10-22 RX ADMIN — Medication 1 TABLET(S): at 09:02

## 2019-10-22 RX ADMIN — Medication 1 PATCH: at 09:07

## 2019-10-22 RX ADMIN — METFORMIN HYDROCHLORIDE 500 MILLIGRAM(S): 850 TABLET ORAL at 21:09

## 2019-10-22 RX ADMIN — Medication 100 MILLIGRAM(S): at 21:09

## 2019-10-23 RX ORDER — GABAPENTIN 400 MG/1
300 CAPSULE ORAL THREE TIMES A DAY
Refills: 0 | Status: DISCONTINUED | OUTPATIENT
Start: 2019-10-23 | End: 2019-11-08

## 2019-10-23 RX ORDER — TRAZODONE HCL 50 MG
100 TABLET ORAL ONCE
Refills: 0 | Status: COMPLETED | OUTPATIENT
Start: 2019-10-23 | End: 2019-10-23

## 2019-10-23 RX ADMIN — Medication 1 PATCH: at 08:35

## 2019-10-23 RX ADMIN — Medication 100 MILLIGRAM(S): at 22:20

## 2019-10-23 RX ADMIN — GABAPENTIN 300 MILLIGRAM(S): 400 CAPSULE ORAL at 21:17

## 2019-10-23 RX ADMIN — Medication 100 MILLIGRAM(S): at 22:21

## 2019-10-23 RX ADMIN — METFORMIN HYDROCHLORIDE 500 MILLIGRAM(S): 850 TABLET ORAL at 21:17

## 2019-10-23 RX ADMIN — BUPRENORPHINE AND NALOXONE 1 TABLET(S): 2; .5 TABLET SUBLINGUAL at 08:33

## 2019-10-23 RX ADMIN — GABAPENTIN 300 MILLIGRAM(S): 400 CAPSULE ORAL at 14:41

## 2019-10-23 RX ADMIN — Medication 1 PATCH: at 08:34

## 2019-10-23 RX ADMIN — Medication 1 TABLET(S): at 08:34

## 2019-10-23 RX ADMIN — PANTOPRAZOLE SODIUM 40 MILLIGRAM(S): 20 TABLET, DELAYED RELEASE ORAL at 08:33

## 2019-10-23 RX ADMIN — METFORMIN HYDROCHLORIDE 500 MILLIGRAM(S): 850 TABLET ORAL at 08:33

## 2019-10-24 RX ORDER — TRAZODONE HCL 50 MG
100 TABLET ORAL AT BEDTIME
Refills: 0 | Status: COMPLETED | OUTPATIENT
Start: 2019-10-24 | End: 2019-10-29

## 2019-10-24 RX ADMIN — Medication 650 MILLIGRAM(S): at 21:23

## 2019-10-24 RX ADMIN — GABAPENTIN 300 MILLIGRAM(S): 400 CAPSULE ORAL at 21:24

## 2019-10-24 RX ADMIN — Medication 1 PATCH: at 08:54

## 2019-10-24 RX ADMIN — METFORMIN HYDROCHLORIDE 500 MILLIGRAM(S): 850 TABLET ORAL at 08:53

## 2019-10-24 RX ADMIN — GABAPENTIN 300 MILLIGRAM(S): 400 CAPSULE ORAL at 08:53

## 2019-10-24 RX ADMIN — PANTOPRAZOLE SODIUM 40 MILLIGRAM(S): 20 TABLET, DELAYED RELEASE ORAL at 08:53

## 2019-10-24 RX ADMIN — Medication 100 MILLIGRAM(S): at 23:22

## 2019-10-24 RX ADMIN — BUPRENORPHINE AND NALOXONE 1 TABLET(S): 2; .5 TABLET SUBLINGUAL at 08:52

## 2019-10-24 RX ADMIN — METFORMIN HYDROCHLORIDE 500 MILLIGRAM(S): 850 TABLET ORAL at 21:24

## 2019-10-24 RX ADMIN — GABAPENTIN 300 MILLIGRAM(S): 400 CAPSULE ORAL at 16:11

## 2019-10-24 RX ADMIN — Medication 650 MILLIGRAM(S): at 09:04

## 2019-10-24 RX ADMIN — Medication 50 MILLIGRAM(S): at 23:23

## 2019-10-24 RX ADMIN — Medication 1 TABLET(S): at 08:53

## 2019-10-25 RX ADMIN — Medication 1 PATCH: at 08:40

## 2019-10-25 RX ADMIN — GABAPENTIN 300 MILLIGRAM(S): 400 CAPSULE ORAL at 08:38

## 2019-10-25 RX ADMIN — METFORMIN HYDROCHLORIDE 500 MILLIGRAM(S): 850 TABLET ORAL at 08:38

## 2019-10-25 RX ADMIN — METHOCARBAMOL 500 MILLIGRAM(S): 500 TABLET, FILM COATED ORAL at 02:11

## 2019-10-25 RX ADMIN — Medication 1 TABLET(S): at 08:38

## 2019-10-25 RX ADMIN — BUPRENORPHINE AND NALOXONE 1 TABLET(S): 2; .5 TABLET SUBLINGUAL at 08:40

## 2019-10-25 RX ADMIN — PANTOPRAZOLE SODIUM 40 MILLIGRAM(S): 20 TABLET, DELAYED RELEASE ORAL at 08:38

## 2019-10-25 RX ADMIN — Medication 1 PATCH: at 08:39

## 2019-10-25 RX ADMIN — GABAPENTIN 300 MILLIGRAM(S): 400 CAPSULE ORAL at 13:15

## 2019-10-26 RX ADMIN — GABAPENTIN 300 MILLIGRAM(S): 400 CAPSULE ORAL at 21:08

## 2019-10-26 RX ADMIN — Medication 100 MILLIGRAM(S): at 00:25

## 2019-10-26 RX ADMIN — Medication 5 MILLILITER(S): at 17:15

## 2019-10-26 RX ADMIN — Medication 50 MILLIGRAM(S): at 00:25

## 2019-10-26 RX ADMIN — METFORMIN HYDROCHLORIDE 500 MILLIGRAM(S): 850 TABLET ORAL at 00:25

## 2019-10-26 RX ADMIN — METHOCARBAMOL 500 MILLIGRAM(S): 500 TABLET, FILM COATED ORAL at 00:50

## 2019-10-26 RX ADMIN — BUPRENORPHINE AND NALOXONE 1 TABLET(S): 2; .5 TABLET SUBLINGUAL at 08:44

## 2019-10-26 RX ADMIN — GABAPENTIN 300 MILLIGRAM(S): 400 CAPSULE ORAL at 00:25

## 2019-10-26 RX ADMIN — METFORMIN HYDROCHLORIDE 500 MILLIGRAM(S): 850 TABLET ORAL at 21:08

## 2019-10-26 RX ADMIN — GABAPENTIN 300 MILLIGRAM(S): 400 CAPSULE ORAL at 14:53

## 2019-10-26 RX ADMIN — GABAPENTIN 300 MILLIGRAM(S): 400 CAPSULE ORAL at 08:45

## 2019-10-26 RX ADMIN — METFORMIN HYDROCHLORIDE 500 MILLIGRAM(S): 850 TABLET ORAL at 08:45

## 2019-10-26 RX ADMIN — Medication 1 TABLET(S): at 08:45

## 2019-10-26 RX ADMIN — PANTOPRAZOLE SODIUM 40 MILLIGRAM(S): 20 TABLET, DELAYED RELEASE ORAL at 08:45

## 2019-10-26 RX ADMIN — Medication 50 MILLIGRAM(S): at 21:08

## 2019-10-27 RX ADMIN — METHOCARBAMOL 500 MILLIGRAM(S): 500 TABLET, FILM COATED ORAL at 23:37

## 2019-10-27 RX ADMIN — PANTOPRAZOLE SODIUM 40 MILLIGRAM(S): 20 TABLET, DELAYED RELEASE ORAL at 08:05

## 2019-10-27 RX ADMIN — BUPRENORPHINE AND NALOXONE 1 TABLET(S): 2; .5 TABLET SUBLINGUAL at 08:06

## 2019-10-27 RX ADMIN — Medication 100 MILLIGRAM(S): at 00:21

## 2019-10-27 RX ADMIN — Medication 1 TABLET(S): at 08:05

## 2019-10-27 RX ADMIN — METFORMIN HYDROCHLORIDE 500 MILLIGRAM(S): 850 TABLET ORAL at 22:10

## 2019-10-27 RX ADMIN — Medication 5 MILLILITER(S): at 08:06

## 2019-10-27 RX ADMIN — Medication 5 MILLILITER(S): at 23:37

## 2019-10-27 RX ADMIN — METFORMIN HYDROCHLORIDE 500 MILLIGRAM(S): 850 TABLET ORAL at 08:05

## 2019-10-27 RX ADMIN — GABAPENTIN 300 MILLIGRAM(S): 400 CAPSULE ORAL at 13:35

## 2019-10-27 RX ADMIN — GABAPENTIN 300 MILLIGRAM(S): 400 CAPSULE ORAL at 08:05

## 2019-10-27 RX ADMIN — Medication 5 MILLILITER(S): at 13:36

## 2019-10-27 RX ADMIN — Medication 100 MILLIGRAM(S): at 23:37

## 2019-10-27 RX ADMIN — METHOCARBAMOL 500 MILLIGRAM(S): 500 TABLET, FILM COATED ORAL at 00:20

## 2019-10-27 RX ADMIN — GABAPENTIN 300 MILLIGRAM(S): 400 CAPSULE ORAL at 22:10

## 2019-10-27 RX ADMIN — Medication 50 MILLIGRAM(S): at 23:37

## 2019-10-28 ENCOUNTER — APPOINTMENT (OUTPATIENT)
Dept: INTERNAL MEDICINE | Facility: CLINIC | Age: 49
End: 2019-10-28

## 2019-10-28 RX ADMIN — METFORMIN HYDROCHLORIDE 500 MILLIGRAM(S): 850 TABLET ORAL at 09:05

## 2019-10-28 RX ADMIN — Medication 5 MILLILITER(S): at 09:06

## 2019-10-28 RX ADMIN — Medication 1 TABLET(S): at 09:05

## 2019-10-28 RX ADMIN — PANTOPRAZOLE SODIUM 40 MILLIGRAM(S): 20 TABLET, DELAYED RELEASE ORAL at 09:05

## 2019-10-28 RX ADMIN — GABAPENTIN 300 MILLIGRAM(S): 400 CAPSULE ORAL at 21:11

## 2019-10-28 RX ADMIN — Medication 5 MILLILITER(S): at 21:12

## 2019-10-28 RX ADMIN — METFORMIN HYDROCHLORIDE 500 MILLIGRAM(S): 850 TABLET ORAL at 21:11

## 2019-10-28 RX ADMIN — METHOCARBAMOL 500 MILLIGRAM(S): 500 TABLET, FILM COATED ORAL at 23:20

## 2019-10-28 RX ADMIN — GABAPENTIN 300 MILLIGRAM(S): 400 CAPSULE ORAL at 09:05

## 2019-10-28 RX ADMIN — Medication 100 MILLIGRAM(S): at 23:20

## 2019-10-28 RX ADMIN — BUPRENORPHINE AND NALOXONE 1 TABLET(S): 2; .5 TABLET SUBLINGUAL at 09:05

## 2019-10-28 RX ADMIN — GABAPENTIN 300 MILLIGRAM(S): 400 CAPSULE ORAL at 14:57

## 2019-10-28 RX ADMIN — Medication 50 MILLIGRAM(S): at 23:20

## 2019-10-29 RX ORDER — BUPRENORPHINE AND NALOXONE 2; .5 MG/1; MG/1
1 TABLET SUBLINGUAL
Refills: 0 | Status: DISCONTINUED | OUTPATIENT
Start: 2019-10-29 | End: 2019-11-05

## 2019-10-29 RX ADMIN — Medication 1 PATCH: at 10:40

## 2019-10-29 RX ADMIN — Medication 650 MILLIGRAM(S): at 23:28

## 2019-10-29 RX ADMIN — METFORMIN HYDROCHLORIDE 500 MILLIGRAM(S): 850 TABLET ORAL at 09:12

## 2019-10-29 RX ADMIN — METFORMIN HYDROCHLORIDE 500 MILLIGRAM(S): 850 TABLET ORAL at 21:04

## 2019-10-29 RX ADMIN — GABAPENTIN 300 MILLIGRAM(S): 400 CAPSULE ORAL at 09:12

## 2019-10-29 RX ADMIN — Medication 5 MILLILITER(S): at 10:38

## 2019-10-29 RX ADMIN — Medication 50 MILLIGRAM(S): at 23:28

## 2019-10-29 RX ADMIN — GABAPENTIN 300 MILLIGRAM(S): 400 CAPSULE ORAL at 14:12

## 2019-10-29 RX ADMIN — BUPRENORPHINE AND NALOXONE 1 TABLET(S): 2; .5 TABLET SUBLINGUAL at 10:37

## 2019-10-29 RX ADMIN — GABAPENTIN 300 MILLIGRAM(S): 400 CAPSULE ORAL at 21:04

## 2019-10-29 RX ADMIN — Medication 1 TABLET(S): at 09:12

## 2019-10-29 RX ADMIN — METHOCARBAMOL 500 MILLIGRAM(S): 500 TABLET, FILM COATED ORAL at 23:28

## 2019-10-29 RX ADMIN — Medication 5 MILLILITER(S): at 21:06

## 2019-10-29 RX ADMIN — Medication 5 MILLILITER(S): at 14:13

## 2019-10-30 RX ORDER — TRAZODONE HCL 50 MG
100 TABLET ORAL AT BEDTIME
Refills: 0 | Status: DISCONTINUED | OUTPATIENT
Start: 2019-10-30 | End: 2019-11-08

## 2019-10-30 RX ADMIN — BUPRENORPHINE AND NALOXONE 1 TABLET(S): 2; .5 TABLET SUBLINGUAL at 08:43

## 2019-10-30 RX ADMIN — Medication 5 MILLILITER(S): at 21:13

## 2019-10-30 RX ADMIN — Medication 5 MILLILITER(S): at 05:46

## 2019-10-30 RX ADMIN — METHOCARBAMOL 500 MILLIGRAM(S): 500 TABLET, FILM COATED ORAL at 13:24

## 2019-10-30 RX ADMIN — Medication 50 MILLIGRAM(S): at 21:13

## 2019-10-30 RX ADMIN — Medication 1 TABLET(S): at 08:42

## 2019-10-30 RX ADMIN — GABAPENTIN 300 MILLIGRAM(S): 400 CAPSULE ORAL at 08:42

## 2019-10-30 RX ADMIN — Medication 5 MILLILITER(S): at 13:24

## 2019-10-30 RX ADMIN — GABAPENTIN 300 MILLIGRAM(S): 400 CAPSULE ORAL at 13:24

## 2019-10-30 RX ADMIN — PANTOPRAZOLE SODIUM 40 MILLIGRAM(S): 20 TABLET, DELAYED RELEASE ORAL at 08:42

## 2019-10-30 RX ADMIN — GABAPENTIN 300 MILLIGRAM(S): 400 CAPSULE ORAL at 21:13

## 2019-10-30 RX ADMIN — METFORMIN HYDROCHLORIDE 500 MILLIGRAM(S): 850 TABLET ORAL at 21:13

## 2019-10-30 RX ADMIN — METFORMIN HYDROCHLORIDE 500 MILLIGRAM(S): 850 TABLET ORAL at 08:42

## 2019-10-31 PROCEDURE — 71045 X-RAY EXAM CHEST 1 VIEW: CPT | Mod: 26

## 2019-10-31 RX ADMIN — PANTOPRAZOLE SODIUM 40 MILLIGRAM(S): 20 TABLET, DELAYED RELEASE ORAL at 10:03

## 2019-10-31 RX ADMIN — Medication 100 MILLIGRAM(S): at 21:06

## 2019-10-31 RX ADMIN — GABAPENTIN 300 MILLIGRAM(S): 400 CAPSULE ORAL at 10:03

## 2019-10-31 RX ADMIN — Medication 30 MILLILITER(S): at 10:05

## 2019-10-31 RX ADMIN — METFORMIN HYDROCHLORIDE 500 MILLIGRAM(S): 850 TABLET ORAL at 21:06

## 2019-10-31 RX ADMIN — METFORMIN HYDROCHLORIDE 500 MILLIGRAM(S): 850 TABLET ORAL at 10:03

## 2019-10-31 RX ADMIN — METHOCARBAMOL 500 MILLIGRAM(S): 500 TABLET, FILM COATED ORAL at 00:13

## 2019-10-31 RX ADMIN — METHOCARBAMOL 500 MILLIGRAM(S): 500 TABLET, FILM COATED ORAL at 23:38

## 2019-10-31 RX ADMIN — Medication 5 MILLILITER(S): at 03:11

## 2019-10-31 RX ADMIN — Medication 5 MILLILITER(S): at 17:48

## 2019-10-31 RX ADMIN — Medication 1 PATCH: at 10:07

## 2019-10-31 RX ADMIN — GABAPENTIN 300 MILLIGRAM(S): 400 CAPSULE ORAL at 21:06

## 2019-10-31 RX ADMIN — Medication 50 MILLIGRAM(S): at 21:07

## 2019-10-31 RX ADMIN — Medication 100 MILLIGRAM(S): at 00:12

## 2019-10-31 RX ADMIN — Medication 5 MILLILITER(S): at 10:05

## 2019-10-31 RX ADMIN — Medication 1 TABLET(S): at 10:03

## 2019-10-31 RX ADMIN — BUPRENORPHINE AND NALOXONE 1 TABLET(S): 2; .5 TABLET SUBLINGUAL at 10:06

## 2019-11-01 RX ORDER — BENZOCAINE AND MENTHOL 5; 1 G/100ML; G/100ML
1 LIQUID ORAL EVERY 4 HOURS
Refills: 0 | Status: DISCONTINUED | OUTPATIENT
Start: 2019-11-01 | End: 2019-11-08

## 2019-11-01 RX ADMIN — BUPRENORPHINE AND NALOXONE 1 TABLET(S): 2; .5 TABLET SUBLINGUAL at 08:32

## 2019-11-01 RX ADMIN — METFORMIN HYDROCHLORIDE 500 MILLIGRAM(S): 850 TABLET ORAL at 08:32

## 2019-11-01 RX ADMIN — GABAPENTIN 300 MILLIGRAM(S): 400 CAPSULE ORAL at 08:32

## 2019-11-01 RX ADMIN — Medication 30 MILLILITER(S): at 21:20

## 2019-11-01 RX ADMIN — PANTOPRAZOLE SODIUM 40 MILLIGRAM(S): 20 TABLET, DELAYED RELEASE ORAL at 08:32

## 2019-11-01 RX ADMIN — GABAPENTIN 300 MILLIGRAM(S): 400 CAPSULE ORAL at 21:19

## 2019-11-01 RX ADMIN — METHOCARBAMOL 500 MILLIGRAM(S): 500 TABLET, FILM COATED ORAL at 07:18

## 2019-11-01 RX ADMIN — Medication 1 TABLET(S): at 08:32

## 2019-11-01 RX ADMIN — GABAPENTIN 300 MILLIGRAM(S): 400 CAPSULE ORAL at 14:45

## 2019-11-01 RX ADMIN — METFORMIN HYDROCHLORIDE 500 MILLIGRAM(S): 850 TABLET ORAL at 21:19

## 2019-11-02 RX ADMIN — Medication 1 PATCH: at 08:58

## 2019-11-02 RX ADMIN — Medication 100 MILLIGRAM(S): at 00:10

## 2019-11-02 RX ADMIN — Medication 50 MILLIGRAM(S): at 21:12

## 2019-11-02 RX ADMIN — GABAPENTIN 300 MILLIGRAM(S): 400 CAPSULE ORAL at 12:45

## 2019-11-02 RX ADMIN — METFORMIN HYDROCHLORIDE 500 MILLIGRAM(S): 850 TABLET ORAL at 08:58

## 2019-11-02 RX ADMIN — Medication 1 TABLET(S): at 08:58

## 2019-11-02 RX ADMIN — GABAPENTIN 300 MILLIGRAM(S): 400 CAPSULE ORAL at 21:12

## 2019-11-02 RX ADMIN — METFORMIN HYDROCHLORIDE 500 MILLIGRAM(S): 850 TABLET ORAL at 21:12

## 2019-11-02 RX ADMIN — PANTOPRAZOLE SODIUM 40 MILLIGRAM(S): 20 TABLET, DELAYED RELEASE ORAL at 08:58

## 2019-11-02 RX ADMIN — METHOCARBAMOL 500 MILLIGRAM(S): 500 TABLET, FILM COATED ORAL at 00:10

## 2019-11-02 RX ADMIN — GABAPENTIN 300 MILLIGRAM(S): 400 CAPSULE ORAL at 08:58

## 2019-11-02 RX ADMIN — BUPRENORPHINE AND NALOXONE 1 TABLET(S): 2; .5 TABLET SUBLINGUAL at 08:59

## 2019-11-03 RX ADMIN — GABAPENTIN 300 MILLIGRAM(S): 400 CAPSULE ORAL at 14:40

## 2019-11-03 RX ADMIN — GABAPENTIN 300 MILLIGRAM(S): 400 CAPSULE ORAL at 08:54

## 2019-11-03 RX ADMIN — Medication 100 MILLIGRAM(S): at 23:49

## 2019-11-03 RX ADMIN — METFORMIN HYDROCHLORIDE 500 MILLIGRAM(S): 850 TABLET ORAL at 21:05

## 2019-11-03 RX ADMIN — Medication 100 MILLIGRAM(S): at 01:10

## 2019-11-03 RX ADMIN — METHOCARBAMOL 500 MILLIGRAM(S): 500 TABLET, FILM COATED ORAL at 01:10

## 2019-11-03 RX ADMIN — Medication 1 TABLET(S): at 08:54

## 2019-11-03 RX ADMIN — BUPRENORPHINE AND NALOXONE 1 TABLET(S): 2; .5 TABLET SUBLINGUAL at 08:54

## 2019-11-03 RX ADMIN — PANTOPRAZOLE SODIUM 40 MILLIGRAM(S): 20 TABLET, DELAYED RELEASE ORAL at 08:54

## 2019-11-03 RX ADMIN — METHOCARBAMOL 500 MILLIGRAM(S): 500 TABLET, FILM COATED ORAL at 23:49

## 2019-11-03 RX ADMIN — GABAPENTIN 300 MILLIGRAM(S): 400 CAPSULE ORAL at 21:05

## 2019-11-03 RX ADMIN — Medication 1 PATCH: at 08:56

## 2019-11-03 RX ADMIN — BENZOCAINE AND MENTHOL 1 LOZENGE: 5; 1 LIQUID ORAL at 18:43

## 2019-11-03 RX ADMIN — METFORMIN HYDROCHLORIDE 500 MILLIGRAM(S): 850 TABLET ORAL at 08:54

## 2019-11-04 RX ADMIN — GABAPENTIN 300 MILLIGRAM(S): 400 CAPSULE ORAL at 15:30

## 2019-11-04 RX ADMIN — Medication 1 PATCH: at 10:01

## 2019-11-04 RX ADMIN — METFORMIN HYDROCHLORIDE 500 MILLIGRAM(S): 850 TABLET ORAL at 23:48

## 2019-11-04 RX ADMIN — Medication 1 TABLET(S): at 10:00

## 2019-11-04 RX ADMIN — BUPRENORPHINE AND NALOXONE 1 TABLET(S): 2; .5 TABLET SUBLINGUAL at 10:00

## 2019-11-04 RX ADMIN — Medication 100 MILLIGRAM(S): at 23:48

## 2019-11-04 RX ADMIN — METFORMIN HYDROCHLORIDE 500 MILLIGRAM(S): 850 TABLET ORAL at 10:00

## 2019-11-04 RX ADMIN — PANTOPRAZOLE SODIUM 40 MILLIGRAM(S): 20 TABLET, DELAYED RELEASE ORAL at 10:00

## 2019-11-04 RX ADMIN — Medication 50 MILLIGRAM(S): at 23:45

## 2019-11-04 RX ADMIN — GABAPENTIN 300 MILLIGRAM(S): 400 CAPSULE ORAL at 10:00

## 2019-11-04 RX ADMIN — METHOCARBAMOL 500 MILLIGRAM(S): 500 TABLET, FILM COATED ORAL at 15:30

## 2019-11-04 RX ADMIN — GABAPENTIN 300 MILLIGRAM(S): 400 CAPSULE ORAL at 23:49

## 2019-11-05 RX ORDER — BUPRENORPHINE AND NALOXONE 2; .5 MG/1; MG/1
1 TABLET SUBLINGUAL
Refills: 0 | Status: DISCONTINUED | OUTPATIENT
Start: 2019-11-05 | End: 2019-11-08

## 2019-11-05 RX ADMIN — BUPRENORPHINE AND NALOXONE 1 TABLET(S): 2; .5 TABLET SUBLINGUAL at 08:09

## 2019-11-05 RX ADMIN — PANTOPRAZOLE SODIUM 40 MILLIGRAM(S): 20 TABLET, DELAYED RELEASE ORAL at 08:07

## 2019-11-05 RX ADMIN — Medication 50 MILLIGRAM(S): at 23:39

## 2019-11-05 RX ADMIN — METHOCARBAMOL 500 MILLIGRAM(S): 500 TABLET, FILM COATED ORAL at 00:28

## 2019-11-05 RX ADMIN — METHOCARBAMOL 500 MILLIGRAM(S): 500 TABLET, FILM COATED ORAL at 21:12

## 2019-11-05 RX ADMIN — METFORMIN HYDROCHLORIDE 500 MILLIGRAM(S): 850 TABLET ORAL at 21:11

## 2019-11-05 RX ADMIN — GABAPENTIN 300 MILLIGRAM(S): 400 CAPSULE ORAL at 08:07

## 2019-11-05 RX ADMIN — GABAPENTIN 300 MILLIGRAM(S): 400 CAPSULE ORAL at 11:58

## 2019-11-05 RX ADMIN — Medication 1 PATCH: at 08:09

## 2019-11-05 RX ADMIN — Medication 100 MILLIGRAM(S): at 21:13

## 2019-11-05 RX ADMIN — Medication 1 TABLET(S): at 08:07

## 2019-11-05 RX ADMIN — METHOCARBAMOL 500 MILLIGRAM(S): 500 TABLET, FILM COATED ORAL at 11:56

## 2019-11-05 RX ADMIN — METFORMIN HYDROCHLORIDE 500 MILLIGRAM(S): 850 TABLET ORAL at 08:07

## 2019-11-05 RX ADMIN — GABAPENTIN 300 MILLIGRAM(S): 400 CAPSULE ORAL at 21:10

## 2019-11-06 RX ADMIN — GABAPENTIN 300 MILLIGRAM(S): 400 CAPSULE ORAL at 08:14

## 2019-11-06 RX ADMIN — Medication 1 PATCH: at 08:15

## 2019-11-06 RX ADMIN — Medication 100 MILLIGRAM(S): at 23:39

## 2019-11-06 RX ADMIN — METFORMIN HYDROCHLORIDE 500 MILLIGRAM(S): 850 TABLET ORAL at 08:14

## 2019-11-06 RX ADMIN — Medication 1 PATCH: at 08:16

## 2019-11-06 RX ADMIN — METHOCARBAMOL 500 MILLIGRAM(S): 500 TABLET, FILM COATED ORAL at 20:59

## 2019-11-06 RX ADMIN — Medication 1 TABLET(S): at 08:14

## 2019-11-06 RX ADMIN — PANTOPRAZOLE SODIUM 40 MILLIGRAM(S): 20 TABLET, DELAYED RELEASE ORAL at 08:14

## 2019-11-06 RX ADMIN — BUPRENORPHINE AND NALOXONE 1 TABLET(S): 2; .5 TABLET SUBLINGUAL at 08:14

## 2019-11-06 RX ADMIN — GABAPENTIN 300 MILLIGRAM(S): 400 CAPSULE ORAL at 20:58

## 2019-11-06 RX ADMIN — GABAPENTIN 300 MILLIGRAM(S): 400 CAPSULE ORAL at 14:01

## 2019-11-06 RX ADMIN — Medication 50 MILLIGRAM(S): at 23:40

## 2019-11-07 VITALS
RESPIRATION RATE: 20 BRPM | DIASTOLIC BLOOD PRESSURE: 91 MMHG | HEART RATE: 89 BPM | SYSTOLIC BLOOD PRESSURE: 160 MMHG | TEMPERATURE: 98 F

## 2019-11-07 RX ADMIN — METFORMIN HYDROCHLORIDE 500 MILLIGRAM(S): 850 TABLET ORAL at 20:54

## 2019-11-07 RX ADMIN — Medication 1 TABLET(S): at 09:42

## 2019-11-07 RX ADMIN — GABAPENTIN 300 MILLIGRAM(S): 400 CAPSULE ORAL at 09:42

## 2019-11-07 RX ADMIN — GABAPENTIN 300 MILLIGRAM(S): 400 CAPSULE ORAL at 14:48

## 2019-11-07 RX ADMIN — PANTOPRAZOLE SODIUM 40 MILLIGRAM(S): 20 TABLET, DELAYED RELEASE ORAL at 09:42

## 2019-11-07 RX ADMIN — Medication 50 MILLIGRAM(S): at 23:10

## 2019-11-07 RX ADMIN — Medication 100 MILLIGRAM(S): at 23:11

## 2019-11-07 RX ADMIN — METFORMIN HYDROCHLORIDE 500 MILLIGRAM(S): 850 TABLET ORAL at 09:43

## 2019-11-07 RX ADMIN — GABAPENTIN 300 MILLIGRAM(S): 400 CAPSULE ORAL at 20:54

## 2019-11-07 RX ADMIN — Medication 1 PATCH: at 09:42

## 2019-11-07 RX ADMIN — Medication 1 PATCH: at 09:45

## 2019-11-07 RX ADMIN — METFORMIN HYDROCHLORIDE 500 MILLIGRAM(S): 850 TABLET ORAL at 09:44

## 2019-11-07 RX ADMIN — METHOCARBAMOL 500 MILLIGRAM(S): 500 TABLET, FILM COATED ORAL at 14:48

## 2019-11-07 RX ADMIN — BUPRENORPHINE AND NALOXONE 1 TABLET(S): 2; .5 TABLET SUBLINGUAL at 09:42

## 2019-11-07 RX ADMIN — METHOCARBAMOL 500 MILLIGRAM(S): 500 TABLET, FILM COATED ORAL at 23:10

## 2019-11-08 RX ORDER — GABAPENTIN 400 MG/1
1 CAPSULE ORAL
Qty: 42 | Refills: 0
Start: 2019-11-08 | End: 2019-11-21

## 2019-11-08 RX ORDER — BUPRENORPHINE AND NALOXONE 2; .5 MG/1; MG/1
1 TABLET SUBLINGUAL
Qty: 15 | Refills: 0
Start: 2019-11-08 | End: 2019-11-22

## 2019-11-08 RX ORDER — TRAZODONE HCL 50 MG
1 TABLET ORAL
Qty: 14 | Refills: 0
Start: 2019-11-08 | End: 2019-11-21

## 2019-11-08 RX ORDER — BUPRENORPHINE AND NALOXONE 2; .5 MG/1; MG/1
0 TABLET SUBLINGUAL
Qty: 0 | Refills: 0 | DISCHARGE
Start: 2019-11-08

## 2019-11-08 RX ADMIN — GABAPENTIN 300 MILLIGRAM(S): 400 CAPSULE ORAL at 08:48

## 2019-11-08 RX ADMIN — Medication 1 TABLET(S): at 08:47

## 2019-11-08 RX ADMIN — METFORMIN HYDROCHLORIDE 500 MILLIGRAM(S): 850 TABLET ORAL at 08:47

## 2019-11-08 RX ADMIN — PANTOPRAZOLE SODIUM 40 MILLIGRAM(S): 20 TABLET, DELAYED RELEASE ORAL at 08:47

## 2019-11-08 NOTE — CHART NOTE - NSCHARTNOTEFT_GEN_A_CORE
Allergies:  No Known Allergies      Diet: Regular    Activity: as tolerated    Follow up with    1. PMD in 2 weeks    2. Psych in 2 weeks    3.    Follow up for abnormal labs/tests    1.    Extra Instructions:      Flu Vaccine given  Yes_____         No______      Diagnosis:  Chemical Dependency   Maintain sobriety  refrain from all use      Patient Signature___________________________________________  Date_________________      Nurse Signature_____________________________________________Date_________________
CXR negative. No abx at this time. Please utilize PRN medications for cold symptoms.    CXR 10/31/19:  INTERPRETATION:  Clinical History / Reason for exam: Cough    Comparison : Chest radiograph 10/14/2019.    Technique/Positioning: Frontal view.    Findings:    Support devices: None.    Cardiac/mediastinum/hilum: Unremarkable.    Lung parenchyma/Pleura: Within normal limits.    Skeleton/soft tissues: Stable    Impression:      No radiographic evidence of acute cardiopulmonary disease.                      MAGDALENA RIVERA, ATTENDING RADIOLOGIST  This document has been electronically signed. Oct 31 2019 11:13AM        < end of copied text >
Called in by RN. Pt had his sutures and staples placed on 10/11/19 in the ED after scalp trauma.   Used betadine to clean the area.   Removed 6 staples and 1 suture.   Placed bacitracin.   No other sutures or staples noted.
Pt is a 48yo M c/o SOB and cough since sunday. Pt states that while he was admitted to the [facility] he was dx w/ PNA and started on IV unasyn and transitioned to PO augmentin (As per progress note last dose was to be given on 10/18) - after being transferred to [facility] on 10/17 pt states that his abx were not continued. Pt reports return of SOB, wheezing, and productive cough since [place] 10/27. Pt spiked a temp on 10/24 - and has been afebrile since. Low grade temp of 100.3 on 10/29.     PHYSICAL EXAM:      Constitutional: NAD, A&O x3    Respiratory: +air entry, no rales, no rhonchi, no wheezes    Cardiovascular: +S1 and S2, regular rate and rhythm    Gastrointestinal: +BS, soft, non-tender, not distended    Extremities:  no edema, no calf tenderness      Plan: As per chart it seems patient never received last dose of Augmentin. Will order CXR to r/o PNA, but lung sounds are clear at this time and pt currently afebrile. Will follow up.
Rn notified me of temp: 102 with no source of fever    Tylenol given and responded well     afeb at this time     [facility] staff to call me back if fever returns
Pt with complaint of neuropathy.  pt has been on gabapentin in the past with good effect.  pt doing well on suboxone 8mg.    will add Gabapentin 3xdaily and reevaluate as needed

## 2019-11-12 DIAGNOSIS — F11.20 OPIOID DEPENDENCE, UNCOMPLICATED: ICD-10-CM

## 2019-11-12 DIAGNOSIS — E11.40 TYPE 2 DIABETES MELLITUS WITH DIABETIC NEUROPATHY, UNSPECIFIED: ICD-10-CM

## 2019-11-12 DIAGNOSIS — R05 COUGH: ICD-10-CM

## 2019-11-12 DIAGNOSIS — I10 ESSENTIAL (PRIMARY) HYPERTENSION: ICD-10-CM

## 2019-11-12 DIAGNOSIS — K21.9 GASTRO-ESOPHAGEAL REFLUX DISEASE WITHOUT ESOPHAGITIS: ICD-10-CM

## 2019-11-12 DIAGNOSIS — Z79.84 LONG TERM (CURRENT) USE OF ORAL HYPOGLYCEMIC DRUGS: ICD-10-CM

## 2019-11-12 DIAGNOSIS — Z51.89 ENCOUNTER FOR OTHER SPECIFIED AFTERCARE: ICD-10-CM

## 2019-11-12 DIAGNOSIS — F10.20 ALCOHOL DEPENDENCE, UNCOMPLICATED: ICD-10-CM

## 2019-11-12 DIAGNOSIS — R50.9 FEVER, UNSPECIFIED: ICD-10-CM

## 2021-07-24 ENCOUNTER — INPATIENT (INPATIENT)
Facility: HOSPITAL | Age: 51
LOS: 4 days | Discharge: HOME | End: 2021-07-29
Attending: STUDENT IN AN ORGANIZED HEALTH CARE EDUCATION/TRAINING PROGRAM | Admitting: STUDENT IN AN ORGANIZED HEALTH CARE EDUCATION/TRAINING PROGRAM
Payer: MEDICAID

## 2021-07-24 VITALS
WEIGHT: 265 LBS | SYSTOLIC BLOOD PRESSURE: 196 MMHG | DIASTOLIC BLOOD PRESSURE: 93 MMHG | HEART RATE: 69 BPM | TEMPERATURE: 99 F | HEIGHT: 72 IN | RESPIRATION RATE: 18 BRPM | OXYGEN SATURATION: 97 %

## 2021-07-24 DIAGNOSIS — Z98.890 OTHER SPECIFIED POSTPROCEDURAL STATES: Chronic | ICD-10-CM

## 2021-07-24 LAB
ALBUMIN SERPL ELPH-MCNC: 3.6 G/DL — SIGNIFICANT CHANGE UP (ref 3.5–5.2)
ALP SERPL-CCNC: 107 U/L — SIGNIFICANT CHANGE UP (ref 30–115)
ALT FLD-CCNC: 74 U/L — HIGH (ref 0–41)
ANION GAP SERPL CALC-SCNC: 14 MMOL/L — SIGNIFICANT CHANGE UP (ref 7–14)
APTT BLD: 34.7 SEC — SIGNIFICANT CHANGE UP (ref 27–39.2)
AST SERPL-CCNC: 206 U/L — HIGH (ref 0–41)
BASOPHILS # BLD AUTO: 0 K/UL — SIGNIFICANT CHANGE UP (ref 0–0.2)
BASOPHILS NFR BLD AUTO: 0 % — SIGNIFICANT CHANGE UP (ref 0–1)
BILIRUB SERPL-MCNC: 1.7 MG/DL — HIGH (ref 0.2–1.2)
BUN SERPL-MCNC: 13 MG/DL — SIGNIFICANT CHANGE UP (ref 10–20)
CALCIUM SERPL-MCNC: 8.6 MG/DL — SIGNIFICANT CHANGE UP (ref 8.5–10.1)
CHLORIDE SERPL-SCNC: 97 MMOL/L — LOW (ref 98–110)
CO2 SERPL-SCNC: 26 MMOL/L — SIGNIFICANT CHANGE UP (ref 17–32)
CREAT SERPL-MCNC: 0.6 MG/DL — LOW (ref 0.7–1.5)
EOSINOPHIL # BLD AUTO: 0.03 K/UL — SIGNIFICANT CHANGE UP (ref 0–0.7)
EOSINOPHIL NFR BLD AUTO: 1.7 % — SIGNIFICANT CHANGE UP (ref 0–8)
ETHANOL SERPL-MCNC: 59 MG/DL — HIGH
GLUCOSE BLDC GLUCOMTR-MCNC: 172 MG/DL — HIGH (ref 70–99)
GLUCOSE SERPL-MCNC: 164 MG/DL — HIGH (ref 70–99)
HCT VFR BLD CALC: 41.5 % — LOW (ref 42–52)
HGB BLD-MCNC: 14.2 G/DL — SIGNIFICANT CHANGE UP (ref 14–18)
IMM GRANULOCYTES NFR BLD AUTO: 1.1 % — HIGH (ref 0.1–0.3)
INR BLD: 1.23 RATIO — SIGNIFICANT CHANGE UP (ref 0.65–1.3)
LIDOCAIN IGE QN: 24 U/L — SIGNIFICANT CHANGE UP (ref 7–60)
LYMPHOCYTES # BLD AUTO: 0.61 K/UL — LOW (ref 1.2–3.4)
LYMPHOCYTES # BLD AUTO: 34.1 % — SIGNIFICANT CHANGE UP (ref 20.5–51.1)
MAGNESIUM SERPL-MCNC: 1.5 MG/DL — LOW (ref 1.8–2.4)
MCHC RBC-ENTMCNC: 33 PG — HIGH (ref 27–31)
MCHC RBC-ENTMCNC: 34.2 G/DL — SIGNIFICANT CHANGE UP (ref 32–37)
MCV RBC AUTO: 96.5 FL — HIGH (ref 80–94)
MONOCYTES # BLD AUTO: 0.22 K/UL — SIGNIFICANT CHANGE UP (ref 0.1–0.6)
MONOCYTES NFR BLD AUTO: 12.3 % — HIGH (ref 1.7–9.3)
NEUTROPHILS # BLD AUTO: 0.91 K/UL — LOW (ref 1.4–6.5)
NEUTROPHILS NFR BLD AUTO: 50.8 % — SIGNIFICANT CHANGE UP (ref 42.2–75.2)
NEUTS BAND # BLD: 8 % — HIGH (ref 0–6)
NRBC # BLD: 0 /100 WBCS — SIGNIFICANT CHANGE UP (ref 0–0)
NRBC # BLD: 0 /100 — SIGNIFICANT CHANGE UP (ref 0–0)
PLAT MORPH BLD: ABNORMAL
PLATELET # BLD AUTO: 36 K/UL — LOW (ref 130–400)
PLATELET COUNT - ESTIMATE: ABNORMAL
POTASSIUM SERPL-MCNC: 4 MMOL/L — SIGNIFICANT CHANGE UP (ref 3.5–5)
POTASSIUM SERPL-SCNC: 4 MMOL/L — SIGNIFICANT CHANGE UP (ref 3.5–5)
PROT SERPL-MCNC: 6.2 G/DL — SIGNIFICANT CHANGE UP (ref 6–8)
PROTHROM AB SERPL-ACNC: 14.1 SEC — HIGH (ref 9.95–12.87)
RBC # BLD: 4.3 M/UL — LOW (ref 4.7–6.1)
RBC # FLD: 12.8 % — SIGNIFICANT CHANGE UP (ref 11.5–14.5)
RBC BLD AUTO: NORMAL — SIGNIFICANT CHANGE UP
SARS-COV-2 RNA SPEC QL NAA+PROBE: SIGNIFICANT CHANGE UP
SODIUM SERPL-SCNC: 137 MMOL/L — SIGNIFICANT CHANGE UP (ref 135–146)
TROPONIN T SERPL-MCNC: <0.01 NG/ML — SIGNIFICANT CHANGE UP
TROPONIN T SERPL-MCNC: <0.01 NG/ML — SIGNIFICANT CHANGE UP
WBC # BLD: 1.79 K/UL — LOW (ref 4.8–10.8)
WBC # FLD AUTO: 1.79 K/UL — LOW (ref 4.8–10.8)

## 2021-07-24 PROCEDURE — 71046 X-RAY EXAM CHEST 2 VIEWS: CPT | Mod: 26

## 2021-07-24 PROCEDURE — 76705 ECHO EXAM OF ABDOMEN: CPT | Mod: 26

## 2021-07-24 PROCEDURE — 93010 ELECTROCARDIOGRAM REPORT: CPT

## 2021-07-24 PROCEDURE — 99285 EMERGENCY DEPT VISIT HI MDM: CPT

## 2021-07-24 PROCEDURE — 99223 1ST HOSP IP/OBS HIGH 75: CPT

## 2021-07-24 PROCEDURE — 71275 CT ANGIOGRAPHY CHEST: CPT | Mod: 26,MA

## 2021-07-24 RX ORDER — DEXTROSE 50 % IN WATER 50 %
15 SYRINGE (ML) INTRAVENOUS ONCE
Refills: 0 | Status: DISCONTINUED | OUTPATIENT
Start: 2021-07-24 | End: 2021-07-28

## 2021-07-24 RX ORDER — LISINOPRIL 2.5 MG/1
20 TABLET ORAL ONCE
Refills: 0 | Status: COMPLETED | OUTPATIENT
Start: 2021-07-24 | End: 2021-07-24

## 2021-07-24 RX ORDER — SODIUM CHLORIDE 9 MG/ML
1000 INJECTION, SOLUTION INTRAVENOUS ONCE
Refills: 0 | Status: COMPLETED | OUTPATIENT
Start: 2021-07-24 | End: 2021-07-24

## 2021-07-24 RX ORDER — DIAZEPAM 5 MG
5 TABLET ORAL ONCE
Refills: 0 | Status: DISCONTINUED | OUTPATIENT
Start: 2021-07-24 | End: 2021-07-24

## 2021-07-24 RX ORDER — SODIUM CHLORIDE 9 MG/ML
1000 INJECTION, SOLUTION INTRAVENOUS
Refills: 0 | Status: DISCONTINUED | OUTPATIENT
Start: 2021-07-24 | End: 2021-07-28

## 2021-07-24 RX ORDER — DEXTROSE 50 % IN WATER 50 %
25 SYRINGE (ML) INTRAVENOUS ONCE
Refills: 0 | Status: DISCONTINUED | OUTPATIENT
Start: 2021-07-24 | End: 2021-07-28

## 2021-07-24 RX ORDER — DEXTROSE 50 % IN WATER 50 %
12.5 SYRINGE (ML) INTRAVENOUS ONCE
Refills: 0 | Status: DISCONTINUED | OUTPATIENT
Start: 2021-07-24 | End: 2021-07-28

## 2021-07-24 RX ORDER — DIPHENHYDRAMINE HCL 50 MG
25 CAPSULE ORAL ONCE
Refills: 0 | Status: COMPLETED | OUTPATIENT
Start: 2021-07-24 | End: 2021-07-24

## 2021-07-24 RX ORDER — GABAPENTIN 400 MG/1
300 CAPSULE ORAL THREE TIMES A DAY
Refills: 0 | Status: DISCONTINUED | OUTPATIENT
Start: 2021-07-24 | End: 2021-07-29

## 2021-07-24 RX ORDER — MAGNESIUM SULFATE 500 MG/ML
2 VIAL (ML) INJECTION ONCE
Refills: 0 | Status: COMPLETED | OUTPATIENT
Start: 2021-07-24 | End: 2021-07-24

## 2021-07-24 RX ORDER — FOLIC ACID 0.8 MG
1 TABLET ORAL DAILY
Refills: 0 | Status: DISCONTINUED | OUTPATIENT
Start: 2021-07-24 | End: 2021-07-29

## 2021-07-24 RX ORDER — THIAMINE MONONITRATE (VIT B1) 100 MG
100 TABLET ORAL ONCE
Refills: 0 | Status: COMPLETED | OUTPATIENT
Start: 2021-07-24 | End: 2021-07-24

## 2021-07-24 RX ORDER — THIAMINE MONONITRATE (VIT B1) 100 MG
100 TABLET ORAL DAILY
Refills: 0 | Status: DISCONTINUED | OUTPATIENT
Start: 2021-07-24 | End: 2021-07-29

## 2021-07-24 RX ORDER — ONDANSETRON 8 MG/1
4 TABLET, FILM COATED ORAL
Refills: 0 | Status: DISCONTINUED | OUTPATIENT
Start: 2021-07-24 | End: 2021-07-29

## 2021-07-24 RX ORDER — GLUCAGON INJECTION, SOLUTION 0.5 MG/.1ML
1 INJECTION, SOLUTION SUBCUTANEOUS ONCE
Refills: 0 | Status: DISCONTINUED | OUTPATIENT
Start: 2021-07-24 | End: 2021-07-28

## 2021-07-24 RX ORDER — BUPRENORPHINE AND NALOXONE 2; .5 MG/1; MG/1
1 TABLET SUBLINGUAL
Refills: 0 | Status: DISCONTINUED | OUTPATIENT
Start: 2021-07-24 | End: 2021-07-29

## 2021-07-24 RX ORDER — ENOXAPARIN SODIUM 100 MG/ML
40 INJECTION SUBCUTANEOUS DAILY
Refills: 0 | Status: DISCONTINUED | OUTPATIENT
Start: 2021-07-24 | End: 2021-07-24

## 2021-07-24 RX ORDER — INSULIN LISPRO 100/ML
VIAL (ML) SUBCUTANEOUS
Refills: 0 | Status: DISCONTINUED | OUTPATIENT
Start: 2021-07-24 | End: 2021-07-29

## 2021-07-24 RX ORDER — LISINOPRIL 2.5 MG/1
20 TABLET ORAL DAILY
Refills: 0 | Status: DISCONTINUED | OUTPATIENT
Start: 2021-07-24 | End: 2021-07-27

## 2021-07-24 RX ADMIN — Medication 50 MILLIGRAM(S): at 21:30

## 2021-07-24 RX ADMIN — SODIUM CHLORIDE 1000 MILLILITER(S): 9 INJECTION, SOLUTION INTRAVENOUS at 12:55

## 2021-07-24 RX ADMIN — Medication 25 MILLIGRAM(S): at 23:00

## 2021-07-24 RX ADMIN — Medication 5 MILLIGRAM(S): at 16:05

## 2021-07-24 RX ADMIN — Medication 50 MILLIGRAM(S): at 17:45

## 2021-07-24 RX ADMIN — Medication 50 GRAM(S): at 14:18

## 2021-07-24 RX ADMIN — LISINOPRIL 20 MILLIGRAM(S): 2.5 TABLET ORAL at 21:30

## 2021-07-24 RX ADMIN — GABAPENTIN 300 MILLIGRAM(S): 400 CAPSULE ORAL at 21:30

## 2021-07-24 RX ADMIN — BUPRENORPHINE AND NALOXONE 1 TABLET(S): 2; .5 TABLET SUBLINGUAL at 17:45

## 2021-07-24 RX ADMIN — Medication 100 MILLIGRAM(S): at 12:55

## 2021-07-24 NOTE — ED PROVIDER NOTE - CARE PLAN
Principal Discharge DX:	Chest pain  Secondary Diagnosis:	DM (diabetes mellitus)  Secondary Diagnosis:	Neutropenia  Secondary Diagnosis:	Thrombocytopenia  Secondary Diagnosis:	Alcohol abuse  Secondary Diagnosis:	Alcohol withdrawal  Secondary Diagnosis:	Hypomagnesemia   Principal Discharge DX:	Chest pain  Secondary Diagnosis:	DM (diabetes mellitus)  Secondary Diagnosis:	Thrombocytopenia  Secondary Diagnosis:	Alcohol abuse  Secondary Diagnosis:	Alcohol withdrawal  Secondary Diagnosis:	Lymphopenia

## 2021-07-24 NOTE — PATIENT PROFILE ADULT - NSPROEXTENSIONSOFSELF_GEN_A_NUR
[facility]   Statement to Patients  April 1, 2014      In an effort to ensure the large volume of patient prescription refills is processed in the most efficient and expeditious manner, we are asking our patients to assist us by calling your Pharmacy for all prescription refills, this will include also your  Mail Order Pharmacy. The pharmacy will contact our office electronically to continue the refill process. Please do not wait until the last minute to call your pharmacy. We need at least 48 hours (2days) to fill prescriptions. We also encourage you to call your pharmacy before going to  your prescription to make sure it is ready. With regard to controlled substance prescription refill requests (narcotic refills) that need to be picked up at our office, we ask your cooperation by providing us with at least 72 hours (3days) notice that you will need a refill. We will not refill narcotic prescription refill requests after 4:00pm on any weekday, Monday through Thursday, or after 2:00pm on Fridays, or on the weekends. We encourage everyone to explore another way of getting your prescription refill request processed using Minbox, our patient web portal through our electronic medical record system. Minbox is an efficient and effective way to communicate your medication request directly to the office and  downloadable as an kevin on your smart phone . Minbox also features a review functionality that allows you to view your medication list as well as leave messages for your physician. Are you ready to get connected? If so please review the attatched instructions or speak to any of our staff to get you set up right away! Thank you so much for your cooperation. Should you have any questions please contact our Practice Administrator. The Physicians and Staff,  [facility] [id number] [address]  What is a living will?   A living will is a legal form you use to write down the kind of care you want at the end of your life. It is used by the health professionals who will treat you if you aren't able to decide for yourself. If you put your wishes in writing, your loved ones and others will know what kind of care you want. They won't need to guess. This can ease your mind and be helpful to others. A living will is not the same as an estate or property will. An estate will explains what you want to happen with your money and property after you die. Is a living will a legal document? A living will is a legal document. Each state has its own laws about living luther. If you move to another state, make sure that your living will is legal in the state where you now live. Or you might use a universal form that has been approved by many states. This kind of form can sometimes be completed and stored online. Your electronic copy will then be available wherever you have a connection to the Internet. In most cases, doctors will respect your wishes even if you have a form from a different state. · You don't need an  to complete a living will. But legal advice can be helpful if your state's laws are unclear, your health history is complicated, or your family can't agree on what should be in your living will. · You can change your living will at any time. Some people find that their wishes about end-of-life care change as their health changes. · In addition to making a living will, think about completing a medical power of  form. This form lets you name the person you want to make end-of-life treatment decisions for you (your \"health care agent\") if you're not able to. Many hospitals and nursing homes will give you the forms you need to complete a living will and a medical power of . · Your living will is used only if you can't make or communicate decisions for yourself anymore.  If you become able to make decisions again, you can accept or refuse any treatment, no matter what you wrote in your living will. · Your state may offer an online registry. This is a place where you can store your living will online so the doctors and nurses who need to treat you can find it right away. What should you think about when creating a living will? Talk about your end-of-life wishes with your family members and your doctor. Let them know what you want. That way the people making decisions for you won't be surprised by your choices. Think about these questions as you make your living will:  · Do you know enough about life support methods that might be used? If not, talk to your doctor so you know what might be done if you can't breathe on your own, your heart stops, or you're unable to swallow. · What things would you still want to be able to do after you receive life-support methods? Would you want to be able to walk? To speak? To eat on your own? To live without the help of machines? · If you have a choice, where do you want to be cared for? In your home? At a hospital or nursing home? · Do you want certain Islam practices performed if you become very ill? · If you have a choice at the end of your life, where would you prefer to die? At home? In a hospital or nursing home? Somewhere else? · Would you prefer to be buried or cremated? · Do you want your organs to be donated after you die? What should you do with your living will? · Make sure that your family members and your health care agent have copies of your living will. · Give your doctor a copy of your living will to keep in your medical record. If you have more than one doctor, make sure that each one has a copy. · You may want to put a copy of your living will where it can be easily found. Where can you learn more? Go to http://olu-vick.info/. Enter E261 in the search box to learn more about \"Learning About Living Robert Minayaherminiorichardson. \"  Current as of: February 24, 2016  Content Version: 11.2  © 8670-1297 [employer]. Care instructions adapted under license by [employer] (which disclaims liability or warranty for this information). If you have questions about a medical condition or this instruction, always ask your healthcare professional. [facility] any warranty or liability for your use of this information. Advance Directives: Care Instructions  Your Care Instructions  An advance directive is a legal way to state your wishes at the end of your life. It tells your family and your doctor what to do if you can no longer say what you want. There are two main types of advance directives. You can change them any time that your wishes change. · A living will tells your family and your doctor your wishes about life support and other treatment. · A durable power of  for health care lets you name a person to make treatment decisions for you when you can't speak for yourself. This person is called a health care agent. If you do not have an advance directive, decisions about your medical care may be made by a doctor or a  who doesn't know you. It may help to think of an advance directive as a gift to the people who care for you. If you have one, they won't have to make tough decisions by themselves. Follow-up care is a key part of your treatment and safety. Be sure to make and go to all appointments, and call your doctor if you are having problems. It's also a good idea to know your test results and keep a list of the medicines you take. How can you care for yourself at home? · Discuss your wishes with your loved ones and your doctor. This way, there are no surprises. · Many states have a unique form. Or you might use a universal form that has been approved by many states. This kind of form can sometimes be completed and stored online.  Your electronic copy will then be available wherever you have a connection to the Internet. In most cases, doctors will respect your wishes even if you have a form from a different state. · You don't need a  to do an advance directive. But you may want to get legal advice. · Think about these questions when you prepare an advance directive:  ¨ Who do you want to make decisions about your medical care if you are not able to? Many people choose a family member or close friend. ¨ Do you know enough about life support methods that might be used? If not, talk to your doctor so you understand. ¨ What are you most afraid of that might happen? You might be afraid of having pain, losing your independence, or being kept alive by machines. ¨ Where would you prefer to die? Choices include your home, a hospital, or a nursing home. ¨ Would you like to have information about hospice care to support you and your family? ¨ Do you want to donate organs when you die? ¨ Do you want certain Zoroastrian practices performed before you die? If so, put your wishes in the advance directive. · Read your advance directive every year, and make changes as needed. When should you call for help? Be sure to contact your doctor if you have any questions. Where can you learn more? Go to http://olu-vick.info/. Enter R264 in the search box to learn more about \"Advance Directives: Care Instructions. \"  Current as of: November 17, 2016  Content Version: 11.2  © 7725-3713 [employer]. Care instructions adapted under license by [employer] (which disclaims liability or warranty for this information). If you have questions about a medical condition or this instruction, always ask your healthcare professional. [address] any warranty or liability for your use of this information. none

## 2021-07-24 NOTE — ED PROVIDER NOTE - BIRTH SEX
Patient arrives via EMS in restraints and on a transport hold for c/o mental health crisis. Per EMS report pt's brother called 911 saying that patient was having a mental health crisis. Upon police and EMS arrival 2 shots were fired in police direction. Pt arrives in police custody. ABCs intact without intervention at this time.    Male

## 2021-07-24 NOTE — PATIENT PROFILE ADULT - NSTOBACCOCESSATIONEDU6_GEN_A_NUR
"Andrew Lockwood is a 52 year old male who comes in for    CC: Anxiety  HPI:  Anxiety--moved from his apartment Nov. 5th, and feeling more anxious since then. now that he is no longer with his roommates; was homeless, now living in a [facility] ([facility]--rule that he cannot have overnight guests). Has trouble relaxing, feels restless, worries a lot. He took his brother's Xanax pill last week, and this helped him relax. Is wondering if he can try that again. Denies SI or thoughts of self-harm. \"I want to live.\" Denies drug or alcohol use. He stopped using Tramadol for sleep because of GI side effects.    Other issues discussed today:     Patient Active Problem List   Diagnosis     Allergic rhinitis due to other allergen     Brain lesion     Toxoplasma encephalitis (H)     Pulmonary nodules     Human immunodeficiency virus (HIV) disease (H)     Folliculitis     Prurigo nodularis     Epidermal cyst     Shoulder joint pain, unspecified laterality     CNS toxoplasmosis (H)     Constipation     Non-intractable vomiting with nausea, vomiting of unspecified type     Thrush     Insomnia, unspecified insomnia     Bowel obstruction (H)     Toxoplasmosis     Gastroesophageal reflux disease     Headache     Aphthous ulcer     Type 2 diabetes mellitus (H)     Small bowel obstruction (H)     SBO (small bowel obstruction) (H)     Slow transit constipation     Periungual wart     Herpes zoster     Erectile dysfunction, unspecified erectile dysfunction type     Insomnia, unspecified type     Preventative health care       Current Outpatient Prescriptions   Medication Sig Dispense Refill     escitalopram (LEXAPRO) 10 MG tablet Take 1 tablet (10 mg) by mouth daily 30 tablet 1     metFORMIN (GLUCOPHAGE) 500 MG tablet TAKE TWO TABLETS BY MOUTH TWICE A DAY WITH MEALS 120 tablet 3     metoclopramide (REGLAN) 10 MG tablet Take 1 tablet (10 mg) by mouth 4 times daily (before meals and nightly) 120 tablet 2     nicotine (NICODERM CQ) 21 " MG/24HR 24 hr patch Place 1 patch onto the skin every 24 hours 30 patch 0     omeprazole (PRILOSEC) 20 MG CR capsule Take 2 capsules (40 mg) by mouth daily 60 capsule 0     traMADol (ULTRAM) 50 MG tablet Take 1 tablet (50 mg) by mouth every 6 hours as needed for pain 12 tablet 0     sucralfate (CARAFATE) 1 GM tablet Take 1 tablet (1 g) by mouth 4 times daily 40 tablet 1     Calcium Carbonate-Simethicone (ROLAIDS PLUS GAS RELIEF EXT ST) 1177-80 MG CHEW Chew 2 tabs tid prn abdomen pain or bloat 84 tablet 1     tadalafil (CIALIS) 20 MG tablet Take 1 tablet (20 mg) by mouth daily as needed for erectile dysfunction 30 tablet 0     GENVOYA [phone] MG PO TABS TAKE ONE TABLET BY MOUTH EVERY DAY (Patient taking differently: TAKE ONE TABLET BY MOUTH EVERY DAY (at bedtime)) 30 tablet 11     simethicone (GAS RELIEF) 80 MG chewable tablet Take 1 tablet (80 mg) by mouth every 6 hours as needed for flatulence or cramping 30 tablet 0     ranitidine (ZANTAC) 150 MG tablet Take 1 tablet (150 mg) by mouth 2 times daily as needed for heartburn 60 tablet 1     [DISCONTINUED] METFORMIN HCL PO Take 1,000 mg by mouth 2 times daily (with meals)       AZITHROMYCIN PO Take 600 mg by mouth once a week       blood glucose (NO BRAND SPECIFIED) lancets standard Use to test blood sugar four times daily or as directed. 100 Box 5     blood glucose monitoring (NO BRAND SPECIFIED) meter device kit Use to test blood sugar four times daily or as directed. 1 kit 0     blood glucose monitoring (NO BRAND SPECIFIED) test strip Use to test blood sugars four times daily or as directed 100 each 5     sulfamethoxazole-trimethoprim (BACTRIM DS,SEPTRA DS) 800-160 MG per tablet Take 1 tablet by mouth 2 times daily           ALLERGIES: Milk and Tylenol    PAST MEDICAL HX:   Past Medical History   Diagnosis Date     Allergic rhinitis due to other allergen      DNS     Diabetes type 2, controlled (H)      HIV (human immunodeficiency virus infection) (H)       AIDS (H)      CNS toxoplasmosis (H)      Sleep apnea      doesn't use CPAP     GERD (gastroesophageal reflux disease)      Chronic abdominal pain      Periungual wart        PAST SURGICAL HX:   Past Surgical History   Procedure Laterality Date     C nonspecific procedure       right forearm fracture     Hc explore undesc testis,inguin/scrotal       Optical tracking system craniotomy, excise tumor, combined Left 4/10/2015     Procedure: COMBINED OPTICAL TRACKING SYSTEM CRANIOTOMY, EXCISE TUMOR;  Surgeon: Mirlande Colmenares MD;  Location: [facility] OR     Colonoscopy Left 1/22/2016     Procedure: COMBINED COLONOSCOPY, SINGLE OR MULTIPLE BIOPSY/POLYPECTOMY BY BIOPSY;  Surgeon: Clark Saini MD;  Location: [facility] GI     Laparoscopy diagnostic (general) N/A 7/26/2016     Procedure: LAPAROSCOPY DIAGNOSTIC (GENERAL);  Surgeon: Susannah Arriaga MD;  Location: [facility] OR     Repair ligament ulnar collateral thumb (gamekeeper's) Right 12/2/2016     Procedure: REPAIR LIGAMENT ULNAR COLLATERAL THUMB (GAMEKEEPER'S);  Surgeon: Evin Zamorano MD;  Location: [facility] OR       IMMUNIZATION HX:   Immunization History   Administered Date(s) Administered     Hepatitis B 03/10/2016, 06/16/2016     Influenza (IIV3) 10/17/2016     Influenza Vaccine IM 3yrs+ 4 Valent IIV4 12/22/2015     Mantoux 04/12/2015     Meningococcal (Menactra ) 08/31/2015, 03/10/2016     Pneumococcal (PCV 13) 03/10/2016     Pneumococcal 23 valent 06/16/2016     TDAP (BOOSTRIX AGES 10-64) 10/17/2016       SOCIAL HX:   Social History     Social History Narrative    Born in [facility].  Came to the [place] in 1993.  Last traveled to visit family in 2008.            ROS:   5- point ROS reviewed and negative except otherwise noted in HPI, including constitutional, CV, Resp, GI, MSK.    OBJECTIVE:  /87 mmHg  Pulse 99  Wt 79.379 kg (175 lb)   Wt Readings from Last 1 Encounters:   01/23/17 79.379 kg (175 lb)     Constitutional: no distress, comfortable, pleasant,  well-groomed  Cardiovascular: regular rate and rhythm, normal S1 and S2, no murmurs, rubs or gallops  Respiratory: clear to auscultation with good air movement bilaterally, no wheezes or crackles, non-labored  Psychological: appropriate mood, demonstrates intact judgment and logical thought process  DAVID-7 SCORE 1/23/2017   Total Score 20         ASSESSMENT/PLAN:    1. DAVID (generalized anxiety disorder)  Will start Lexapro at 10 mg daily--no known interactions with current medication regimen.  Discussed avoiding benzos, as these do not control overall anxiety level and can be addictive. Reviewed importance of maintaining good social support. Recommended pt call and schedule therapy appointment--[facility] referral is still open, numbers provided for scheduling. Crisis numbers reviewed.  - escitalopram (LEXAPRO) 10 MG tablet; Take 1 tablet (10 mg) by mouth daily  Dispense: 30 tablet; Refill: 1    2. Type 2 diabetes mellitus without complication, without long-term current use of insulin (H)  Routine screening. A1C 6.4; Pt taking Metformin 1000 mg BID, continue with current dose.  - HEMOGLOBIN A1C -(Today); Future    FOLLOW UP: in 1 month(s), or sooner as needed.    VIKASH Palomo CNP       Use the 5 A's (Ask, Advise, Assess, Assist, Arrange)

## 2021-07-24 NOTE — H&P ADULT - ASSESSMENT
52yo M  pmhx HTN, DM2, Alcohol/opiate abuse presents to ED for chest pain, nausea/vomiting for past week.    #Chest pain, nausea/vomiting  Possible due to alcohol gastritis, r/o gallstones  r/o ACS, unlikely  CT Chest PE protocol (07/24): No central pulm embolus; hepatic steatosis)  - f/u RUQ ultrasound  - Zofran PRN  - Trend troponins  - Echocardiogram to r/o cardiomyopathy  - Will get GI eval if pt continues to have nausea/vomiting tomorrow    #Alcohol withdrawal  #Hx of substance abuse  #Suspected thiamine deficiency  #Suspected folate deficiency  - Monitor CIWA  - Librium taper protocol  - Ativan 2mg Iv q2h PRN for severe symptoms  - Suboxone 8-2mg BID (ISTOP Reference #: [id number]). Pt states he attends program across the street from this hospital. Unable to reach anyone there as it was closed.  - CATCH team, addiction med consult requested  - Cont thiamine and folic acid    #Pancytopenia  Likely related to alcohol use  - Cont to monitor  - Hold DVT PPX for plt <50    #DM2  Takes metforming at home  - ISS while inpatient  - Will start basal-bolus insulin if FS are persistently elevated    DVT PPX, held due to plt <50    #Progress Note Handoff  Pending (specify): CIWA protocol, ACS rule out  Family discussion: d/w pt regarding treating for alcohol withdrawal  Disposition: Home

## 2021-07-24 NOTE — ED PROVIDER NOTE - OBJECTIVE STATEMENT
patient c/o chest pain for 10 days, No SOB, pain constant, no radiating across right and left chest, no N/V. Admits to etoh abuse, last drink last night

## 2021-07-24 NOTE — ED PROVIDER NOTE - CLINICAL SUMMARY MEDICAL DECISION MAKING FREE TEXT BOX
51y male smoker diabetic alcoholic with CP, early withdrawal without hallucinosis for admission monitored setting

## 2021-07-24 NOTE — ED ADULT TRIAGE NOTE - CHIEF COMPLAINT QUOTE
Patient complaining of sharp right sided chest pain that started one week ago. Patient also complaining of difficulty breathing

## 2021-07-24 NOTE — H&P ADULT - HISTORY OF PRESENT ILLNESS
52yo M  pmhx HTN, DM2, Alcohol/opiate abuse presents to ED for chest pain, nausea/vomiting for past week. Patient endorses having episodes of dry-heaving and vomiting several times a day since last week. He believes he pulled his muscle which may be related to the chest pain he is feeling. Endorses drinking a pint of liquor and several beers a day. Chest pain not worse with rest. No abdominal pain, diarrhea. Endorses some dyspnea symptoms. No fevers/chills at home. 50yo M  pmhx HTN, DM2, Alcohol/opiate abuse presents to ED for chest pain, nausea/vomiting for past week. Patient endorses having episodes of dry-heaving and vomiting several times a day since last week. He believes he pulled his muscle from vomiting which may be related to the chest pain he is feeling. Endorses drinking a pint of liquor and several beers a day. Chest pain not worse with rest. No abdominal pain, diarrhea. Endorses some dyspnea symptoms. No fevers/chills at home. 50yo M  pmhx HTN, DM2, Alcohol/opiate abuse presents to ED for chest pain, nausea/vomiting for past week. Patient endorses feeling nauseous and vomiting several times a day since last week. He believes he pulled his muscle from vomiting which may be related to the chest pain he is feeling. Endorses drinking a pint of liquor and several beers a day. Chest pain not worse with rest. No abdominal pain, diarrhea. Endorses some dyspnea symptoms. No fevers/chills at home. Had admission back in 2019 for alcohol withdrawal.

## 2021-07-24 NOTE — ED PROVIDER NOTE - SECONDARY DIAGNOSIS.
Alcohol abuse Hypomagnesemia Neutropenia Alcohol withdrawal DM (diabetes mellitus) Thrombocytopenia Lymphopenia

## 2021-07-25 LAB
ALBUMIN SERPL ELPH-MCNC: 3.7 G/DL — SIGNIFICANT CHANGE UP (ref 3.5–5.2)
ALP SERPL-CCNC: 112 U/L — SIGNIFICANT CHANGE UP (ref 30–115)
ALT FLD-CCNC: 71 U/L — HIGH (ref 0–41)
ANION GAP SERPL CALC-SCNC: 12 MMOL/L — SIGNIFICANT CHANGE UP (ref 7–14)
APTT BLD: 32.7 SEC — SIGNIFICANT CHANGE UP (ref 27–39.2)
AST SERPL-CCNC: 163 U/L — HIGH (ref 0–41)
BILIRUB SERPL-MCNC: 2.3 MG/DL — HIGH (ref 0.2–1.2)
BUN SERPL-MCNC: 13 MG/DL — SIGNIFICANT CHANGE UP (ref 10–20)
CALCIUM SERPL-MCNC: 8.7 MG/DL — SIGNIFICANT CHANGE UP (ref 8.5–10.1)
CHLORIDE SERPL-SCNC: 93 MMOL/L — LOW (ref 98–110)
CO2 SERPL-SCNC: 29 MMOL/L — SIGNIFICANT CHANGE UP (ref 17–32)
COVID-19 SPIKE DOMAIN AB INTERP: POSITIVE
COVID-19 SPIKE DOMAIN ANTIBODY RESULT: >250 U/ML — HIGH
CREAT SERPL-MCNC: 0.6 MG/DL — LOW (ref 0.7–1.5)
GLUCOSE BLDC GLUCOMTR-MCNC: 155 MG/DL — HIGH (ref 70–99)
GLUCOSE BLDC GLUCOMTR-MCNC: 166 MG/DL — HIGH (ref 70–99)
GLUCOSE BLDC GLUCOMTR-MCNC: 205 MG/DL — HIGH (ref 70–99)
GLUCOSE BLDC GLUCOMTR-MCNC: 307 MG/DL — HIGH (ref 70–99)
GLUCOSE SERPL-MCNC: 183 MG/DL — HIGH (ref 70–99)
HCT VFR BLD CALC: 43.2 % — SIGNIFICANT CHANGE UP (ref 42–52)
HGB BLD-MCNC: 14.6 G/DL — SIGNIFICANT CHANGE UP (ref 14–18)
INR BLD: 1.3 RATIO — SIGNIFICANT CHANGE UP (ref 0.65–1.3)
MCHC RBC-ENTMCNC: 33 PG — HIGH (ref 27–31)
MCHC RBC-ENTMCNC: 33.8 G/DL — SIGNIFICANT CHANGE UP (ref 32–37)
MCV RBC AUTO: 97.7 FL — HIGH (ref 80–94)
NRBC # BLD: 0 /100 WBCS — SIGNIFICANT CHANGE UP (ref 0–0)
PLATELET # BLD AUTO: 34 K/UL — LOW (ref 130–400)
POTASSIUM SERPL-MCNC: 4.2 MMOL/L — SIGNIFICANT CHANGE UP (ref 3.5–5)
POTASSIUM SERPL-SCNC: 4.2 MMOL/L — SIGNIFICANT CHANGE UP (ref 3.5–5)
PROT SERPL-MCNC: 6.5 G/DL — SIGNIFICANT CHANGE UP (ref 6–8)
PROTHROM AB SERPL-ACNC: 14.9 SEC — HIGH (ref 9.95–12.87)
RBC # BLD: 4.42 M/UL — LOW (ref 4.7–6.1)
RBC # FLD: 12.6 % — SIGNIFICANT CHANGE UP (ref 11.5–14.5)
SARS-COV-2 IGG+IGM SERPL QL IA: >250 U/ML — HIGH
SARS-COV-2 IGG+IGM SERPL QL IA: POSITIVE
SODIUM SERPL-SCNC: 134 MMOL/L — LOW (ref 135–146)
TROPONIN T SERPL-MCNC: <0.01 NG/ML — SIGNIFICANT CHANGE UP
WBC # BLD: 2.2 K/UL — LOW (ref 4.8–10.8)
WBC # FLD AUTO: 2.2 K/UL — LOW (ref 4.8–10.8)

## 2021-07-25 PROCEDURE — 99233 SBSQ HOSP IP/OBS HIGH 50: CPT

## 2021-07-25 RX ORDER — SUCRALFATE 1 G
1 TABLET ORAL
Refills: 0 | Status: DISCONTINUED | OUTPATIENT
Start: 2021-07-25 | End: 2021-07-29

## 2021-07-25 RX ORDER — DIPHENHYDRAMINE HCL 50 MG
25 CAPSULE ORAL DAILY
Refills: 0 | Status: COMPLETED | OUTPATIENT
Start: 2021-07-25 | End: 2021-07-25

## 2021-07-25 RX ORDER — AMLODIPINE BESYLATE 2.5 MG/1
10 TABLET ORAL ONCE
Refills: 0 | Status: COMPLETED | OUTPATIENT
Start: 2021-07-25 | End: 2021-07-25

## 2021-07-25 RX ORDER — BACLOFEN 100 %
10 POWDER (GRAM) MISCELLANEOUS THREE TIMES A DAY
Refills: 0 | Status: DISCONTINUED | OUTPATIENT
Start: 2021-07-25 | End: 2021-07-29

## 2021-07-25 RX ADMIN — GABAPENTIN 300 MILLIGRAM(S): 400 CAPSULE ORAL at 15:58

## 2021-07-25 RX ADMIN — GABAPENTIN 300 MILLIGRAM(S): 400 CAPSULE ORAL at 21:39

## 2021-07-25 RX ADMIN — Medication 50 MILLIGRAM(S): at 05:17

## 2021-07-25 RX ADMIN — BUPRENORPHINE AND NALOXONE 1 TABLET(S): 2; .5 TABLET SUBLINGUAL at 17:31

## 2021-07-25 RX ADMIN — Medication 25 MILLIGRAM(S): at 22:52

## 2021-07-25 RX ADMIN — Medication 50 MILLIGRAM(S): at 02:27

## 2021-07-25 RX ADMIN — Medication 50 MILLIGRAM(S): at 11:23

## 2021-07-25 RX ADMIN — Medication 25 MILLIGRAM(S): at 17:30

## 2021-07-25 RX ADMIN — BUPRENORPHINE AND NALOXONE 1 TABLET(S): 2; .5 TABLET SUBLINGUAL at 05:17

## 2021-07-25 RX ADMIN — Medication 25 MILLIGRAM(S): at 21:39

## 2021-07-25 RX ADMIN — LISINOPRIL 20 MILLIGRAM(S): 2.5 TABLET ORAL at 05:18

## 2021-07-25 RX ADMIN — AMLODIPINE BESYLATE 10 MILLIGRAM(S): 2.5 TABLET ORAL at 17:33

## 2021-07-25 RX ADMIN — Medication 8: at 11:29

## 2021-07-25 RX ADMIN — GABAPENTIN 300 MILLIGRAM(S): 400 CAPSULE ORAL at 05:17

## 2021-07-25 RX ADMIN — Medication 30 MILLILITER(S): at 17:31

## 2021-07-25 RX ADMIN — Medication 1 MILLIGRAM(S): at 12:35

## 2021-07-25 RX ADMIN — Medication 10 MILLIGRAM(S): at 15:58

## 2021-07-25 RX ADMIN — Medication 1 GRAM(S): at 17:32

## 2021-07-25 RX ADMIN — Medication 1 GRAM(S): at 12:35

## 2021-07-25 RX ADMIN — Medication 10 MILLIGRAM(S): at 21:39

## 2021-07-25 RX ADMIN — Medication 100 MILLIGRAM(S): at 12:35

## 2021-07-25 NOTE — PROGRESS NOTE ADULT - ASSESSMENT
52yo M  pmhx HTN, DM2, Alcohol/opiate abuse presents to ED for chest pain, nausea/vomiting for past week.    #Chest pain, nausea/vomiting  Possible due to alcohol gastritis, r/o gallstones  ACS ruled out, 3 negative troponins  CT Chest PE protocol (07/24): No central pulm embolus; hepatic steatosis)  - f/u RUQ ultrasound  - Zofran PRN  - Echocardiogram to r/o cardiomyopathy  - GI Eval requested    #Alcohol withdrawal  #Hx of substance abuse  #Suspected thiamine deficiency  #Suspected folate deficiency  - Monitor CIWA  - Librium taper protocol  - Ativan 2mg Iv q2h PRN for severe symptoms  - Suboxone 8-2mg BID (ISTOP Reference #: [id number]). Pt states he attends program across the street from this hospital.   - CATCH team, addiction med consult requested  - Cont thiamine and folic acid    #Pancytopenia  Likely related to alcohol use  - Cont to monitor  - Hold DVT PPX for plt <50    #DM2  Takes metforming at home  - ISS while inpatient  - Will start basal-bolus insulin if FS are persistently elevated    #HTN  - Cont lisinopril 20mg qD  - Cont amlodipine 10mg qD    DVT PPX, held due to plt <50    #Progress Note Handoff  Pending (specify): CIWA protocol, RUQ US, Echo  Family discussion: d/w pt regarding treating for alcohol withdrawal  Disposition: Home

## 2021-07-25 NOTE — CONSULT NOTE ADULT - ASSESSMENT
Nausea/Vomiting, Hematemesis resolved  Elevated LFTs secondary to ETOH  thrombocytopenia    Hepatomeagly  Mild ascites   MDF 11/2    PLAN  2 gm Na diet  Continue pantoprazole  Clear liquid diet advance as tolerated  Continue detox protocol  EGD before discharge  Advised to STOP ETOH  Will follow

## 2021-07-26 ENCOUNTER — TRANSCRIPTION ENCOUNTER (OUTPATIENT)
Age: 51
End: 2021-07-26

## 2021-07-26 DIAGNOSIS — F11.20 OPIOID DEPENDENCE, UNCOMPLICATED: ICD-10-CM

## 2021-07-26 DIAGNOSIS — F10.230 ALCOHOL DEPENDENCE WITH WITHDRAWAL, UNCOMPLICATED: ICD-10-CM

## 2021-07-26 LAB
A1C WITH ESTIMATED AVERAGE GLUCOSE RESULT: 9.3 % — HIGH (ref 4–5.6)
ALBUMIN SERPL ELPH-MCNC: 4 G/DL — SIGNIFICANT CHANGE UP (ref 3.5–5.2)
ALP SERPL-CCNC: 112 U/L — SIGNIFICANT CHANGE UP (ref 30–115)
ALT FLD-CCNC: 65 U/L — HIGH (ref 0–41)
ANION GAP SERPL CALC-SCNC: 13 MMOL/L — SIGNIFICANT CHANGE UP (ref 7–14)
AST SERPL-CCNC: 147 U/L — HIGH (ref 0–41)
BILIRUB SERPL-MCNC: 3 MG/DL — HIGH (ref 0.2–1.2)
BLD GP AB SCN SERPL QL: SIGNIFICANT CHANGE UP
BUN SERPL-MCNC: 10 MG/DL — SIGNIFICANT CHANGE UP (ref 10–20)
CALCIUM SERPL-MCNC: 9.2 MG/DL — SIGNIFICANT CHANGE UP (ref 8.5–10.1)
CHLORIDE SERPL-SCNC: 96 MMOL/L — LOW (ref 98–110)
CO2 SERPL-SCNC: 26 MMOL/L — SIGNIFICANT CHANGE UP (ref 17–32)
CREAT SERPL-MCNC: 0.7 MG/DL — SIGNIFICANT CHANGE UP (ref 0.7–1.5)
ESTIMATED AVERAGE GLUCOSE: 220 MG/DL — HIGH (ref 68–114)
GLUCOSE BLDC GLUCOMTR-MCNC: 168 MG/DL — HIGH (ref 70–99)
GLUCOSE BLDC GLUCOMTR-MCNC: 174 MG/DL — HIGH (ref 70–99)
GLUCOSE BLDC GLUCOMTR-MCNC: 227 MG/DL — HIGH (ref 70–99)
GLUCOSE BLDC GLUCOMTR-MCNC: 261 MG/DL — HIGH (ref 70–99)
GLUCOSE SERPL-MCNC: 179 MG/DL — HIGH (ref 70–99)
HCT VFR BLD CALC: 43.1 % — SIGNIFICANT CHANGE UP (ref 42–52)
HGB BLD-MCNC: 14.4 G/DL — SIGNIFICANT CHANGE UP (ref 14–18)
MCHC RBC-ENTMCNC: 32.6 PG — HIGH (ref 27–31)
MCHC RBC-ENTMCNC: 33.4 G/DL — SIGNIFICANT CHANGE UP (ref 32–37)
MCV RBC AUTO: 97.5 FL — HIGH (ref 80–94)
NRBC # BLD: 0 /100 WBCS — SIGNIFICANT CHANGE UP (ref 0–0)
PLATELET # BLD AUTO: 39 K/UL — LOW (ref 130–400)
POTASSIUM SERPL-MCNC: 5.1 MMOL/L — HIGH (ref 3.5–5)
POTASSIUM SERPL-SCNC: 5.1 MMOL/L — HIGH (ref 3.5–5)
PROT SERPL-MCNC: 6.7 G/DL — SIGNIFICANT CHANGE UP (ref 6–8)
RBC # BLD: 4.42 M/UL — LOW (ref 4.7–6.1)
RBC # FLD: 12.6 % — SIGNIFICANT CHANGE UP (ref 11.5–14.5)
SODIUM SERPL-SCNC: 135 MMOL/L — SIGNIFICANT CHANGE UP (ref 135–146)
WBC # BLD: 2.75 K/UL — LOW (ref 4.8–10.8)
WBC # FLD AUTO: 2.75 K/UL — LOW (ref 4.8–10.8)

## 2021-07-26 PROCEDURE — 99221 1ST HOSP IP/OBS SF/LOW 40: CPT

## 2021-07-26 PROCEDURE — 99232 SBSQ HOSP IP/OBS MODERATE 35: CPT

## 2021-07-26 PROCEDURE — 99233 SBSQ HOSP IP/OBS HIGH 50: CPT

## 2021-07-26 RX ORDER — FOLIC ACID 0.8 MG
1 TABLET ORAL
Qty: 30 | Refills: 0
Start: 2021-07-26 | End: 2021-08-24

## 2021-07-26 RX ORDER — PANTOPRAZOLE SODIUM 20 MG/1
1 TABLET, DELAYED RELEASE ORAL
Qty: 30 | Refills: 0
Start: 2021-07-26 | End: 2021-08-24

## 2021-07-26 RX ORDER — BACLOFEN 100 %
1 POWDER (GRAM) MISCELLANEOUS
Qty: 90 | Refills: 0
Start: 2021-07-26 | End: 2021-08-24

## 2021-07-26 RX ORDER — THIAMINE MONONITRATE (VIT B1) 100 MG
1 TABLET ORAL
Qty: 30 | Refills: 0
Start: 2021-07-26 | End: 2021-08-24

## 2021-07-26 RX ORDER — LISINOPRIL 2.5 MG/1
1 TABLET ORAL
Qty: 0 | Refills: 0 | DISCHARGE
Start: 2021-07-26

## 2021-07-26 RX ADMIN — Medication 6: at 11:15

## 2021-07-26 RX ADMIN — Medication 1 GRAM(S): at 01:05

## 2021-07-26 RX ADMIN — Medication 10 MILLIGRAM(S): at 05:55

## 2021-07-26 RX ADMIN — GABAPENTIN 300 MILLIGRAM(S): 400 CAPSULE ORAL at 22:31

## 2021-07-26 RX ADMIN — BUPRENORPHINE AND NALOXONE 1 TABLET(S): 2; .5 TABLET SUBLINGUAL at 05:55

## 2021-07-26 RX ADMIN — Medication 25 MILLIGRAM(S): at 05:55

## 2021-07-26 RX ADMIN — GABAPENTIN 300 MILLIGRAM(S): 400 CAPSULE ORAL at 05:55

## 2021-07-26 RX ADMIN — Medication 10 MILLIGRAM(S): at 22:31

## 2021-07-26 RX ADMIN — Medication 1 GRAM(S): at 11:16

## 2021-07-26 RX ADMIN — Medication 1 GRAM(S): at 17:42

## 2021-07-26 RX ADMIN — Medication 25 MILLIGRAM(S): at 09:17

## 2021-07-26 RX ADMIN — Medication 25 MILLIGRAM(S): at 02:51

## 2021-07-26 RX ADMIN — BUPRENORPHINE AND NALOXONE 1 TABLET(S): 2; .5 TABLET SUBLINGUAL at 17:42

## 2021-07-26 RX ADMIN — Medication 1 MILLIGRAM(S): at 11:16

## 2021-07-26 RX ADMIN — Medication 10 MILLIGRAM(S): at 17:42

## 2021-07-26 RX ADMIN — Medication 100 MILLIGRAM(S): at 11:16

## 2021-07-26 RX ADMIN — Medication 10 MILLIGRAM(S): at 13:59

## 2021-07-26 RX ADMIN — GABAPENTIN 300 MILLIGRAM(S): 400 CAPSULE ORAL at 13:59

## 2021-07-26 RX ADMIN — Medication 30 MILLILITER(S): at 20:51

## 2021-07-26 RX ADMIN — Medication 1 GRAM(S): at 05:55

## 2021-07-26 RX ADMIN — LISINOPRIL 20 MILLIGRAM(S): 2.5 TABLET ORAL at 05:55

## 2021-07-26 NOTE — PROGRESS NOTE ADULT - ASSESSMENT
52yo M  pmhx HTN, DM2, Alcohol/opiate abuse presents to ED for chest pain, nausea/vomiting for past week.    #Chest pain, nausea/vomiting  Possible due to alcohol gastritis, r/o gallstones  ACS ruled out, 3 negative troponins  CT Chest PE protocol (07/24): No central pulm embolus; hepatic steatosis)  RUQ US (07/25): hepatic steatosis  GI eval appreciated, recd EGD prior to discharge  - Zofran PRN  - Echocardiogram to r/o cardiomyopathy  - GI f/u regarding EGD scheduling if planned    #Alcohol withdrawal  #Hx of substance abuse  #Suspected thiamine deficiency  #Suspected folate deficiency  - Monitor CIWA  - Librium taper protocol  - Ativan 2mg Iv q2h PRN for severe symptoms  - Suboxone 8-2mg BID (ISTOP Reference #: [id number]). Pt states he attends program across the street from this hospital.   - CATCH team, addiction med consult requested  - Cont thiamine and folic acid    #Pancytopenia  Likely related to alcohol use  - Cont to monitor  - Hold DVT PPX for plt <50    #DM2  Takes metforming at home  - ISS while inpatient  - Will start basal-bolus insulin if FS are persistently elevated    #HTN  - Cont lisinopril 20mg qD  - Cont amlodipine 10mg qD    DVT PPX, held due to plt <50    #Progress Note Handoff  Pending (specify): CIWA protocol, EGD  Family discussion: d/w pt regarding treating for alcohol withdrawal  Disposition: Home 52yo M  pmhx HTN, DM2, Alcohol/opiate abuse presents to ED for chest pain, nausea/vomiting for past week.    #Chest pain, nausea/vomiting  Possible due to alcohol gastritis, r/o gallstones  ACS ruled out, 3 negative troponins  CT Chest PE protocol (07/24): No central pulm embolus; hepatic steatosis)  RUQ US (07/25): hepatic steatosis  GI eval appreciated, recd EGD prior to discharge  - Zofran PRN  - Echocardiogram to r/o cardiomyopathy  - EGD tentatively scheduled for tomorrow. If plt in AM <50, please transfuse 1U. NPO after midnight.  - ADP 24h if EGD is negative    #Alcohol withdrawal  #Hx of substance abuse  #Suspected thiamine deficiency  #Suspected folate deficiency  - Monitor CIWA  - Librium taper protocol- per CATCH team, can finish librium taper tomorrow  - Ativan 2mg Iv q2h PRN for severe symptoms  - Suboxone 8-2mg BID (ISTOP Reference #: [id number]). Pt states he attends program across the street from this hospital.   - Cont thiamine and folic acid    #Pancytopenia  Likely related to alcohol use  - Cont to monitor  - Hold DVT PPX for plt <50    #DM2  Takes metforming at home  - ISS while inpatient  - Will start basal-bolus insulin if FS are persistently elevated    #HTN  - Cont lisinopril 20mg qD  - Cont amlodipine 10mg qD    DVT PPX, held due to plt <50    #Progress Note Handoff  Pending (specify): CIWA protocol, EGD  Family discussion: d/w pt regarding treating for alcohol withdrawal  Disposition: Home

## 2021-07-26 NOTE — DISCHARGE NOTE PROVIDER - CARE PROVIDERS DIRECT ADDRESSES
[email or web address]

## 2021-07-26 NOTE — PROGRESS NOTE ADULT - ASSESSMENT
50 yo male with ETOH abuse with n/v and possible hematemesis    Problem 1-N/V possible hematemesis   PLAN  -2 gm Na diet  -Continue pantoprazole  -Continue detox protocol  -EGD before discharge  -Advised to STOP ETOH  -when patient is no longer withdrawing and platelets above 50 can do EGD r/o esophageal varices as source of hematemesis     Problem 2-Hepatic steatosis.  -altered LFTS likely due to ETOH  Rec  -Monitor LFTs  -MDF<32 no steroids indicated   -MELD score 16-CBD 5mm no evidence of cholelithiasis   -Dietary and lifestyle modifications advised  -ETOH cessation advised

## 2021-07-26 NOTE — DISCHARGE NOTE PROVIDER - NSDCPNSUBOBJ_GEN_ALL_CORE
Patient is a 51y old  Male who presents with a chief complaint of Nausea/vomiting (25 Jul 2021 15:03)      INTERVAL HPI/OVERNIGHT EVENTS: no acute overnight events noted     REVIEW OF SYSTEMS: negative  Vital Signs Last 24 Hrs  T(C): 37.2 (29 Jul 2021 13:52), Max: 37.2 (29 Jul 2021 13:52)  T(F): 98.9 (29 Jul 2021 13:52), Max: 98.9 (29 Jul 2021 13:52)  HR: 81 (29 Jul 2021 13:52) (69 - 81)  BP: 129/80 (29 Jul 2021 13:52) (129/80 - 153/93)  BP(mean): --  RR: 16 (29 Jul 2021 13:52) (16 - 16)  SpO2: --    PHYSICAL EXAM:   NAD; Normocephalic;   LUNGS - no wheezing  HEART: S1 S2+   ABDOMEN: Soft, Nontender, non distended  EXTREMITIES: no cyanosis; no edema  NERVOUS SYSTEM:  Awake and alert; no focal neuro deficits appreciated    LABS:                        13.5   2.40  )-----------( 62       ( 29 Jul 2021 06:47 )             40.3     07-29    135  |  97<L>  |  10  ----------------------------<  215<H>  4.6   |  26  |  0.7    Ca    9.5      29 Jul 2021 06:47  Mg     1.7     07-28    TPro  6.3  /  Alb  3.8  /  TBili  2.7<H>  /  DBili  x   /  AST  123<H>  /  ALT  52<H>  /  AlkPhos  108  07-29    PT/INR - ( 28 Jul 2021 06:52 )   PT: 16.50 sec;   INR: 1.43 ratio         PTT - ( 28 Jul 2021 06:52 )  PTT:35.7 sec    CAPILLARY BLOOD GLUCOSE      POCT Blood Glucose.: 223 mg/dL (29 Jul 2021 11:27)  POCT Blood Glucose.: 208 mg/dL (29 Jul 2021 07:42)  POCT Blood Glucose.: 219 mg/dL (28 Jul 2021 20:49)  POCT Blood Glucose.: 159 mg/dL (28 Jul 2021 16:27)      Medications:  MEDICATIONS  (STANDING):  baclofen 10 milliGRAM(s) Oral three times a day  buprenorphine 8 mG/naloxone 2 mG SL  Tablet 1 Tablet(s) SubLingual <User Schedule>  folic acid 1 milliGRAM(s) Oral daily  gabapentin 300 milliGRAM(s) Oral three times a day  insulin lispro (ADMELOG) corrective regimen sliding scale   SubCutaneous three times a day before meals  sucralfate suspension 1 Gram(s) Oral four times a day  thiamine 100 milliGRAM(s) Oral daily    MEDICATIONS  (PRN):  aluminum hydroxide/magnesium hydroxide/simethicone Suspension 30 milliLiter(s) Oral every 4 hours PRN Dyspepsia  LORazepam   Injectable 2 milliGRAM(s) IV Push every 2 hours PRN Alcohol withdrawal  ondansetron Injectable 4 milliGRAM(s) IV Push four times a day PRN Nausea and/or Vomiting    52yo M  pmhx HTN, DM2, Alcohol/opiate abuse presents to ED for chest pain, nausea/vomiting for past week.    Chest pain, nausea/vomiting  Possible due to alcohol gastritis, r/o gallstones  ACS ruled out, 3 negative troponins, can dc tele  CT Chest PE protocol (07/24): No central pulm embolus; hepatic steatosis)  RUQ US (07/25): hepatic steatosis  Discussed with GI  EGD -  Grade B esophagitis in the lower third of the esophagus compatible with nonspecific erosive esophagitis. Congestion and erythema in the stomach compatible with non-erosive gastritis. (Biopsy). Normal mucosa in the whole examined duodenum.   Follow-up visit in 2-4 weeks in the GI MAP clinic  Protonix 40 mg po daily    Hyperkalemia, mild  K 5.3 - hemolyzed - repeat in am     Alcohol withdrawal  Hx of substance abuse  Suspected thiamine deficiency  Suspected folate deficiency  Pancytopenia  - Monitor CIWA, LIbrium taper finishing today   - Ativan 2mg Iv q2h PRN for severe symptoms  - Suboxone 8-2mg BID (ISTOP Reference #: [id number]). Pt states he attends program across the street from this hospital.   - Cont thiamine and folic acid  - blood count stable - work up never done   - will send vitamin b12, folate, hiv, hcv, retic count before discharge - follow with out patient hem/onc for results and further work up  - discussed with Dr. Nicole over phone - agrees with above plan     DM2  Takes metformin at home  can start Lantus/Lispro if FS persistently >180    HTN  - holding lisinopril given hyper K  - Cont amlodipine 10mg qD    DVT PPX, improve score of 0 no need for chemical proph     #Progress Note Handoff  patient is hemodynamically stable and ready for discharge.

## 2021-07-26 NOTE — DISCHARGE NOTE PROVIDER - PROVIDER TOKENS
PROVIDER:[TOKEN:[[id number]],FOLLOWUP:[2 weeks]] PROVIDER:[TOKEN:[[id number]],FOLLOWUP:[2 weeks]],PROVIDER:[TOKEN:[[id number]],FOLLOWUP:[2 weeks]]

## 2021-07-26 NOTE — DISCHARGE NOTE PROVIDER - NSDCCPCAREPLAN_GEN_ALL_CORE_FT
PRINCIPAL DISCHARGE DIAGNOSIS  Diagnosis: Alcoholic hepatitis  Assessment and Plan of Treatment: f/u with GI for further management of alcoholic hepatitis. We strongly advise alcohol cessation.      SECONDARY DISCHARGE DIAGNOSES  Diagnosis: DM (diabetes mellitus)  Assessment and Plan of Treatment:     Diagnosis: Thrombocytopenia  Assessment and Plan of Treatment:     Diagnosis: Alcohol abuse  Assessment and Plan of Treatment:     Diagnosis: Alcohol withdrawal  Assessment and Plan of Treatment:     Diagnosis: Hypomagnesemia  Assessment and Plan of Treatment:     Diagnosis: Lymphopenia  Assessment and Plan of Treatment:      PRINCIPAL DISCHARGE DIAGNOSIS  Diagnosis: Alcoholic hepatitis  Assessment and Plan of Treatment: f/u with GI for further management of alcoholic hepatitis. We strongly advise alcohol cessation.      SECONDARY DISCHARGE DIAGNOSES  Diagnosis: Pancytopenia  Assessment and Plan of Treatment: - likely from chronic alcohol use   - follow up with hematolgy oncology in 3-4 weeks    Diagnosis: Erosive esophagitis  Assessment and Plan of Treatment: EGD -  Grade B esophagitis in the lower third of the esophagus compatible with nonspecific erosive esophagitis. Congestion and erythema in the stomach compatible with non-erosive gastritis. (Biopsy). Normal mucosa in the whole examined duodenum.   Follow-up visit in 2-4 weeks in the GI MAP clinic  - continues protonix daily on discharge.     PRINCIPAL DISCHARGE DIAGNOSIS  Diagnosis: Alcoholic hepatitis  Assessment and Plan of Treatment: f/u with GI for further management of alcoholic hepatitis. We strongly advise alcohol cessation.      SECONDARY DISCHARGE DIAGNOSES  Diagnosis: Pancytopenia  Assessment and Plan of Treatment: - likely from chronic alcohol use   - follow up with hematolgy oncology in 3-4 weeks    Diagnosis: Erosive esophagitis  Assessment and Plan of Treatment: EGD -  Grade B esophagitis in the lower third of the esophagus compatible with nonspecific erosive esophagitis. Congestion and erythema in the stomach compatible with non-erosive gastritis. (Biopsy). Normal mucosa in the whole examined duodenum.   Follow-up visit in 2-4 weeks in the GI MAP clinic  - continues protonix daily on discharge.    Diagnosis: HTN (hypertension)  Assessment and Plan of Treatment: hold lisinopril on discharge as your potassium level was on higher side. Please follow up with your PCP in week for repeat blood work and for further optimization of medications

## 2021-07-26 NOTE — CONSULT NOTE ADULT - PROBLEM SELECTOR RECOMMENDATION 9
After evaluation at this time protocol was adjusted for librium due to his short use of 10 days and how well he is doing today after being front loaded with librium protocol. Pt is feeling well and wants to be discharged tomorrow.  Pt will be monitored and supportive care provided  CATCH team involved for aftercare and pt will follow up with aftercare at [facility] as he is currently enrolled.

## 2021-07-26 NOTE — CONSULT NOTE ADULT - SUBJECTIVE AND OBJECTIVE BOX
Pt interviewed, examined and EMR chart reviewed.  Pt admits to drinking 1 pt of vodka plus 6 beers per day for approximately 10 days after his sister . Pt states he was sober for the last 9 months prior. Pt is also on Suboxone from and is doing well. Pt states history of withdrawal tremors and blackouts but denies any seizures. Pt denies any other illicit drug use at this time.Last Drink  day prior to admission  Hx of withdrawal   variable periods of sobriety in the past.  Has been in detox before __X___yes  ,   _____No    SOCIAL HISTORY:    REVIEW OF SYSTEMS:    Constitutional: No fever, weight loss or fatigue  ENT:  No difficulty hearing, tinnitus, vertigo; No sinus or throat pain  Neck: No pain or stiffness  Respiratory: No cough, wheezing, chills or hemoptysis  Cardiovascular: No chest pain, palpitations, shortness of breath, dizziness or leg swelling  Gastrointestinal: No abdominal or epigastric pain. No nausea, vomiting or hematemesis; No diarrhea or constipation. No melena or hematochezia.  Neurological: No headaches, memory loss, loss of strength, numbness or tremors  Musculoskeletal: No joint pain or swelling; No muscle, back or extremity pain  Psychiatric: No depression, anxiety, mood swings or difficulty sleeping    MEDICATIONS  (STANDING):  baclofen 10 milliGRAM(s) Oral three times a day  buprenorphine 8 mG/naloxone 2 mG SL  Tablet 1 Tablet(s) SubLingual <User Schedule>  chlordiazePOXIDE 10 milliGRAM(s) Oral every 4 hours  dextrose 40% Gel 15 Gram(s) Oral once  dextrose 5%. 1000 milliLiter(s) (50 mL/Hr) IV Continuous <Continuous>  dextrose 5%. 1000 milliLiter(s) (100 mL/Hr) IV Continuous <Continuous>  dextrose 50% Injectable 25 Gram(s) IV Push once  dextrose 50% Injectable 25 Gram(s) IV Push once  dextrose 50% Injectable 12.5 Gram(s) IV Push once  folic acid 1 milliGRAM(s) Oral daily  gabapentin 300 milliGRAM(s) Oral three times a day  glucagon  Injectable 1 milliGRAM(s) IntraMuscular once  insulin lispro (ADMELOG) corrective regimen sliding scale   SubCutaneous three times a day before meals  lisinopril 20 milliGRAM(s) Oral daily  sucralfate suspension 1 Gram(s) Oral four times a day  thiamine 100 milliGRAM(s) Oral daily    MEDICATIONS  (PRN):  aluminum hydroxide/magnesium hydroxide/simethicone Suspension 30 milliLiter(s) Oral every 4 hours PRN Dyspepsia  LORazepam   Injectable 2 milliGRAM(s) IV Push every 2 hours PRN Alcohol withdrawal  ondansetron Injectable 4 milliGRAM(s) IV Push four times a day PRN Nausea and/or Vomiting      Vital Signs Last 24 Hrs  T(C): 36.5 (2021 13:37), Max: 37.3 (2021 05:16)  T(F): 97.7 (2021 13:37), Max: 99.1 (2021 05:16)  HR: 91 (2021 13:37) (73 - 97)  BP: 153/82 (2021 13:37) (143/76 - 155/94)  BP(mean): --  RR: 18 (2021 13:37) (18 - 18)  SpO2: --    PHYSICAL EXAM:    Constitutional: NAD, well-groomed, well-developed  HEENT: PERRLA, EOMI, Normal Hearing, MMM  Neck: No LAD, No JVD  Back: Normal spine flexure, No CVA tenderness  Respiratory: CTAB/L  Cardiovascular: S1 and S2, RRR, no M/G/R  Gastrointestinal: BS+, soft, NT/ND  Extremities: No peripheral edema  Vascular: 2+ peripheral pulses  Neurological: A/O x 3, no focal deficits    LABS:                        14.4   2.75  )-----------( 39       ( 2021 07:46 )             43.1         135  |  96<L>  |  10  ----------------------------<  179<H>  5.1<H>   |  26  |  0.7    Ca    9.2      2021 07:46    TPro  6.7  /  Alb  4.0  /  TBili  3.0<H>  /  DBili  x   /  AST  147<H>  /  ALT  65<H>  /  AlkPhos  112  07    PT/INR - ( 2021 06:32 )   PT: 14.90 sec;   INR: 1.30 ratio         PTT - ( 2021 06:32 )  PTT:32.7 sec    Drug Screen Urine:  Alcohol Level  Alcohol, Blood: 59 mg/dL (21 @ 12:30)        RADIOLOGY & ADDITIONAL STUDIES:  
HPI:  52yo M  pmhx HTN, DM2, Alcohol/opiate abuse presents to ED for chest pain, nausea/vomiting for past week. Patient endorses feeling nauseous and vomiting several times a day since last week. He believes he pulled his muscle from vomiting which may be related to the chest pain he is feeling. Endorses drinking a pint of liquor and several beers a day. Chest pain not worse with rest. No abdominal pain, diarrhea. Endorses some dyspnea symptoms. No fevers/chills at home. Had admission back in 2019 for alcohol withdrawal. (24 Jul 2021 16:31)  Also states he had hematemesis x 2.  Currently no nausea or vomiting    PAST MEDICAL & SURGICAL HISTORY:  HTN (hypertension)    DM (diabetes mellitus)    History of incision and drainage        REVIEW OF SYSTEMS:    CONSTITUTIONAL: No weakness, fevers or chills  EYES/ENT: No visual changes;  No vertigo or throat pain   NECK: No pain or stiffness  RESPIRATORY: No cough, wheezing, hemoptysis; No shortness of breath  CARDIOVASCULAR: No chest pain or palpitations  GASTROINTESTINAL: No abdominal or epigastric pain. No nausea, vomiting, or hematemesis; No diarrhea or constipation. No melena or hematochezia.  GENITOURINARY: No dysuria, frequency or hematuria  NEUROLOGICAL: No numbness or weakness  SKIN: No itching, rashes      MEDICATIONS  (STANDING):  amLODIPine   Tablet 10 milliGRAM(s) Oral once  baclofen 10 milliGRAM(s) Oral three times a day  buprenorphine 8 mG/naloxone 2 mG SL  Tablet 1 Tablet(s) SubLingual <User Schedule>  chlordiazePOXIDE   Oral   chlordiazePOXIDE 50 milliGRAM(s) Oral every 4 hours  chlordiazePOXIDE 25 milliGRAM(s) Oral every 4 hours  dextrose 40% Gel 15 Gram(s) Oral once  dextrose 5%. 1000 milliLiter(s) (50 mL/Hr) IV Continuous <Continuous>  dextrose 5%. 1000 milliLiter(s) (100 mL/Hr) IV Continuous <Continuous>  dextrose 50% Injectable 25 Gram(s) IV Push once  dextrose 50% Injectable 12.5 Gram(s) IV Push once  dextrose 50% Injectable 25 Gram(s) IV Push once  folic acid 1 milliGRAM(s) Oral daily  gabapentin 300 milliGRAM(s) Oral three times a day  glucagon  Injectable 1 milliGRAM(s) IntraMuscular once  insulin lispro (ADMELOG) corrective regimen sliding scale   SubCutaneous three times a day before meals  lisinopril 20 milliGRAM(s) Oral daily  sucralfate suspension 1 Gram(s) Oral four times a day  thiamine 100 milliGRAM(s) Oral daily    MEDICATIONS  (PRN):  aluminum hydroxide/magnesium hydroxide/simethicone Suspension 30 milliLiter(s) Oral every 4 hours PRN Dyspepsia  LORazepam   Injectable 2 milliGRAM(s) IV Push every 2 hours PRN Alcohol withdrawal  ondansetron Injectable 4 milliGRAM(s) IV Push four times a day PRN Nausea and/or Vomiting      Allergies    No Known Allergies    Intolerances        SOCIAL HISTORY:    FAMILY HISTORY:  FH: alcohol abuse  dad        Vital Signs Last 24 Hrs  T(C): 37.1 (25 Jul 2021 14:10), Max: 37.1 (25 Jul 2021 14:10)  T(F): 98.8 (25 Jul 2021 14:10), Max: 98.8 (25 Jul 2021 14:10)  HR: 88 (25 Jul 2021 14:10) (59 - 88)  BP: 136/93 (25 Jul 2021 14:10) (136/93 - 194/101)  BP(mean): --  RR: 18 (25 Jul 2021 05:31) (18 - 18)  SpO2: 97% (24 Jul 2021 16:26) (96% - 97%)    PHYSICAL EXAM:  GENERAL: NAD, well-developed, well nourished, looks stated age  HEAD:  Atraumatic, Normocephalic  EYES: EOMI, PERRLA, conjunctiva and sclera clear  NECK: Supple, No JVD, no bruits, no masses, no thyroid enlargement  ENMT: No tonsillar erythema, exudated or enlargement, moist mucous membranes  CHEST/LUNG: Clear to auscultation bilaterally; No rales, rhonchi or wheezing, no dullness to percussion  HEART: S1,S2 Regular rate and rhythm; No murmurs, rubs, or gallops  ABDOMEN: Soft, nontender, nondistended, no rebound tenderness; No palpable masses, no hernias, hepatomegaly; Bowel sounds present and normoactive  EXTREMITIES:  2+ peripheral pulses bilaterally and symmetrically, no clubbing, cyanosis, or edema  LYMPH: No lymphadenopathy  PSYCH: AAOx3, normal mood and affect  NEUROLOGY: non-focal, muscle strength 5/5 all extremities, DTRs 2+ symmetrically      LABS:                        14.6   2.20  )-----------( 34       ( 25 Jul 2021 06:32 )             43.2     07-25    134<L>  |  93<L>  |  13  ----------------------------<  183<H>  4.2   |  29  |  0.6<L>    Ca    8.7      25 Jul 2021 06:32  Mg     1.5     07-24    TPro  6.5  /  Alb  3.7  /  TBili  2.3<H>  /  DBili  x   /  AST  163<H>  /  ALT  71<H>  /  AlkPhos  112  07-25    PT/INR - ( 25 Jul 2021 06:32 )   PT: 14.90 sec;   INR: 1.30 ratio         PTT - ( 25 Jul 2021 06:32 )  PTT:32.7 sec      RADIOLOGY & ADDITIONAL STUDIES:

## 2021-07-26 NOTE — DISCHARGE NOTE PROVIDER - CARE PROVIDER_API CALL
Brandon Yost  Gastroenterology  [address] [place] [address]  Phone: [phone]  Fax: [phone]  Follow Up Time: 2 weeks   Brandon Yost  Gastroenterology  [address] [place] [address]  Phone: [phone]  Fax: [phone]  Follow Up Time: 2 weeks    Gabriel Nicole)  Hematology; Internal Medicine; Medical Oncology  [address] [place] [address]  Phone: [phone]  Fax: [phone]  Follow Up Time: 2 weeks

## 2021-07-26 NOTE — DISCHARGE NOTE PROVIDER - HOSPITAL COURSE
52yo M  pmhx HTN, DM2, Alcohol/opiate abuse presents to ED for chest pain, nausea/vomiting for past week. Patient endorses feeling nauseous and vomiting several times a day since last week. He believes he pulled his muscle from vomiting which may be related to the chest pain he is feeling. Endorses drinking a pint of liquor and several beers a day. Chest pain not worse with rest. No abdominal pain, diarrhea. Endorses some dyspnea symptoms. No fevers/chills at home. Had admission back in 2019 for alcohol withdrawal.    Pt's symptoms resolved throughout admission with supportive treatment. He was treated for alcohol withdrawal with librium protocol. EGD was done inpatient to screen for esophageal varices. Patient is medicalyl stable for discharge.

## 2021-07-26 NOTE — CONSULT NOTE ADULT - PROBLEM SELECTOR RECOMMENDATION 2
Follow up with Dr Bundy to continue on Suboxone treatment. Pt may transition to OPD treatment at [facility] for rx if he prefers.

## 2021-07-26 NOTE — DISCHARGE NOTE PROVIDER - NSDCMRMEDTOKEN_GEN_ALL_CORE_FT
buprenorphine-naloxone 8 mg-2 mg sublingual tablet:  sublingual   buprenorphine-naloxone 8 mg-2 mg sublingual tablet: 1 tab(s) sublingually once a day MDD:1 tablet  gabapentin 300 mg oral capsule: 1 cap(s) orally every 8 hours   lisinopril 20 mg oral tablet: 1 tab(s) orally once a day  metFORMIN 500 mg oral tablet: 1 tab(s) orally once a day    buprenorphine-naloxone 8 mg-2 mg sublingual tablet:  sublingual   buprenorphine-naloxone 8 mg-2 mg sublingual tablet: 1 tab(s) sublingually once a day MDD:1 tablet  gabapentin 300 mg oral capsule: 1 cap(s) orally every 8 hours   metFORMIN 500 mg oral tablet: 1 tab(s) orally once a day

## 2021-07-27 LAB
ALBUMIN SERPL ELPH-MCNC: 3.7 G/DL — SIGNIFICANT CHANGE UP (ref 3.5–5.2)
ALP SERPL-CCNC: 105 U/L — SIGNIFICANT CHANGE UP (ref 30–115)
ALT FLD-CCNC: 50 U/L — HIGH (ref 0–41)
ANION GAP SERPL CALC-SCNC: 6 MMOL/L — LOW (ref 7–14)
AST SERPL-CCNC: 107 U/L — HIGH (ref 0–41)
BILIRUB SERPL-MCNC: 3.1 MG/DL — HIGH (ref 0.2–1.2)
BUN SERPL-MCNC: 12 MG/DL — SIGNIFICANT CHANGE UP (ref 10–20)
CALCIUM SERPL-MCNC: 9.8 MG/DL — SIGNIFICANT CHANGE UP (ref 8.5–10.1)
CHLORIDE SERPL-SCNC: 98 MMOL/L — SIGNIFICANT CHANGE UP (ref 98–110)
CO2 SERPL-SCNC: 31 MMOL/L — SIGNIFICANT CHANGE UP (ref 17–32)
CREAT SERPL-MCNC: 0.8 MG/DL — SIGNIFICANT CHANGE UP (ref 0.7–1.5)
GLUCOSE BLDC GLUCOMTR-MCNC: 158 MG/DL — HIGH (ref 70–99)
GLUCOSE BLDC GLUCOMTR-MCNC: 174 MG/DL — HIGH (ref 70–99)
GLUCOSE BLDC GLUCOMTR-MCNC: 205 MG/DL — HIGH (ref 70–99)
GLUCOSE BLDC GLUCOMTR-MCNC: 232 MG/DL — HIGH (ref 70–99)
GLUCOSE SERPL-MCNC: 203 MG/DL — HIGH (ref 70–99)
HCT VFR BLD CALC: 41.2 % — LOW (ref 42–52)
HGB BLD-MCNC: 13.9 G/DL — LOW (ref 14–18)
MCHC RBC-ENTMCNC: 33.2 PG — HIGH (ref 27–31)
MCHC RBC-ENTMCNC: 33.7 G/DL — SIGNIFICANT CHANGE UP (ref 32–37)
MCV RBC AUTO: 98.3 FL — HIGH (ref 80–94)
NRBC # BLD: 0 /100 WBCS — SIGNIFICANT CHANGE UP (ref 0–0)
PLATELET # BLD AUTO: 46 K/UL — LOW (ref 130–400)
POTASSIUM SERPL-MCNC: 5.4 MMOL/L — HIGH (ref 3.5–5)
POTASSIUM SERPL-SCNC: 5.4 MMOL/L — HIGH (ref 3.5–5)
PROT SERPL-MCNC: 6 G/DL — SIGNIFICANT CHANGE UP (ref 6–8)
RBC # BLD: 4.19 M/UL — LOW (ref 4.7–6.1)
RBC # FLD: 12.6 % — SIGNIFICANT CHANGE UP (ref 11.5–14.5)
SODIUM SERPL-SCNC: 135 MMOL/L — SIGNIFICANT CHANGE UP (ref 135–146)
WBC # BLD: 2.82 K/UL — LOW (ref 4.8–10.8)
WBC # FLD AUTO: 2.82 K/UL — LOW (ref 4.8–10.8)

## 2021-07-27 PROCEDURE — 99233 SBSQ HOSP IP/OBS HIGH 50: CPT

## 2021-07-27 PROCEDURE — 99232 SBSQ HOSP IP/OBS MODERATE 35: CPT

## 2021-07-27 PROCEDURE — 93306 TTE W/DOPPLER COMPLETE: CPT | Mod: 26

## 2021-07-27 RX ORDER — HYDROXYZINE HCL 10 MG
50 TABLET ORAL ONCE
Refills: 0 | Status: COMPLETED | OUTPATIENT
Start: 2021-07-27 | End: 2021-07-27

## 2021-07-27 RX ADMIN — Medication 1 GRAM(S): at 17:07

## 2021-07-27 RX ADMIN — Medication 2: at 07:58

## 2021-07-27 RX ADMIN — Medication 10 MILLIGRAM(S): at 10:19

## 2021-07-27 RX ADMIN — GABAPENTIN 300 MILLIGRAM(S): 400 CAPSULE ORAL at 05:49

## 2021-07-27 RX ADMIN — Medication 4: at 17:07

## 2021-07-27 RX ADMIN — Medication 10 MILLIGRAM(S): at 02:01

## 2021-07-27 RX ADMIN — LISINOPRIL 20 MILLIGRAM(S): 2.5 TABLET ORAL at 05:49

## 2021-07-27 RX ADMIN — Medication 10 MILLIGRAM(S): at 13:50

## 2021-07-27 RX ADMIN — Medication 10 MILLIGRAM(S): at 21:21

## 2021-07-27 RX ADMIN — BUPRENORPHINE AND NALOXONE 1 TABLET(S): 2; .5 TABLET SUBLINGUAL at 05:49

## 2021-07-27 RX ADMIN — GABAPENTIN 300 MILLIGRAM(S): 400 CAPSULE ORAL at 13:51

## 2021-07-27 RX ADMIN — Medication 1 GRAM(S): at 23:36

## 2021-07-27 RX ADMIN — Medication 1 GRAM(S): at 11:04

## 2021-07-27 RX ADMIN — Medication 2: at 12:04

## 2021-07-27 RX ADMIN — BUPRENORPHINE AND NALOXONE 1 TABLET(S): 2; .5 TABLET SUBLINGUAL at 17:07

## 2021-07-27 RX ADMIN — Medication 1 MILLIGRAM(S): at 11:04

## 2021-07-27 RX ADMIN — Medication 10 MILLIGRAM(S): at 05:50

## 2021-07-27 RX ADMIN — Medication 1 GRAM(S): at 05:50

## 2021-07-27 RX ADMIN — Medication 50 MILLIGRAM(S): at 23:36

## 2021-07-27 RX ADMIN — Medication 100 MILLIGRAM(S): at 11:04

## 2021-07-27 RX ADMIN — GABAPENTIN 300 MILLIGRAM(S): 400 CAPSULE ORAL at 21:21

## 2021-07-27 RX ADMIN — Medication 10 MILLIGRAM(S): at 05:49

## 2021-07-27 NOTE — PROGRESS NOTE ADULT - ASSESSMENT
52yo M  pmhx HTN, DM2, Alcohol/opiate abuse presents to ED for chest pain, nausea/vomiting for past week.    Chest pain, nausea/vomiting  Possible due to alcohol gastritis, r/o gallstones  ACS ruled out, 3 negative troponins, can dc tele  CT Chest PE protocol (07/24): No central pulm embolus; hepatic steatosis)  RUQ US (07/25): hepatic steatosis  Discussed with GI  planned for EGD tomorrow, will transfuse 1u plts today, goal >50K  NPO after midnight    Hyperkalemia, mild  K 5.4 today, holding lisinopril    Alcohol withdrawal  Hx of substance abuse  Suspected thiamine deficiency  Suspected folate deficiency  Pancytopenia  - Monitor CIWA, LIbrium taper finishing today   - Ativan 2mg Iv q2h PRN for severe symptoms  - Suboxone 8-2mg BID (ISTOP Reference #: [id number]). Pt states he attends program across the street from this hospital.   - Cont thiamine and folic acid    DM2  Takes metformin at home  holding insulin while NPO  can start Lantus/Lispro if FS persistently >180    HTN  - holding lisinopril given hyper K  - Cont amlodipine 10mg qD    DVT PPX, held due to plt <50    #Progress Note Handoff  Pending (specify): CIWA protocol, EGD, plts transfusion   Family discussion: plan of care discussed with patient, all questions answered   Disposition: Home, anticipate dc tomorrow pending EGD    50yo M  pmhx HTN, DM2, Alcohol/opiate abuse presents to ED for chest pain, nausea/vomiting for past week.    Chest pain, nausea/vomiting  Possible due to alcohol gastritis, r/o gallstones  ACS ruled out, 3 negative troponins, can dc tele  CT Chest PE protocol (07/24): No central pulm embolus; hepatic steatosis)  RUQ US (07/25): hepatic steatosis  Discussed with GI  planned for EGD tomorrow, will need platelet transfusion prior to procedure  platelets will be available in the afternoon for transfusion tomorrow   NPO after midnight    Hyperkalemia, mild  K 5.4 today, holding lisinopril    Alcohol withdrawal  Hx of substance abuse  Suspected thiamine deficiency  Suspected folate deficiency  Pancytopenia  - Monitor CIWA, LIbrium taper finishing today   - Ativan 2mg Iv q2h PRN for severe symptoms  - Suboxone 8-2mg BID ([facility] Reference #: [id number]). Pt states he attends program across the street from this hospital.   - Cont thiamine and folic acid    DM2  Takes metformin at home  holding insulin while NPO  can start Lantus/Lispro if FS persistently >180    HTN  - holding lisinopril given hyper K  - Cont amlodipine 10mg qD    DVT PPX, held due to plt <50    #Progress Note Handoff  Pending (specify): CIWA protocol, EGD, plts transfusion   Family discussion: plan of care discussed with patient, all questions answered   Disposition: Home, anticipate dc tomorrow pending EGD

## 2021-07-27 NOTE — PROGRESS NOTE ADULT - ASSESSMENT
52 yo male with ETOH abuse with n/v and possible hematemesis    Problem 1-N/V possible hematemesis   likely with early liver cirrhosis   PLAN  -will swab for covid-19  -NPO aftermidnight   -platelets above 50, called blood bank will need to order platelets to be ready,   -correct potassium   -EGD tomorrow   -The benefits of endoscopy as well as the risks, such as GI bleeding, aspiration pneumonia, perforation, damage or loss of teeth, reaction to medication, or death  were reviewed with the patient.   -2 gm Na diet  -Continue pantoprazole  -Continue detox protocol  -EGD before discharge  -Advised to STOP ETOH  -RUQ and AFP every 6 months, screening for varices     Problem 2-Hepatic steatosis.  -altered LFTS likely due to ETOH  Rec  -Monitor LFTs  -MDF<32 no steroids indicated   -MELD score 16-CBD 5mm no evidence of cholelithiasis   -Dietary and lifestyle modifications advised  -ETOH cessation advised

## 2021-07-28 ENCOUNTER — TRANSCRIPTION ENCOUNTER (OUTPATIENT)
Age: 51
End: 2021-07-28

## 2021-07-28 ENCOUNTER — RESULT REVIEW (OUTPATIENT)
Age: 51
End: 2021-07-28

## 2021-07-28 LAB
ALBUMIN SERPL ELPH-MCNC: 3.8 G/DL — SIGNIFICANT CHANGE UP (ref 3.5–5.2)
ALP SERPL-CCNC: 113 U/L — SIGNIFICANT CHANGE UP (ref 30–115)
ALT FLD-CCNC: 52 U/L — HIGH (ref 0–41)
ANION GAP SERPL CALC-SCNC: 11 MMOL/L — SIGNIFICANT CHANGE UP (ref 7–14)
APTT BLD: 35.7 SEC — SIGNIFICANT CHANGE UP (ref 27–39.2)
AST SERPL-CCNC: 110 U/L — HIGH (ref 0–41)
BASOPHILS # BLD AUTO: 0.01 K/UL — SIGNIFICANT CHANGE UP (ref 0–0.2)
BASOPHILS NFR BLD AUTO: 0.3 % — SIGNIFICANT CHANGE UP (ref 0–1)
BILIRUB SERPL-MCNC: 2.7 MG/DL — HIGH (ref 0.2–1.2)
BUN SERPL-MCNC: 12 MG/DL — SIGNIFICANT CHANGE UP (ref 10–20)
CALCIUM SERPL-MCNC: 9.9 MG/DL — SIGNIFICANT CHANGE UP (ref 8.5–10.1)
CHLORIDE SERPL-SCNC: 99 MMOL/L — SIGNIFICANT CHANGE UP (ref 98–110)
CO2 SERPL-SCNC: 27 MMOL/L — SIGNIFICANT CHANGE UP (ref 17–32)
CREAT SERPL-MCNC: 0.6 MG/DL — LOW (ref 0.7–1.5)
EOSINOPHIL # BLD AUTO: 0.06 K/UL — SIGNIFICANT CHANGE UP (ref 0–0.7)
EOSINOPHIL NFR BLD AUTO: 2 % — SIGNIFICANT CHANGE UP (ref 0–8)
GLUCOSE BLDC GLUCOMTR-MCNC: 159 MG/DL — HIGH (ref 70–99)
GLUCOSE BLDC GLUCOMTR-MCNC: 194 MG/DL — HIGH (ref 70–99)
GLUCOSE BLDC GLUCOMTR-MCNC: 219 MG/DL — HIGH (ref 70–99)
GLUCOSE SERPL-MCNC: 222 MG/DL — HIGH (ref 70–99)
HCT VFR BLD CALC: 43.9 % — SIGNIFICANT CHANGE UP (ref 42–52)
HGB BLD-MCNC: 14.4 G/DL — SIGNIFICANT CHANGE UP (ref 14–18)
IMM GRANULOCYTES NFR BLD AUTO: 0.3 % — SIGNIFICANT CHANGE UP (ref 0.1–0.3)
INR BLD: 1.43 RATIO — HIGH (ref 0.65–1.3)
LYMPHOCYTES # BLD AUTO: 1.38 K/UL — SIGNIFICANT CHANGE UP (ref 1.2–3.4)
LYMPHOCYTES # BLD AUTO: 46.6 % — SIGNIFICANT CHANGE UP (ref 20.5–51.1)
MAGNESIUM SERPL-MCNC: 1.7 MG/DL — LOW (ref 1.8–2.4)
MCHC RBC-ENTMCNC: 32.8 G/DL — SIGNIFICANT CHANGE UP (ref 32–37)
MCHC RBC-ENTMCNC: 33.1 PG — HIGH (ref 27–31)
MCV RBC AUTO: 100.9 FL — HIGH (ref 80–94)
MONOCYTES # BLD AUTO: 0.53 K/UL — SIGNIFICANT CHANGE UP (ref 0.1–0.6)
MONOCYTES NFR BLD AUTO: 17.9 % — HIGH (ref 1.7–9.3)
NEUTROPHILS # BLD AUTO: 0.97 K/UL — LOW (ref 1.4–6.5)
NEUTROPHILS NFR BLD AUTO: 32.9 % — LOW (ref 42.2–75.2)
NRBC # BLD: 0 /100 WBCS — SIGNIFICANT CHANGE UP (ref 0–0)
PLATELET # BLD AUTO: 59 K/UL — LOW (ref 130–400)
POTASSIUM SERPL-MCNC: 5.3 MMOL/L — HIGH (ref 3.5–5)
POTASSIUM SERPL-SCNC: 5.3 MMOL/L — HIGH (ref 3.5–5)
PROT SERPL-MCNC: 6.5 G/DL — SIGNIFICANT CHANGE UP (ref 6–8)
PROTHROM AB SERPL-ACNC: 16.5 SEC — HIGH (ref 9.95–12.87)
RBC # BLD: 4.35 M/UL — LOW (ref 4.7–6.1)
RBC # FLD: 12.9 % — SIGNIFICANT CHANGE UP (ref 11.5–14.5)
SODIUM SERPL-SCNC: 137 MMOL/L — SIGNIFICANT CHANGE UP (ref 135–146)
WBC # BLD: 2.96 K/UL — LOW (ref 4.8–10.8)
WBC # FLD AUTO: 2.96 K/UL — LOW (ref 4.8–10.8)

## 2021-07-28 PROCEDURE — 88305 TISSUE EXAM BY PATHOLOGIST: CPT | Mod: 26

## 2021-07-28 PROCEDURE — 88312 SPECIAL STAINS GROUP 1: CPT | Mod: 26

## 2021-07-28 PROCEDURE — 99232 SBSQ HOSP IP/OBS MODERATE 35: CPT

## 2021-07-28 PROCEDURE — 43239 EGD BIOPSY SINGLE/MULTIPLE: CPT

## 2021-07-28 RX ORDER — HYDROXYZINE HCL 10 MG
25 TABLET ORAL ONCE
Refills: 0 | Status: COMPLETED | OUTPATIENT
Start: 2021-07-28 | End: 2021-07-28

## 2021-07-28 RX ADMIN — BUPRENORPHINE AND NALOXONE 1 TABLET(S): 2; .5 TABLET SUBLINGUAL at 18:00

## 2021-07-28 RX ADMIN — GABAPENTIN 300 MILLIGRAM(S): 400 CAPSULE ORAL at 06:27

## 2021-07-28 RX ADMIN — Medication 1 GRAM(S): at 06:27

## 2021-07-28 RX ADMIN — Medication 10 MILLIGRAM(S): at 21:40

## 2021-07-28 RX ADMIN — BUPRENORPHINE AND NALOXONE 1 TABLET(S): 2; .5 TABLET SUBLINGUAL at 06:26

## 2021-07-28 RX ADMIN — Medication 1 GRAM(S): at 18:00

## 2021-07-28 RX ADMIN — GABAPENTIN 300 MILLIGRAM(S): 400 CAPSULE ORAL at 21:40

## 2021-07-28 RX ADMIN — Medication 10 MILLIGRAM(S): at 06:27

## 2021-07-28 NOTE — CHART NOTE - NSCHARTNOTEFT_GEN_A_CORE
PACU ANESTHESIA ADMISSION NOTE      Procedure:   Post op diagnosis:      ____  Intubated  TV:______       Rate: ______      FiO2: ______    __x__  Patent Airway    __x__  Full return of protective reflexes    ___x_  Full recovery from anesthesia / back to baseline     Vitals:   T:           R:  16                BP: 121/62                 Sat:   98                P:   69    Mental Status:  _x___ Awake x  _____ Alert   _____ Drowsy   _____ Sedated    Nausea/Vomiting:  ____ NO  ______Yes,   See Post - Op Orders          Pain Scale (0-10):  _____    Treatment: ____ None    ____ See Post - Op/PCA Orders    Post - Operative Fluids:   ____ Oral   ____ See Post - Op Orders    Plan: Discharge:   ____Home x      _____Floor     _____Critical Care    _____  Other:_________________    Comments:

## 2021-07-28 NOTE — PROGRESS NOTE ADULT - ASSESSMENT
52yo M  pmhx HTN, DM2, Alcohol/opiate abuse presents to ED for chest pain, nausea/vomiting for past week.    Chest pain, nausea/vomiting  Possible due to alcohol gastritis, r/o gallstones  ACS ruled out, 3 negative troponins, can dc tele  CT Chest PE protocol (07/24): No central pulm embolus; hepatic steatosis)  RUQ US (07/25): hepatic steatosis  Discussed with GI  EGD -  Grade B esophagitis in the lower third of the esophagus compatible with nonspecific erosive esophagitis. Congestion and erythema in the stomach compatible with non-erosive gastritis. (Biopsy). Normal mucosa in the whole examined duodenum.   Follow-up visit in 2-4 weeks in the [facility]  Protonix 40 mg po daily    Hyperkalemia, mild  K 5.3 - hemolyzed - repeat in am     Alcohol withdrawal  Hx of substance abuse  Suspected thiamine deficiency  Suspected folate deficiency  Pancytopenia  - Monitor CIWA, LIbrium taper finishing today   - Ativan 2mg Iv q2h PRN for severe symptoms  - Suboxone 8-2mg BID ([facility] Reference #: [id number]). Pt states he attends program across the street from this hospital.   - Cont thiamine and folic acid  - blood count stable - work up never done   - will send vitamin b12, folate, hiv, hcv, retic count before discharge   - hem consulted for need for any further inpatient work up and outpatient follow up on discharge     DM2  Takes metformin at home  can start Lantus/Lispro if FS persistently >180    HTN  - holding lisinopril given hyper K  - Cont amlodipine 10mg qD    DVT PPX, improve score of 0 no need for chemical proph     #Progress Note Handoff  plan for discharge in am after hem eval

## 2021-07-29 ENCOUNTER — TRANSCRIPTION ENCOUNTER (OUTPATIENT)
Age: 51
End: 2021-07-29

## 2021-07-29 VITALS
HEART RATE: 81 BPM | TEMPERATURE: 99 F | RESPIRATION RATE: 16 BRPM | DIASTOLIC BLOOD PRESSURE: 80 MMHG | SYSTOLIC BLOOD PRESSURE: 129 MMHG

## 2021-07-29 LAB
ALBUMIN SERPL ELPH-MCNC: 3.8 G/DL — SIGNIFICANT CHANGE UP (ref 3.5–5.2)
ALP SERPL-CCNC: 108 U/L — SIGNIFICANT CHANGE UP (ref 30–115)
ALT FLD-CCNC: 52 U/L — HIGH (ref 0–41)
ANION GAP SERPL CALC-SCNC: 12 MMOL/L — SIGNIFICANT CHANGE UP (ref 7–14)
AST SERPL-CCNC: 123 U/L — HIGH (ref 0–41)
BASOPHILS # BLD AUTO: 0.02 K/UL — SIGNIFICANT CHANGE UP (ref 0–0.2)
BASOPHILS NFR BLD AUTO: 0.8 % — SIGNIFICANT CHANGE UP (ref 0–1)
BILIRUB SERPL-MCNC: 2.7 MG/DL — HIGH (ref 0.2–1.2)
BUN SERPL-MCNC: 10 MG/DL — SIGNIFICANT CHANGE UP (ref 10–20)
CALCIUM SERPL-MCNC: 9.5 MG/DL — SIGNIFICANT CHANGE UP (ref 8.5–10.1)
CHLORIDE SERPL-SCNC: 97 MMOL/L — LOW (ref 98–110)
CO2 SERPL-SCNC: 26 MMOL/L — SIGNIFICANT CHANGE UP (ref 17–32)
CREAT SERPL-MCNC: 0.7 MG/DL — SIGNIFICANT CHANGE UP (ref 0.7–1.5)
EOSINOPHIL # BLD AUTO: 0.04 K/UL — SIGNIFICANT CHANGE UP (ref 0–0.7)
EOSINOPHIL NFR BLD AUTO: 1.7 % — SIGNIFICANT CHANGE UP (ref 0–8)
FOLATE SERPL-MCNC: 18.4 NG/ML — SIGNIFICANT CHANGE UP
GLUCOSE BLDC GLUCOMTR-MCNC: 208 MG/DL — HIGH (ref 70–99)
GLUCOSE BLDC GLUCOMTR-MCNC: 223 MG/DL — HIGH (ref 70–99)
GLUCOSE BLDC GLUCOMTR-MCNC: 237 MG/DL — HIGH (ref 70–99)
GLUCOSE SERPL-MCNC: 215 MG/DL — HIGH (ref 70–99)
HAV IGM SER-ACNC: SIGNIFICANT CHANGE UP
HBV CORE IGM SER-ACNC: SIGNIFICANT CHANGE UP
HBV SURFACE AG SER-ACNC: SIGNIFICANT CHANGE UP
HCT VFR BLD CALC: 40.3 % — LOW (ref 42–52)
HCV AB S/CO SERPL IA: 0.18 S/CO — SIGNIFICANT CHANGE UP (ref 0–0.99)
HCV AB SERPL-IMP: SIGNIFICANT CHANGE UP
HGB BLD-MCNC: 13.5 G/DL — LOW (ref 14–18)
HIV 1+2 AB+HIV1 P24 AG SERPL QL IA: SIGNIFICANT CHANGE UP
IMM GRANULOCYTES NFR BLD AUTO: 0 % — LOW (ref 0.1–0.3)
LYMPHOCYTES # BLD AUTO: 1.02 K/UL — LOW (ref 1.2–3.4)
LYMPHOCYTES # BLD AUTO: 42.5 % — SIGNIFICANT CHANGE UP (ref 20.5–51.1)
MCHC RBC-ENTMCNC: 33.5 G/DL — SIGNIFICANT CHANGE UP (ref 32–37)
MCHC RBC-ENTMCNC: 33.6 PG — HIGH (ref 27–31)
MCV RBC AUTO: 100.2 FL — HIGH (ref 80–94)
MONOCYTES # BLD AUTO: 0.52 K/UL — SIGNIFICANT CHANGE UP (ref 0.1–0.6)
MONOCYTES NFR BLD AUTO: 21.7 % — HIGH (ref 1.7–9.3)
NEUTROPHILS # BLD AUTO: 0.8 K/UL — LOW (ref 1.4–6.5)
NEUTROPHILS NFR BLD AUTO: 33.3 % — LOW (ref 42.2–75.2)
NRBC # BLD: 0 /100 WBCS — SIGNIFICANT CHANGE UP (ref 0–0)
PLATELET # BLD AUTO: 62 K/UL — LOW (ref 130–400)
POTASSIUM SERPL-MCNC: 4.6 MMOL/L — SIGNIFICANT CHANGE UP (ref 3.5–5)
POTASSIUM SERPL-SCNC: 4.6 MMOL/L — SIGNIFICANT CHANGE UP (ref 3.5–5)
PROT SERPL-MCNC: 6.3 G/DL — SIGNIFICANT CHANGE UP (ref 6–8)
RBC # BLD: 4.02 M/UL — LOW (ref 4.7–6.1)
RBC # BLD: 4.02 M/UL — LOW (ref 4.7–6.1)
RBC # FLD: 12.7 % — SIGNIFICANT CHANGE UP (ref 11.5–14.5)
RETICS #: 148.3 K/UL — HIGH (ref 25–125)
RETICS/RBC NFR: 3.7 % — HIGH (ref 0.5–1.5)
SODIUM SERPL-SCNC: 135 MMOL/L — SIGNIFICANT CHANGE UP (ref 135–146)
SURGICAL PATHOLOGY STUDY: SIGNIFICANT CHANGE UP
VIT B12 SERPL-MCNC: 1937 PG/ML — HIGH (ref 232–1245)
WBC # BLD: 2.4 K/UL — LOW (ref 4.8–10.8)
WBC # FLD AUTO: 2.4 K/UL — LOW (ref 4.8–10.8)

## 2021-07-29 PROCEDURE — 99239 HOSP IP/OBS DSCHRG MGMT >30: CPT

## 2021-07-29 PROCEDURE — 99232 SBSQ HOSP IP/OBS MODERATE 35: CPT

## 2021-07-29 RX ORDER — THIAMINE MONONITRATE (VIT B1) 100 MG
1 TABLET ORAL
Qty: 30 | Refills: 0
Start: 2021-07-29 | End: 2021-08-27

## 2021-07-29 RX ADMIN — Medication 1 GRAM(S): at 05:25

## 2021-07-29 RX ADMIN — GABAPENTIN 300 MILLIGRAM(S): 400 CAPSULE ORAL at 05:25

## 2021-07-29 RX ADMIN — GABAPENTIN 300 MILLIGRAM(S): 400 CAPSULE ORAL at 13:01

## 2021-07-29 RX ADMIN — Medication 10 MILLIGRAM(S): at 05:25

## 2021-07-29 RX ADMIN — BUPRENORPHINE AND NALOXONE 1 TABLET(S): 2; .5 TABLET SUBLINGUAL at 05:24

## 2021-07-29 RX ADMIN — Medication 100 MILLIGRAM(S): at 12:09

## 2021-07-29 RX ADMIN — Medication 4: at 16:44

## 2021-07-29 RX ADMIN — Medication 4: at 08:15

## 2021-07-29 RX ADMIN — Medication 1 GRAM(S): at 12:09

## 2021-07-29 RX ADMIN — Medication 4: at 12:08

## 2021-07-29 RX ADMIN — Medication 1 GRAM(S): at 00:07

## 2021-07-29 RX ADMIN — Medication 25 MILLIGRAM(S): at 00:06

## 2021-07-29 RX ADMIN — Medication 10 MILLIGRAM(S): at 13:01

## 2021-07-29 RX ADMIN — Medication 1 MILLIGRAM(S): at 12:09

## 2021-07-29 RX ADMIN — Medication 30 MILLILITER(S): at 12:09

## 2021-07-29 NOTE — DISCHARGE NOTE NURSING/CASE MANAGEMENT/SOCIAL WORK - NSDCPEEMAIL_GEN_ALL_CORE
[facility] email [email or web address]

## 2021-07-29 NOTE — PROGRESS NOTE ADULT - ASSESSMENT
50 yo male with ETOH abuse with n/v and possible hematemesis    Problem 1-N/V possible hematemesis   likely with early liver cirrhosis   PLAN  s/p EGD yesterday  Impressions:    Grade B esophagitis in the lower third of the esophagus compatible with  nonspecific erosive esophagitis.    Congestion and erythema in the stomach compatible with non-erosive gastritis.  (Biopsy).    Normal mucosa in the whole examined duodenum.     Rec  -Advance diet as tolerated  -Follow-up visit in 2-4 weeks in the [facility]  -Await pathology results  -Protonix 40 mg po daily  -2 gm Na diet  -EGD before discharge  -Advised to STOP ETOH  -RUQ and AFP every 6 months, screening for varices     Problem 2-Hepatic steatosis.  -altered LFTS likely due to ETOH  Rec  -Monitor LFTs  -MDF<32 no steroids indicated   -MELD score 16-CBD 5mm no evidence of cholelithiasis   -Dietary and lifestyle modifications advised  -ETOH cessation advised  52 yo male with ETOH abuse with n/v and possible hematemesis    Problem 1-N/V possible hematemesis   likely with early liver cirrhosis   PLAN  s/p EGD yesterday  Impressions:    Grade B esophagitis in the lower third of the esophagus compatible with  nonspecific erosive esophagitis.    Congestion and erythema in the stomach compatible with non-erosive gastritis.  (Biopsy).    Normal mucosa in the whole examined duodenum.     Rec  -Advance diet as tolerated  -Follow-up visit in 2-4 weeks in the [facility]  -Await pathology results  -Protonix 40 mg po daily  -2 gm Na diet  -EGD before discharge  -Advised to STOP ETOH  -RUQ and AFP every 6 months, screening for varices     Problem 2-Hepatic steatosis.  -altered LFTS likely due to ETOH  Rec  -Monitor LFTs  -MDF<32 no steroids indicated   -MELD score 16-CBD 5mm no evidence of cholelithiasis   -Dietary and lifestyle modifications advised  -ETOH cessation advised     Recall GI if needed  50 yo male with ETOH abuse with n/v and possible hematemesis    Problem 1-N/V possible hematemesis   likely with early liver cirrhosis   PLAN  s/p EGD yesterday  Impressions:    Grade B esophagitis in the lower third of the esophagus compatible with  nonspecific erosive esophagitis.    Congestion and erythema in the stomach compatible with non-erosive gastritis.  (Biopsy).    Normal mucosa in the whole examined duodenum.     Rec  -Advance diet as tolerated  -Follow-up visit in 2-4 weeks in the [facility]  -Await pathology results  -Protonix 40 mg po daily  -2 gm Na diet  -EGD before discharge  -Advised to STOP ETOH  -RUQ and AFP every 6 months, screening for varices     Problem 2-Hepatic steatosis.  -altered LFTS likely due to ETOH  Rec  -Monitor LFTs  -MDF<32 no steroids indicated   -CBD 5mm no evidence of cholelithiasis   -Dietary and lifestyle modifications advised  -ETOH cessation advised     Recall GI if needed

## 2021-07-29 NOTE — PROGRESS NOTE ADULT - SUBJECTIVE AND OBJECTIVE BOX
GINO STEWART  51y, Male  Allergy: No Known Allergies    Hospital Day: 2d    Patient seen and examined earlier today. No acute events overnight.    PMH/PSH:  PAST MEDICAL & SURGICAL HISTORY:  HTN (hypertension)    DM (diabetes mellitus)    History of incision and drainage    VITALS:  T(F): 99.1 (07-26-21 @ 05:16), Max: 99.1 (07-26-21 @ 05:16)  HR: 97 (07-26-21 @ 05:16)  BP: 143/76 (07-26-21 @ 05:16) (136/93 - 155/94)  RR: 18 (07-26-21 @ 05:16)  SpO2: --    TESTS & MEASUREMENTS:  Weight (Kg): 126.5 (07-24-21 @ 17:30)  BMI (kg/m2): 36.8 (07-24)    07-25-21 @ 07:01  -  07-26-21 @ 07:00  --------------------------------------------------------  IN: 0 mL / OUT: 225 mL / NET: -225 mL                        14.6   2.20  )-----------( 34       ( 25 Jul 2021 06:32 )             43.2     PT/INR - ( 25 Jul 2021 06:32 )   PT: 14.90 sec;   INR: 1.30 ratio         PTT - ( 25 Jul 2021 06:32 )  PTT:32.7 sec  07-25    134<L>  |  93<L>  |  13  ----------------------------<  183<H>  4.2   |  29  |  0.6<L>    Ca    8.7      25 Jul 2021 06:32  Mg     1.5     07-24    TPro  6.5  /  Alb  3.7  /  TBili  2.3<H>  /  DBili  x   /  AST  163<H>  /  ALT  71<H>  /  AlkPhos  112  07-25    LIVER FUNCTIONS - ( 25 Jul 2021 06:32 )  Alb: 3.7 g/dL / Pro: 6.5 g/dL / ALK PHOS: 112 U/L / ALT: 71 U/L / AST: 163 U/L / GGT: x           CARDIAC MARKERS ( 25 Jul 2021 06:32 )  x     / <0.01 ng/mL / x     / x     / x      CARDIAC MARKERS ( 24 Jul 2021 19:30 )  x     / <0.01 ng/mL / x     / x     / x      CARDIAC MARKERS ( 24 Jul 2021 12:30 )  x     / <0.01 ng/mL / x     / x     / x        RECENT DIAGNOSTIC ORDERS:  Comprehensive Metabolic Panel: AM Sched. Collection: 26-Jul-2021 04:30 (07-25-21 @ 12:35)  Complete Blood Count: AM Sched. Collection: 26-Jul-2021 04:30 (07-25-21 @ 12:35)    MEDICATIONS:  MEDICATIONS  (STANDING):  baclofen 10 milliGRAM(s) Oral three times a day  buprenorphine 8 mG/naloxone 2 mG SL  Tablet 1 Tablet(s) SubLingual <User Schedule>  chlordiazePOXIDE   Oral   chlordiazePOXIDE 25 milliGRAM(s) Oral every 4 hours  chlordiazePOXIDE 20 milliGRAM(s) Oral every 4 hours  dextrose 40% Gel 15 Gram(s) Oral once  dextrose 5%. 1000 milliLiter(s) (50 mL/Hr) IV Continuous <Continuous>  dextrose 5%. 1000 milliLiter(s) (100 mL/Hr) IV Continuous <Continuous>  dextrose 50% Injectable 25 Gram(s) IV Push once  dextrose 50% Injectable 12.5 Gram(s) IV Push once  dextrose 50% Injectable 25 Gram(s) IV Push once  folic acid 1 milliGRAM(s) Oral daily  gabapentin 300 milliGRAM(s) Oral three times a day  glucagon  Injectable 1 milliGRAM(s) IntraMuscular once  insulin lispro (ADMELOG) corrective regimen sliding scale   SubCutaneous three times a day before meals  lisinopril 20 milliGRAM(s) Oral daily  sucralfate suspension 1 Gram(s) Oral four times a day  thiamine 100 milliGRAM(s) Oral daily    MEDICATIONS  (PRN):  aluminum hydroxide/magnesium hydroxide/simethicone Suspension 30 milliLiter(s) Oral every 4 hours PRN Dyspepsia  LORazepam   Injectable 2 milliGRAM(s) IV Push every 2 hours PRN Alcohol withdrawal  ondansetron Injectable 4 milliGRAM(s) IV Push four times a day PRN Nausea and/or Vomiting    HOME MEDICATIONS:  buprenorphine-naloxone 8 mg-2 mg sublingual tablet (11-08)    REVIEW OF SYSTEMS:  All other review of systems is negative unless indicated above.     PHYSICAL EXAM:  GENERAL: NAD  HEENT: No Swelling  CHEST/LUNG: Good air entry, No wheezing  HEART: RRR, No murmurs  ABDOMEN: Soft, Bowel sounds present  EXTREMITIES:  No clubbing      
51yMale  Being followed for hematemesis   Interval history: Patient denies nausea, vomiting, hematemesis, melena, blood in stool, diarrhea, constipation, abdominal pain. Patient hungry for food, tolerating low sodium diet.      PAST MEDICAL & SURGICAL HISTORY:   HTN (hypertension)    DM (diabetes mellitus)    History of incision and drainage            Social History: No smoking. +ETOH alcohol. No illegal drug use.            MEDICATIONS  (STANDING):  baclofen 10 milliGRAM(s) Oral three times a day  buprenorphine 8 mG/naloxone 2 mG SL  Tablet 1 Tablet(s) SubLingual <User Schedule>  dextrose 40% Gel 15 Gram(s) Oral once  dextrose 5%. 1000 milliLiter(s) (50 mL/Hr) IV Continuous <Continuous>  dextrose 5%. 1000 milliLiter(s) (100 mL/Hr) IV Continuous <Continuous>  dextrose 50% Injectable 25 Gram(s) IV Push once  dextrose 50% Injectable 12.5 Gram(s) IV Push once  dextrose 50% Injectable 25 Gram(s) IV Push once  folic acid 1 milliGRAM(s) Oral daily  gabapentin 300 milliGRAM(s) Oral three times a day  glucagon  Injectable 1 milliGRAM(s) IntraMuscular once  insulin lispro (ADMELOG) corrective regimen sliding scale   SubCutaneous three times a day before meals  sucralfate suspension 1 Gram(s) Oral four times a day  thiamine 100 milliGRAM(s) Oral daily    MEDICATIONS  (PRN):  aluminum hydroxide/magnesium hydroxide/simethicone Suspension 30 milliLiter(s) Oral every 4 hours PRN Dyspepsia  LORazepam   Injectable 2 milliGRAM(s) IV Push every 2 hours PRN Alcohol withdrawal  ondansetron Injectable 4 milliGRAM(s) IV Push four times a day PRN Nausea and/or Vomiting      Allergies:   No Known Allergies              REVIEW OF SYSTEMS:  General:  No weight loss, fevers, or chills.  Eyes:  No reported pain or visual changes  ENT:  No sore throat or runny nose.  NECK: No stiffness   CV:  No chest pain or palpitations.  Resp:  No shortness of breath, cough  GI:  No abdominal pain, nausea, vomiting, dysphagia, diarrhea or constipation. No rectal bleeding, melena, or hematemesis.  Neuro:  No tingling, numbness           VITAL SIGNS:   T(F): 98.6 (07-27-21 @ 05:20), Max: 98.6 (07-27-21 @ 05:20)  HR: 72 (07-27-21 @ 05:20) (72 - 91)  BP: 126/56 (07-27-21 @ 05:20) (126/56 - 153/82)  RR: 18 (07-27-21 @ 05:20) (18 - 18)  SpO2: --    PHYSICAL EXAM:  GENERAL: AAOx3, no acute distress.  HEAD:  Atraumatic, Normocephalic  EYES: conjunctiva and sclera clear  NECK: Supple, no JVD or thyromegaly  CHEST/LUNG: Clear to auscultation bilaterally; No wheeze, rhonchi, or rales  HEART: Regular rate and rhythm; normal S1, S2, No murmurs.  ABDOMEN: Soft, nontender, nondistended; Bowel sounds present  NEUROLOGY: No asterixis or tremor.   SKIN: Intact, no jaundice            LABS:                        13.9   2.82  )-----------( 46       ( 27 Jul 2021 06:37 )             41.2     07-27    135  |  98  |  12  ----------------------------<  203<H>  5.4<H>   |  31  |  0.8    Ca    9.8      27 Jul 2021 06:37    TPro  6.0  /  Alb  3.7  /  TBili  3.1<H>  /  DBili  x   /  AST  107<H>  /  ALT  50<H>  /  AlkPhos  105  07-27    LIVER FUNCTIONS - ( 27 Jul 2021 06:37 )  Alb: 3.7 g/dL / Pro: 6.0 g/dL / ALK PHOS: 105 U/L / ALT: 50 U/L / AST: 107 U/L / GGT: x               IMAGING:    < from: CT Angio Chest PE Protocol w/ IV Cont (07.24.21 @ 15:02) >    EXAM:  CT ANGIO CHEST PULM ART [facility]            PROCEDURE DATE:  07/24/2021            INTERPRETATION:  CLINICAL STATEMENT: Chest pain.    TECHNIQUE: Multislice helical sections were obtained from the thoracic inlet to the lung bases during rapid administration of 94cc Optiray 320 intravenous contrast using a CTA protocol. Thin sections were reconstructed through the pulmonary vasculature. Sagittal and coronal reformatted images were acquired, as well as MIP reconstructed images.    COMPARISON CT: CT angiogram of the chest 10/11/2019.    OTHER STUDIES USED FOR CORRELATION: None.      FINDINGS:    PULMONARY EMBOLUS: Limited study showing no central embolus; cannot evaluate for segmental or subsegmental pulmonary emboli.    LUNGS: No lobar consolidation, pleural effusion or pneumothorax. Bilateral subsegmental atelectasis.    PLEURA/PERICARDIUM: Heart is within normal limits for size. No pericardial effusion. No coronary artery or aortic calcification.    MEDIASTINUM/THORACIC NODES: No mediastinal or axillary lymphadenopathy.    VISUALIZED UPPER ABDOMEN: Hepatic steatosis.    BONES/SOFT TISSUES: No acute osseous abnormality.      IMPRESSION:  No central pulmonary embolus.  Hepatic steatosis.    --- End of Report ---            OMER LAKHANI MD; Resident Radiologist  This document has been electronically signed.  LENARD JOYCE MD; Attending Radiologist  This document has been electronically signed. Jul 24 2021  3:52PM    < end of copied text >    < from: US Abdomen Limited (07.24.21 @ 18:11) >    EXAM:  US ABDOMEN LIMITED            PROCEDURE DATE:  07/24/2021            INTERPRETATION:  Clinical History / Reason for exam: Transaminitis.    COMPARISON: October 13, 2019.    PROCEDURE: Ultrasound of the right upper quadrant was performed.    FINDINGS:    LIVER: Diffusely increased echogenic parenchyma, enlarged measuring 25 cm in length. No focal mass.    GALLBLADDER/BILIARY TREE:  No evidence of cholelithiasis. No wall thickening or pericholecystic fluid. Negative sonographic Jimenez's sign. No intrahepatic biliary ductal dilatation. The common bile duct measures 5 mm, which is normal.    PANCREAS: Obscured by overlying bowel gas.    KIDNEY:  Right kidney measures 11.5 cm in length. No hydronephrosis, calculi or solid mass.    AORTA/IVC:  Visualized proximal portions unremarkable.    ASCITES:  Trace perihepatic ascites.    IMPRESSION:    Hepatomegaly and diffuse hepatic steatosis.    Trace perihepatic ascites.    --- End of Report ---              ELLIOT LANDAU MD; Attending Radiologist  This document has been electronically signed. Jul 25 2021 12:33PM    < end of copied text >        
51yMale  Being followed for hematemesis  Interval history: Patient denies nausea, vomiting, hematemesis, melena, blood in stool, diarrhea, constipation, abdominal pain. Patient tolerating consistent carbohydrate diet, no snacks.      PAST MEDICAL & SURGICAL HISTORY:  HTN (hypertension)    DM (diabetes mellitus)    History of incision and drainage             Social History: No smoking. +alcohol. No illegal drug use.            MEDICATIONS  (STANDING):  baclofen 10 milliGRAM(s) Oral three times a day  buprenorphine 8 mG/naloxone 2 mG SL  Tablet 1 Tablet(s) SubLingual <User Schedule>  folic acid 1 milliGRAM(s) Oral daily  gabapentin 300 milliGRAM(s) Oral three times a day  insulin lispro (ADMELOG) corrective regimen sliding scale   SubCutaneous three times a day before meals  sucralfate suspension 1 Gram(s) Oral four times a day  thiamine 100 milliGRAM(s) Oral daily    MEDICATIONS  (PRN):  aluminum hydroxide/magnesium hydroxide/simethicone Suspension 30 milliLiter(s) Oral every 4 hours PRN Dyspepsia  LORazepam   Injectable 2 milliGRAM(s) IV Push every 2 hours PRN Alcohol withdrawal  ondansetron Injectable 4 milliGRAM(s) IV Push four times a day PRN Nausea and/or Vomiting      Allergies:   No Known Allergies          REVIEW OF SYSTEMS:  General:  No weight loss, fevers, or chills.  Eyes:  No reported pain or visual changes  ENT:  No sore throat or runny nose.  NECK: No stiffness   CV:  No chest pain or palpitations.  Resp:  No shortness of breath, cough  GI:  No abdominal pain, nausea, vomiting, dysphagia, diarrhea or constipation. No rectal bleeding, melena, or hematemesis.  Neuro:  No tingling, numbness         VITAL SIGNS:   T(F): 97.8 (21 @ 05:39), Max: 98.1 (21 @ 13:28)  HR: 69 (21 @ 05:39) (69 - 77)  BP: 150/75 (21 @ 05:39) (119/84 - 153/93)  RR: 16 (21 @ 05:39) (16 - 16)  SpO2: --    PHYSICAL EXAM:  GENERAL: AAOx3, no acute distress.  HEAD:  Atraumatic, Normocephalic  EYES: conjunctiva and sclera clear  NECK: Supple, no JVD or thyromegaly  CHEST/LUNG: Clear to auscultation bilaterally; No wheeze, rhonchi, or rales  HEART: Regular rate and rhythm; normal S1, S2, No murmurs.  ABDOMEN: Soft, nontender, nondistended; Bowel sounds present  NEUROLOGY: No asterixis or tremor.   SKIN: Intact, no jaundice            LABS:                        13.5   2.40  )-----------( 62       ( 2021 06:47 )             40.3         135  |  97<L>  |  10  ----------------------------<  215<H>  4.6   |  26  |  0.7    Ca    9.5      2021 06:47  Mg     1.7         TPro  6.3  /  Alb  3.8  /  TBili  2.7<H>  /  DBili  x   /  AST  123<H>  /  ALT  52<H>  /  AlkPhos  108      LIVER FUNCTIONS - ( 2021 06:47 )  Alb: 3.8 g/dL / Pro: 6.3 g/dL / ALK PHOS: 108 U/L / ALT: 52 U/L / AST: 123 U/L / GGT: x           PT/INR - ( 2021 06:52 )   PT: 16.50 sec;   INR: 1.43 ratio         PTT - ( 2021 06:52 )  PTT:35.7 sec    IMAGING:      < from: CT Angio Chest PE Protocol w/ IV Cont (21 @ 15:02) >    EXAM:  CT ANGIO CHEST PULM ART [facility]            PROCEDURE DATE:  2021            INTERPRETATION:  CLINICAL STATEMENT: Chest pain.    TECHNIQUE: Multislice helical sections were obtained from the thoracic inlet to the lung bases during rapid administration of 94cc Optiray 320 intravenous contrast using a CTA protocol. Thin sections were reconstructed through the pulmonary vasculature. Sagittal and coronal reformatted images were acquired, as well as MIP reconstructed images.    COMPARISON CT: CT angiogram of the chest 10/11/2019.    OTHER STUDIES USED FOR CORRELATION: None.      FINDINGS:    PULMONARY EMBOLUS: Limited study showing no central embolus; cannot evaluate for segmental or subsegmental pulmonary emboli.    LUNGS: No lobar consolidation, pleural effusion or pneumothorax. Bilateral subsegmental atelectasis.    PLEURA/PERICARDIUM: Heart is within normal limits for size. No pericardial effusion. No coronary artery or aortic calcification.    MEDIASTINUM/THORACIC NODES: No mediastinal or axillary lymphadenopathy.    VISUALIZED UPPER ABDOMEN: Hepatic steatosis.    BONES/SOFT TISSUES: No acute osseous abnormality.      IMPRESSION:  No central pulmonary embolus.  Hepatic steatosis.    --- End of Report ---            OMER LAKHANI MD; Resident Radiologist  This document has been electronically signed.  LENARD JOYCE MD; Attending Radiologist  This document has been electronically signed. 2021  3:52PM    < end of copied text >      < from: US Abdomen Limited (21 @ 18:11) >    EXAM:  US ABDOMEN LIMITED            PROCEDURE DATE:  2021            INTERPRETATION:  Clinical History / Reason for exam: Transaminitis.    COMPARISON: 2019.    PROCEDURE: Ultrasound of the right upper quadrant was performed.    FINDINGS:    LIVER: Diffusely increased echogenic parenchyma, enlarged measuring 25 cm in length. No focal mass.    GALLBLADDER/BILIARY TREE:  No evidence of cholelithiasis. No wall thickening or pericholecystic fluid. Negative sonographic Jimenez's sign. No intrahepatic biliary ductal dilatation. The common bile duct measures 5 mm, which is normal.    PANCREAS: Obscured by overlying bowel gas.    KIDNEY:  Right kidney measures 11.5 cm in length. No hydronephrosis, calculi or solid mass.    AORTA/IVC:  Visualized proximal portions unremarkable.    ASCITES:  Trace perihepatic ascites.    IMPRESSION:    Hepatomegaly and diffuse hepatic steatosis.    Trace perihepatic ascites.    --- End of Report ---              ELLIOT LANDAU MD; Attending Radiologist  This document has been electronically signed. 2021 12:33PM    < end of copied text >      < from: EGD (21 @ 09:45) >    EGD Report Date: 2021 9:45 AM   Patient Name: GINO STEWRAT   MRN: [id number]   Account Number: [id number]  Gender: Male    (age): 1970 (51)   Instrument(s): GIF-H190 ([id number])([id number])    Attending/Fellow:   Brandon Yost MD       Procedure Room #: [facility]    Referring Physician:   CATALINO RAMOS  [address] [place] [id number]  [phone] (phone)  [phone] (fax)       Nurse(s):   padilla Aguirre RN       ASA Class: P3 - 2021 12:39 PM Brandon Yost MD         History of Present Illness:   H&P was previously reviewed.       Administered Medications: As per Anesthesiology Record     Indications: hematemesis  Procedure:   The procedure, indications, preparation and potentialcomplications were  explained to the patient, who indicated understanding and signed the  corresponding consent forms. MAC was administered by anesthesiologist.  Continuous pulse oximetry and blood pressure monitoring were used throughout the  procedure. Supplemental oxygen was used. Patient was placed in the left lateral  decubitus position. The endoscope was introduced through the mouth and advanced  under direct visualization until the second part of the duodenum was reached.  Patient tolerance to the procedure was good. The procedure was not difficult.  Blood loss was minimal.      Limitations/Complications: There were no apparent limitations or complications  Findings:   Esophagus Mucosa Grade B esophagitis was seen in the lower third ofthe  esophagus, compatible with nonspecific erosive esophagitis.   Stomach Mucosa Diffuse continuous congestion and erythema of the mucosa with no  bleeding was noted in the stomach. These findings are compatible with  non-erosive gastritis. Multiplecold forceps biopsies were performed for  histology.   Duodenum Mucosa Normal mucosa was noted in the whole examined duodenum.                          Impressions:    Grade B esophagitis in the lower third of the esophagus compatible with  nonspecific erosive esophagitis.    Congestion and erythema in the stomach compatible with non-erosive gastritis.  (Biopsy).    Normal mucosa in the whole examined duodenum.     Plan: Advance diet as tolerated  Follow-up visit in 2-4 weeks in the [facility]  Await pathology results  Protonix 40 mg po daily         Brandon Yost MD    Version 1, Electronically signed on 2021 12:42:10 PM by Brandon Yost MD    < end of copied text >      
51yMale  Being followed for n/v/ possible hematemesis   Interval history: Patient denies nausea, vomiting, hematemesis, melena, blood in stool, diarrhea, constipation, abdominal pain. Patient on dtr protocol, tolerating consistent carbohydrate diet no snacks.      PAST MEDICAL & SURGICAL HISTORY:   HTN (hypertension)    DM (diabetes mellitus)    History of incision and drainage            Social History: No smoking. + alcohol. No illegal drug use.            MEDICATIONS  (STANDING):  baclofen 10 milliGRAM(s) Oral three times a day  buprenorphine 8 mG/naloxone 2 mG SL  Tablet 1 Tablet(s) SubLingual <User Schedule>  chlordiazePOXIDE   Oral   chlordiazePOXIDE 25 milliGRAM(s) Oral every 4 hours  chlordiazePOXIDE 20 milliGRAM(s) Oral every 4 hours  dextrose 40% Gel 15 Gram(s) Oral once  dextrose 5%. 1000 milliLiter(s) (50 mL/Hr) IV Continuous <Continuous>  dextrose 5%. 1000 milliLiter(s) (100 mL/Hr) IV Continuous <Continuous>  dextrose 50% Injectable 25 Gram(s) IV Push once  dextrose 50% Injectable 12.5 Gram(s) IV Push once  dextrose 50% Injectable 25 Gram(s) IV Push once  folic acid 1 milliGRAM(s) Oral daily  gabapentin 300 milliGRAM(s) Oral three times a day  glucagon  Injectable 1 milliGRAM(s) IntraMuscular once  insulin lispro (ADMELOG) corrective regimen sliding scale   SubCutaneous three times a day before meals  lisinopril 20 milliGRAM(s) Oral daily  sucralfate suspension 1 Gram(s) Oral four times a day  thiamine 100 milliGRAM(s) Oral daily    MEDICATIONS  (PRN):  aluminum hydroxide/magnesium hydroxide/simethicone Suspension 30 milliLiter(s) Oral every 4 hours PRN Dyspepsia  LORazepam   Injectable 2 milliGRAM(s) IV Push every 2 hours PRN Alcohol withdrawal  ondansetron Injectable 4 milliGRAM(s) IV Push four times a day PRN Nausea and/or Vomiting      Allergies:   No Known Allergies          REVIEW OF SYSTEMS:  General:  No weight loss, fevers, or chills.  Eyes:  No reported pain or visual changes  ENT:  No sore throat or runny nose.  NECK: No stiffness   CV:  No chest pain or palpitations.  Resp:  No shortness of breath, cough  GI:  No abdominal pain, nausea, vomiting, dysphagia, diarrhea or constipation. No rectal bleeding, melena, or hematemesis.  Neuro:  No tingling, numbness           VITAL SIGNS:   T(F): 99.1 (07-26-21 @ 05:16), Max: 99.1 (07-26-21 @ 05:16)  HR: 97 (07-26-21 @ 05:16) (73 - 97)  BP: 143/76 (07-26-21 @ 05:16) (136/93 - 155/94)  RR: 18 (07-26-21 @ 05:16) (18 - 18)  SpO2: --    PHYSICAL EXAM:  GENERAL: AAOx3, no acute distress.  HEAD:  Atraumatic, Normocephalic  EYES: conjunctiva and sclera clear  NECK: Supple, no JVD or thyromegaly  CHEST/LUNG: Clear to auscultation bilaterally; No wheeze, rhonchi, or rales  HEART: Regular rate and rhythm; normal S1, S2, No murmurs.  ABDOMEN: Soft, nontender, nondistended; Bowel sounds present  NEUROLOGY: No asterixis or tremor.   SKIN: Intact, no jaundice            LABS:                        14.4   2.75  )-----------( 39       ( 26 Jul 2021 07:46 )             43.1     07-26    135  |  96<L>  |  10  ----------------------------<  179<H>  5.1<H>   |  26  |  0.7    Ca    9.2      26 Jul 2021 07:46  Mg     1.5     07-24    TPro  6.7  /  Alb  4.0  /  TBili  3.0<H>  /  DBili  x   /  AST  147<H>  /  ALT  65<H>  /  AlkPhos  112  07-26    LIVER FUNCTIONS - ( 26 Jul 2021 07:46 )  Alb: 4.0 g/dL / Pro: 6.7 g/dL / ALK PHOS: 112 U/L / ALT: 65 U/L / AST: 147 U/L / GGT: x           PT/INR - ( 25 Jul 2021 06:32 )   PT: 14.90 sec;   INR: 1.30 ratio         PTT - ( 25 Jul 2021 06:32 )  PTT:32.7 sec    IMAGING:    < from: CT Angio Chest PE Protocol w/ IV Cont (07.24.21 @ 15:02) >    EXAM:  CT ANGIO CHEST PULM ART [facility]            PROCEDURE DATE:  07/24/2021            INTERPRETATION:  CLINICAL STATEMENT: Chest pain.    TECHNIQUE: Multislice helical sections were obtained from the thoracic inlet to the lung bases during rapid administration of 94cc Optiray 320 intravenous contrast using a CTA protocol. Thin sections were reconstructed through the pulmonary vasculature. Sagittal and coronal reformatted images were acquired, as well as MIP reconstructed images.    COMPARISON CT: CT angiogram of the chest 10/11/2019.    OTHER STUDIES USED FOR CORRELATION: None.      FINDINGS:    PULMONARY EMBOLUS: Limited study showing no central embolus; cannot evaluate for segmental or subsegmental pulmonary emboli.    LUNGS: No lobar consolidation, pleural effusion or pneumothorax. Bilateral subsegmental atelectasis.    PLEURA/PERICARDIUM: Heart is within normal limits for size. No pericardial effusion. No coronary artery or aortic calcification.    MEDIASTINUM/THORACIC NODES: No mediastinal or axillary lymphadenopathy.    VISUALIZED UPPER ABDOMEN: Hepatic steatosis.    BONES/SOFT TISSUES: No acute osseous abnormality.      IMPRESSION:  No central pulmonary embolus.  Hepatic steatosis.    --- End of Report ---            OMER LAKHANI MD; Resident Radiologist  This document has been electronically signed.  LENARD JOYCE MD; Attending Radiologist  This document has been electronically signed. Jul 24 2021  3:52PM    < end of copied text >      < from: US Abdomen Limited (07.24.21 @ 18:11) >    EXAM:  US ABDOMEN LIMITED            PROCEDURE DATE:  07/24/2021            INTERPRETATION:  Clinical History / Reason for exam: Transaminitis.    COMPARISON: October 13, 2019.    PROCEDURE: Ultrasound of the right upper quadrant was performed.    FINDINGS:    LIVER: Diffusely increased echogenic parenchyma, enlarged measuring 25 cm in length. No focal mass.    GALLBLADDER/BILIARY TREE:  No evidence of cholelithiasis. No wall thickening or pericholecystic fluid. Negative sonographic Jimenez's sign. No intrahepatic biliary ductal dilatation. The common bile duct measures 5 mm, which is normal.    PANCREAS: Obscured by overlying bowel gas.    KIDNEY:  Right kidney measures 11.5 cm in length. No hydronephrosis, calculi or solid mass.    AORTA/IVC:  Visualized proximal portions unremarkable.    ASCITES:  Trace perihepatic ascites.    IMPRESSION:    Hepatomegaly and diffuse hepatic steatosis.    Trace perihepatic ascites.    --- End of Report ---              ELLIOT LANDAU MD; Attending Radiologist  This document has been electronically signed. Jul 25 2021 12:33PM    < end of copied text >        
  GINO STEWART  51y, Male  Allergy: No Known Allergies    Hospital Day: 1d    Patient seen and examined earlier today. No acute events overnight.    PMH/PSH:  PAST MEDICAL & SURGICAL HISTORY:  HTN (hypertension)    DM (diabetes mellitus)    History of incision and drainage    VITALS:  T(F): 96.7 (07-25-21 @ 05:31), Max: 98.8 (07-24-21 @ 12:24)  HR: 73 (07-25-21 @ 05:31)  BP: 194/101 (07-25-21 @ 05:31) (163/74 - 196/93)  RR: 18 (07-25-21 @ 05:31)  SpO2: 97% (07-24-21 @ 16:26)    TESTS & MEASUREMENTS:  Weight (Kg): 126.5 (07-24-21 @ 17:30)  BMI (kg/m2): 36.8 (07-24)                        14.6   2.20  )-----------( 34       ( 25 Jul 2021 06:32 )             43.2     PT/INR - ( 25 Jul 2021 06:32 )   PT: 14.90 sec;   INR: 1.30 ratio         PTT - ( 25 Jul 2021 06:32 )  PTT:32.7 sec  07-25    134<L>  |  93<L>  |  13  ----------------------------<  183<H>  4.2   |  29  |  0.6<L>    Ca    8.7      25 Jul 2021 06:32  Mg     1.5     07-24    TPro  6.5  /  Alb  3.7  /  TBili  2.3<H>  /  DBili  x   /  AST  163<H>  /  ALT  71<H>  /  AlkPhos  112  07-25    LIVER FUNCTIONS - ( 25 Jul 2021 06:32 )  Alb: 3.7 g/dL / Pro: 6.5 g/dL / ALK PHOS: 112 U/L / ALT: 71 U/L / AST: 163 U/L / GGT: x           CARDIAC MARKERS ( 25 Jul 2021 06:32 )  x     / <0.01 ng/mL / x     / x     / x      CARDIAC MARKERS ( 24 Jul 2021 19:30 )  x     / <0.01 ng/mL / x     / x     / x      CARDIAC MARKERS ( 24 Jul 2021 12:30 )  x     / <0.01 ng/mL / x     / x     / x        RECENT DIAGNOSTIC ORDERS:  COVID-19 Wesley Domain Antibody: AM Sched. Collection: 25-Jul-2021 04:30 (07-24-21 @ 20:07)  Diet, Consistent Carbohydrate/No Snacks (07-24-21 @ 16:46)  A1C with Estimated Average Glucose: AM Sched. Collection: 25-Jul-2021 04:30 (07-24-21 @ 16:43)  TTE Echo Complete w/o Contrast w/ Doppler:   Transport: Wheelchair  Monitor: w/o Monitor (07-24-21 @ 16:37)  US Abdomen Limited: Routine   Indication: Transaminitis  Transport: Wheelchair  Exam Completed (07-24-21 @ 16:30)    MEDICATIONS:  MEDICATIONS  (STANDING):  amLODIPine   Tablet 10 milliGRAM(s) Oral once  buprenorphine 8 mG/naloxone 2 mG SL  Tablet 1 Tablet(s) SubLingual <User Schedule>  chlordiazePOXIDE   Oral   chlordiazePOXIDE 50 milliGRAM(s) Oral every 4 hours  chlordiazePOXIDE 25 milliGRAM(s) Oral every 4 hours  dextrose 40% Gel 15 Gram(s) Oral once  dextrose 5%. 1000 milliLiter(s) (50 mL/Hr) IV Continuous <Continuous>  dextrose 5%. 1000 milliLiter(s) (100 mL/Hr) IV Continuous <Continuous>  dextrose 50% Injectable 25 Gram(s) IV Push once  dextrose 50% Injectable 12.5 Gram(s) IV Push once  dextrose 50% Injectable 25 Gram(s) IV Push once  folic acid 1 milliGRAM(s) Oral daily  gabapentin 300 milliGRAM(s) Oral three times a day  glucagon  Injectable 1 milliGRAM(s) IntraMuscular once  insulin lispro (ADMELOG) corrective regimen sliding scale   SubCutaneous three times a day before meals  lisinopril 20 milliGRAM(s) Oral daily  thiamine 100 milliGRAM(s) Oral daily    MEDICATIONS  (PRN):  LORazepam   Injectable 2 milliGRAM(s) IV Push every 2 hours PRN Alcohol withdrawal  ondansetron Injectable 4 milliGRAM(s) IV Push four times a day PRN Nausea and/or Vomiting    HOME MEDICATIONS:  buprenorphine-naloxone 8 mg-2 mg sublingual tablet (11-08)    REVIEW OF SYSTEMS:  All other review of systems is negative unless indicated above.     PHYSICAL EXAM:  GENERAL: NAD  HEENT: No Swelling  CHEST/LUNG: Good air entry, No wheezing  HEART: RRR, No murmurs  ABDOMEN: Soft, Bowel sounds present  EXTREMITIES:  No clubbing      
GINO STEWART  51y Male    CHIEF COMPLAINT:    Patient is a 51y old  Male who presents with a chief complaint of Nausea/vomiting (25 Jul 2021 15:03)      INTERVAL HPI/OVERNIGHT EVENTS:    Patient seen and examined. No acute events overnight. No evidence of withdrawal     ROS: All other systems are negative.    Vital Signs:    T(F): 98.6 (07-27-21 @ 05:20), Max: 98.6 (07-27-21 @ 05:20)  HR: 72 (07-27-21 @ 05:20) (72 - 91)  BP: 126/56 (07-27-21 @ 05:20) (126/56 - 153/82)  RR: 18 (07-27-21 @ 05:20) (18 - 18)    POCT Blood Glucose.: 174 mg/dL (27 Jul 2021 07:29)  POCT Blood Glucose.: 227 mg/dL (26 Jul 2021 21:13)  POCT Blood Glucose.: 174 mg/dL (26 Jul 2021 16:24)  POCT Blood Glucose.: 261 mg/dL (26 Jul 2021 11:12)    PHYSICAL EXAM:    GENERAL:  NAD  SKIN: No rashes or lesions  HEENT: Atraumatic. Normocephalic.    NECK: Supple, No JVD. .  PULMONARY: CTA B/L. No wheezing. No rales  CVS: Normal S1, S2. Rate and Rhythm are regular.   ABDOMEN/GI: Soft, Nontender, Nondistended; BS present  MSK:  No edema B/L LE. No clubbing or cyanosis   NEUROLOGIC:  No motor or sensory deficit  PSYCH: Alert & oriented x 3, normal affect    Consultant(s) Notes Reviewed:  [x ] YES  [ ] NO  Care Discussed with Consultants/Other Providers [ x] YES  [ ] NO    LABS:                        13.9   2.82  )-----------( 46       ( 27 Jul 2021 06:37 )             41.2     135  |  98  |  12  ----------------------------<  203<H>  5.4<H>   |  31  |  0.8    Ca    9.8      27 Jul 2021 06:37    TPro  6.0  /  Alb  3.7  /  TBili  3.1<H>  /  DBili  x   /  AST  107<H>  /  ALT  50<H>  /  AlkPhos  105  07-27    Trop <0.01, CKMB --, CK --, 07-25-21 @ 06:32  Trop <0.01, CKMB --, CK --, 07-24-21 @ 19:30  Trop <0.01, CKMB --, CK --, 07-24-21 @ 12:30    RADIOLOGY & ADDITIONAL TESTS:  Imaging or report Personally Reviewed:  [x] YES  [ ] NO  EKG reviewed: [x] YES  [ ] NO    Medications:  Standing  baclofen 10 milliGRAM(s) Oral three times a day  buprenorphine 8 mG/naloxone 2 mG SL  Tablet 1 Tablet(s) SubLingual <User Schedule>  dextrose 40% Gel 15 Gram(s) Oral once  dextrose 5%. 1000 milliLiter(s) IV Continuous <Continuous>  dextrose 5%. 1000 milliLiter(s) IV Continuous <Continuous>  dextrose 50% Injectable 25 Gram(s) IV Push once  dextrose 50% Injectable 12.5 Gram(s) IV Push once  dextrose 50% Injectable 25 Gram(s) IV Push once  folic acid 1 milliGRAM(s) Oral daily  gabapentin 300 milliGRAM(s) Oral three times a day  glucagon  Injectable 1 milliGRAM(s) IntraMuscular once  insulin lispro (ADMELOG) corrective regimen sliding scale   SubCutaneous three times a day before meals  lisinopril 20 milliGRAM(s) Oral daily  sucralfate suspension 1 Gram(s) Oral four times a day  thiamine 100 milliGRAM(s) Oral daily    PRN Meds  aluminum hydroxide/magnesium hydroxide/simethicone Suspension 30 milliLiter(s) Oral every 4 hours PRN  LORazepam   Injectable 2 milliGRAM(s) IV Push every 2 hours PRN  ondansetron Injectable 4 milliGRAM(s) IV Push four times a day PRN            
Patient is a 51y old  Male who presents with a chief complaint of Nausea/vomiting (25 Jul 2021 15:03)      INTERVAL HPI/OVERNIGHT EVENTS: no acute overnight events noted   comfortably in bed     REVIEW OF SYSTEMS: negative  Vital Signs Last 24 Hrs  T(C): 36.7 (28 Jul 2021 13:28), Max: 36.9 (28 Jul 2021 05:00)  T(F): 98.1 (28 Jul 2021 13:28), Max: 98.4 (28 Jul 2021 05:00)  HR: 69 (28 Jul 2021 13:28) (69 - 93)  BP: 119/84 (28 Jul 2021 13:28) (115/59 - 142/93)  BP(mean): --  RR: 16 (28 Jul 2021 13:28) (16 - 18)  SpO2: --    PHYSICAL EXAM:   NAD; Normocephalic;   LUNGS - no wheezing  HEART: S1 S2+   ABDOMEN: Soft, Nontender, non distended  EXTREMITIES: no cyanosis; no edema  NERVOUS SYSTEM:  Awake and alert; no focal neuro deficits appreciated    LABS:                        14.4   2.96  )-----------( 59       ( 28 Jul 2021 06:52 )             43.9     07-28    137  |  99  |  12  ----------------------------<  222<H>  5.3<H>   |  27  |  0.6<L>    Ca    9.9      28 Jul 2021 06:52  Mg     1.7     07-28    TPro  6.5  /  Alb  3.8  /  TBili  2.7<H>  /  DBili  x   /  AST  110<H>  /  ALT  52<H>  /  AlkPhos  113  07-28    PT/INR - ( 28 Jul 2021 06:52 )   PT: 16.50 sec;   INR: 1.43 ratio         PTT - ( 28 Jul 2021 06:52 )  PTT:35.7 sec    CAPILLARY BLOOD GLUCOSE      POCT Blood Glucose.: 194 mg/dL (28 Jul 2021 07:42)  POCT Blood Glucose.: 205 mg/dL (27 Jul 2021 21:07)  POCT Blood Glucose.: 232 mg/dL (27 Jul 2021 16:26)      Medications:  MEDICATIONS  (STANDING):  baclofen 10 milliGRAM(s) Oral three times a day  buprenorphine 8 mG/naloxone 2 mG SL  Tablet 1 Tablet(s) SubLingual <User Schedule>  folic acid 1 milliGRAM(s) Oral daily  gabapentin 300 milliGRAM(s) Oral three times a day  insulin lispro (ADMELOG) corrective regimen sliding scale   SubCutaneous three times a day before meals  sucralfate suspension 1 Gram(s) Oral four times a day  thiamine 100 milliGRAM(s) Oral daily    MEDICATIONS  (PRN):  aluminum hydroxide/magnesium hydroxide/simethicone Suspension 30 milliLiter(s) Oral every 4 hours PRN Dyspepsia  LORazepam   Injectable 2 milliGRAM(s) IV Push every 2 hours PRN Alcohol withdrawal  ondansetron Injectable 4 milliGRAM(s) IV Push four times a day PRN Nausea and/or Vomiting

## 2021-07-29 NOTE — DISCHARGE NOTE NURSING/CASE MANAGEMENT/SOCIAL WORK - NSDCPEWEB_GEN_ALL_CORE
[facility] for Tobacco Control website --- [email or web address] --- [email or web address]

## 2021-07-29 NOTE — DISCHARGE NOTE NURSING/CASE MANAGEMENT/SOCIAL WORK - PATIENT PORTAL LINK FT
You can access the FollowMyHealth Patient Portal offered by [facility] by registering at the following website: [email or web address]. By joining [facility]’s FollowMyHealth portal, you will also be able to view your health information using other applications (apps) compatible with our system.

## 2021-07-29 NOTE — PROGRESS NOTE ADULT - ATTENDING COMMENTS
50 yo male in DTs with history of hematemesis  Rec  full detox regimen  follow cbc  EGD when detox protocol complete, prior to dc
Patient with questionable hematemesis. States he was coughing. Hemodynamically stable. Hb stable  F/u as outpatient.
S/P EGd showed esophagitis. F/U as outpatient

## 2021-07-29 NOTE — DISCHARGE NOTE NURSING/CASE MANAGEMENT/SOCIAL WORK - NSDCVIVACCINE_GEN_ALL_CORE_FT
influenza, injectable, quadrivalent, preservative free; 17-Oct-2019 12:50; oRse Oliveros (RN); GlaxCommuniCliqueine; 3bs44 (Exp. Date: 30-Jun-2020); IntraMuscular; Deltoid Left.; 0.5 milliLiter(s); VIS (VIS Published: 15-Aug-2019, VIS Presented: 17-Oct-2019);   Tdap; 11-Oct-2019 08:07; Clark Rodriguez); [employer]; [id number] (Exp. Date: 22-Oct-2021); IntraMuscular; Deltoid Left.; 0.5 milliLiter(s); VIS (VIS Published: 09-May-2013, VIS Presented: 11-Oct-2019);

## 2021-07-30 LAB
CA TITR SERPL: SIGNIFICANT CHANGE UP
IGG SERPL-MCNC: 958 MG/DL — SIGNIFICANT CHANGE UP (ref 603–1613)
IGG1 SER-MCNC: 441 MG/DL — SIGNIFICANT CHANGE UP (ref 248–810)
IGG2 SER-MCNC: 258 MG/DL — SIGNIFICANT CHANGE UP (ref 130–555)
IGG3 SER-MCNC: 50 MG/DL — SIGNIFICANT CHANGE UP (ref 15–102)
IGG4 SER-MCNC: 54 MG/DL — SIGNIFICANT CHANGE UP (ref 2–96)

## 2021-08-09 DIAGNOSIS — K70.0 ALCOHOLIC FATTY LIVER: ICD-10-CM

## 2021-08-09 DIAGNOSIS — F11.20 OPIOID DEPENDENCE, UNCOMPLICATED: ICD-10-CM

## 2021-08-09 DIAGNOSIS — Z79.84 LONG TERM (CURRENT) USE OF ORAL HYPOGLYCEMIC DRUGS: ICD-10-CM

## 2021-08-09 DIAGNOSIS — D69.6 THROMBOCYTOPENIA, UNSPECIFIED: ICD-10-CM

## 2021-08-09 DIAGNOSIS — E87.5 HYPERKALEMIA: ICD-10-CM

## 2021-08-09 DIAGNOSIS — F10.239 ALCOHOL DEPENDENCE WITH WITHDRAWAL, UNSPECIFIED: ICD-10-CM

## 2021-08-09 DIAGNOSIS — K20.90 ESOPHAGITIS, UNSPECIFIED WITHOUT BLEEDING: ICD-10-CM

## 2021-08-09 DIAGNOSIS — D61.818 OTHER PANCYTOPENIA: ICD-10-CM

## 2021-08-09 DIAGNOSIS — E53.8 DEFICIENCY OF OTHER SPECIFIED B GROUP VITAMINS: ICD-10-CM

## 2021-08-09 DIAGNOSIS — E51.9 THIAMINE DEFICIENCY, UNSPECIFIED: ICD-10-CM

## 2021-08-09 DIAGNOSIS — F10.231 ALCOHOL DEPENDENCE WITH WITHDRAWAL DELIRIUM: ICD-10-CM

## 2021-08-09 DIAGNOSIS — E11.9 TYPE 2 DIABETES MELLITUS WITHOUT COMPLICATIONS: ICD-10-CM

## 2021-08-09 DIAGNOSIS — K29.20 ALCOHOLIC GASTRITIS WITHOUT BLEEDING: ICD-10-CM

## 2021-08-09 DIAGNOSIS — K70.11 ALCOHOLIC HEPATITIS WITH ASCITES: ICD-10-CM

## 2021-08-09 DIAGNOSIS — I10 ESSENTIAL (PRIMARY) HYPERTENSION: ICD-10-CM

## 2021-08-09 DIAGNOSIS — K70.31 ALCOHOLIC CIRRHOSIS OF LIVER WITH ASCITES: ICD-10-CM

## 2021-08-09 DIAGNOSIS — Z87.891 PERSONAL HISTORY OF NICOTINE DEPENDENCE: ICD-10-CM

## 2021-08-14 ENCOUNTER — INPATIENT (INPATIENT)
Facility: HOSPITAL | Age: 51
LOS: 4 days | Discharge: HOME | End: 2021-08-19
Attending: INTERNAL MEDICINE | Admitting: INTERNAL MEDICINE
Payer: MEDICAID

## 2021-08-14 VITALS
WEIGHT: 268.08 LBS | SYSTOLIC BLOOD PRESSURE: 171 MMHG | HEIGHT: 73 IN | HEART RATE: 89 BPM | TEMPERATURE: 98 F | RESPIRATION RATE: 18 BRPM | DIASTOLIC BLOOD PRESSURE: 95 MMHG

## 2021-08-14 DIAGNOSIS — Z98.890 OTHER SPECIFIED POSTPROCEDURAL STATES: Chronic | ICD-10-CM

## 2021-08-14 LAB
ALBUMIN SERPL ELPH-MCNC: 3.3 G/DL — LOW (ref 3.5–5.2)
ALP SERPL-CCNC: 87 U/L — SIGNIFICANT CHANGE UP (ref 30–115)
ALT FLD-CCNC: 26 U/L — SIGNIFICANT CHANGE UP (ref 0–41)
ANION GAP SERPL CALC-SCNC: 8 MMOL/L — SIGNIFICANT CHANGE UP (ref 7–14)
AST SERPL-CCNC: 85 U/L — HIGH (ref 0–41)
BASOPHILS # BLD AUTO: 0.01 K/UL — SIGNIFICANT CHANGE UP (ref 0–0.2)
BASOPHILS NFR BLD AUTO: 0.3 % — SIGNIFICANT CHANGE UP (ref 0–1)
BILIRUB SERPL-MCNC: 0.9 MG/DL — SIGNIFICANT CHANGE UP (ref 0.2–1.2)
BUN SERPL-MCNC: 11 MG/DL — SIGNIFICANT CHANGE UP (ref 10–20)
CALCIUM SERPL-MCNC: 8.5 MG/DL — SIGNIFICANT CHANGE UP (ref 8.5–10.1)
CHLORIDE SERPL-SCNC: 102 MMOL/L — SIGNIFICANT CHANGE UP (ref 98–110)
CO2 SERPL-SCNC: 32 MMOL/L — SIGNIFICANT CHANGE UP (ref 17–32)
CREAT SERPL-MCNC: 0.6 MG/DL — LOW (ref 0.7–1.5)
EOSINOPHIL # BLD AUTO: 0.05 K/UL — SIGNIFICANT CHANGE UP (ref 0–0.7)
EOSINOPHIL NFR BLD AUTO: 1.6 % — SIGNIFICANT CHANGE UP (ref 0–8)
GLUCOSE SERPL-MCNC: 325 MG/DL — HIGH (ref 70–99)
HCT VFR BLD CALC: 39.4 % — LOW (ref 42–52)
HGB BLD-MCNC: 12.9 G/DL — LOW (ref 14–18)
IMM GRANULOCYTES NFR BLD AUTO: 0 % — LOW (ref 0.1–0.3)
LYMPHOCYTES # BLD AUTO: 1.09 K/UL — LOW (ref 1.2–3.4)
LYMPHOCYTES # BLD AUTO: 35.7 % — SIGNIFICANT CHANGE UP (ref 20.5–51.1)
MCHC RBC-ENTMCNC: 32.7 G/DL — SIGNIFICANT CHANGE UP (ref 32–37)
MCHC RBC-ENTMCNC: 32.7 PG — HIGH (ref 27–31)
MCV RBC AUTO: 100 FL — HIGH (ref 80–94)
MONOCYTES # BLD AUTO: 0.33 K/UL — SIGNIFICANT CHANGE UP (ref 0.1–0.6)
MONOCYTES NFR BLD AUTO: 10.8 % — HIGH (ref 1.7–9.3)
NEUTROPHILS # BLD AUTO: 1.57 K/UL — SIGNIFICANT CHANGE UP (ref 1.4–6.5)
NEUTROPHILS NFR BLD AUTO: 51.6 % — SIGNIFICANT CHANGE UP (ref 42.2–75.2)
NRBC # BLD: 0 /100 WBCS — SIGNIFICANT CHANGE UP (ref 0–0)
PLATELET # BLD AUTO: 51 K/UL — LOW (ref 130–400)
POTASSIUM SERPL-MCNC: 4.8 MMOL/L — SIGNIFICANT CHANGE UP (ref 3.5–5)
POTASSIUM SERPL-SCNC: 4.8 MMOL/L — SIGNIFICANT CHANGE UP (ref 3.5–5)
PROT SERPL-MCNC: 6.1 G/DL — SIGNIFICANT CHANGE UP (ref 6–8)
RAPID RVP RESULT: SIGNIFICANT CHANGE UP
RBC # BLD: 3.94 M/UL — LOW (ref 4.7–6.1)
RBC # FLD: 12.5 % — SIGNIFICANT CHANGE UP (ref 11.5–14.5)
SARS-COV-2 RNA SPEC QL NAA+PROBE: SIGNIFICANT CHANGE UP
SODIUM SERPL-SCNC: 142 MMOL/L — SIGNIFICANT CHANGE UP (ref 135–146)
WBC # BLD: 3.05 K/UL — LOW (ref 4.8–10.8)
WBC # FLD AUTO: 3.05 K/UL — LOW (ref 4.8–10.8)

## 2021-08-14 PROCEDURE — 71045 X-RAY EXAM CHEST 1 VIEW: CPT | Mod: 26

## 2021-08-14 PROCEDURE — 93970 EXTREMITY STUDY: CPT | Mod: 26

## 2021-08-14 PROCEDURE — 99223 1ST HOSP IP/OBS HIGH 75: CPT

## 2021-08-14 PROCEDURE — 99285 EMERGENCY DEPT VISIT HI MDM: CPT

## 2021-08-14 RX ORDER — METRONIDAZOLE 500 MG
500 TABLET ORAL ONCE
Refills: 0 | Status: COMPLETED | OUTPATIENT
Start: 2021-08-14 | End: 2021-08-14

## 2021-08-14 RX ORDER — VANCOMYCIN HCL 1 G
1000 VIAL (EA) INTRAVENOUS ONCE
Refills: 0 | Status: COMPLETED | OUTPATIENT
Start: 2021-08-14 | End: 2021-08-14

## 2021-08-14 RX ORDER — LISINOPRIL 2.5 MG/1
10 TABLET ORAL DAILY
Refills: 0 | Status: DISCONTINUED | OUTPATIENT
Start: 2021-08-14 | End: 2021-08-19

## 2021-08-14 RX ORDER — GABAPENTIN 400 MG/1
300 CAPSULE ORAL THREE TIMES A DAY
Refills: 0 | Status: DISCONTINUED | OUTPATIENT
Start: 2021-08-14 | End: 2021-08-19

## 2021-08-14 RX ORDER — CEFEPIME 1 G/1
2000 INJECTION, POWDER, FOR SOLUTION INTRAMUSCULAR; INTRAVENOUS ONCE
Refills: 0 | Status: COMPLETED | OUTPATIENT
Start: 2021-08-14 | End: 2021-08-14

## 2021-08-14 RX ORDER — VANCOMYCIN HCL 1 G
1000 VIAL (EA) INTRAVENOUS
Refills: 0 | Status: DISCONTINUED | OUTPATIENT
Start: 2021-08-15 | End: 2021-08-17

## 2021-08-14 RX ORDER — ENOXAPARIN SODIUM 100 MG/ML
40 INJECTION SUBCUTANEOUS DAILY
Refills: 0 | Status: DISCONTINUED | OUTPATIENT
Start: 2021-08-16 | End: 2021-08-19

## 2021-08-14 RX ORDER — ZINC SULFATE TAB 220 MG (50 MG ZINC EQUIVALENT) 220 (50 ZN) MG
220 TAB ORAL DAILY
Refills: 0 | Status: DISCONTINUED | OUTPATIENT
Start: 2021-08-14 | End: 2021-08-19

## 2021-08-14 RX ORDER — THIAMINE MONONITRATE (VIT B1) 100 MG
100 TABLET ORAL DAILY
Refills: 0 | Status: DISCONTINUED | OUTPATIENT
Start: 2021-08-14 | End: 2021-08-19

## 2021-08-14 RX ORDER — FOLIC ACID 0.8 MG
1 TABLET ORAL DAILY
Refills: 0 | Status: DISCONTINUED | OUTPATIENT
Start: 2021-08-14 | End: 2021-08-19

## 2021-08-14 RX ORDER — NICOTINE POLACRILEX 2 MG
1 GUM BUCCAL DAILY
Refills: 0 | Status: DISCONTINUED | OUTPATIENT
Start: 2021-08-14 | End: 2021-08-19

## 2021-08-14 RX ORDER — BUPRENORPHINE AND NALOXONE 2; .5 MG/1; MG/1
1 TABLET SUBLINGUAL
Refills: 0 | Status: DISCONTINUED | OUTPATIENT
Start: 2021-08-14 | End: 2021-08-19

## 2021-08-14 RX ADMIN — Medication 100 MILLIGRAM(S): at 20:23

## 2021-08-14 RX ADMIN — Medication 2 MILLIGRAM(S): at 22:48

## 2021-08-14 RX ADMIN — Medication 250 MILLIGRAM(S): at 21:56

## 2021-08-14 RX ADMIN — CEFEPIME 100 MILLIGRAM(S): 1 INJECTION, POWDER, FOR SOLUTION INTRAMUSCULAR; INTRAVENOUS at 20:59

## 2021-08-14 NOTE — ED PROVIDER NOTE - CLINICAL SUMMARY MEDICAL DECISION MAKING FREE TEXT BOX
alcoholic poorly controlled diabetic with venous stasis/abrasions with cellulitis for admission, stable for floor

## 2021-08-14 NOTE — ED PROVIDER NOTE - NS ED ROS FT
Constitutional: (-) fever  Eyes/ENT: (-) blurry vision, (-) epistaxis  Cardiovascular: (-) chest pain, (-) syncope  Respiratory: (-) cough, (-) shortness of breath  Gastrointestinal: (-) vomiting, (-) diarrhea  Gu: (-) dysuria, (-) hematuria  Musculoskeletal: (-) neck pain, (-) back pain, (-) joint pain  Integumentary: (+) redness, (+) swelling, (+) dc  Neurological: (-) headache, (-) altered mental status  Allergic/Immunologic: (-) pruritus

## 2021-08-14 NOTE — ED ADULT NURSE NOTE - NSIMPLEMENTINTERV_GEN_ALL_ED
Implemented All Fall with Harm Risk Interventions:  [place] to call system. Call bell, personal items and telephone within reach. Instruct patient to call for assistance. Room bathroom lighting operational. Non-slip footwear when patient is off stretcher. Physically safe environment: no spills, clutter or unnecessary equipment. Stretcher in lowest position, wheels locked, appropriate side rails in place. Provide visual cue, wrist band, yellow gown, etc. Monitor gait and stability. Monitor for mental status changes and reorient to person, place, and time. Review medications for side effects contributing to fall risk. Reinforce activity limits and safety measures with patient and family. Provide visual clues: red socks.

## 2021-08-14 NOTE — H&P ADULT - HISTORY OF PRESENT ILLNESS
50yo male whose PMH includes DM, HTN and substance abuse (on Suboxone uses alcohol, uses tobacco)  50yo male whose PMH includes DM, neuropathy,  HTN and substance abuse (on Suboxone uses alcohol, uses tobacco) presents to ER due to developing redness to both legs. These findings have begun following a fall 1 week ago resulting in abrasions.. Denies fevers. Also asking for Covid vaccine  52yo male whose PMH includes DM, neuropathy,  HTN and substance abuse (on Suboxone uses alcohol, uses tobacco) presents to ER due to developing redness to both legs. Associated with a burning pain sensation.  Denies fevers. These findings began about 1 week ago after a fall which resulted in abrasions to both legs. Also asking for Covid vaccine

## 2021-08-14 NOTE — ED PROVIDER NOTE - PHYSICAL EXAMINATION
CONST: NAD  EYES: Sclera and conjunctiva clear.   ENT: No nasal discharge. Oropharynx normal appearing  NECK: Non-tender, no meningeal signs. normal ROM. supple   CARD: S1 S2; No jvd  RESP: Equal BS B/L, No wheezes, rhonchi or rales. No distress  GI: Soft, non-tender, non-distended. no cva tenderness. normal BS  MS: Normal ROM in all extremities. pulses 2 +.   SKIN: Erythema, edema and purulent dc from b/l legs R>L  NEURO: A&Ox3, No focal deficits. Strength 5/5 with no sensory deficits. Steady gait.

## 2021-08-14 NOTE — H&P ADULT - ASSESSMENT
Bilateral leg cellulitis    Tobacco abuse counselling     alcohol dependence    Request for Covid vaccine - will ask day staff to address as I cannot schedule second dose of pfizer vaccine at this time    Suboxone use     Neuropathy    DM- hold metformin for now    HTN- on lisinopril Bilateral leg cellulitis    Tobacco abuse counselling     alcohol dependence    Request for Covid vaccine - will ask day staff to address as I cannot schedule second dose of pfizer vaccine at this time    Suboxone use     Neuropathy    DM- hold metformin for now    HTN- on lisinopril    Evidence for Zinc, thiamine, and folic acid deficiency  Bilateral leg cellulitis - for now continue vancomycin, Cefepime and flagyl (started by ER) pending ID review    Tobacco abuse counselling - nicotine patch ordered    alcohol dependence- will start ativan protocol and ask addiction medicine to see    Request for Covid vaccine - will ask day staff to address as I cannot schedule second dose of Pfizer vaccine at this time    Suboxone use -ordered per patient's directions, would try to confirm when possible     Neuropathy- Neurontin     DM- hold metformin for now and monitor FSBS    HTN- on lisinopril    Evidence for Zinc (recently started on the), thiamine, and folic acid deficiency

## 2021-08-14 NOTE — ED ADULT NURSE NOTE - HOW OFTEN DO YOU HAVE SIX OR MORE DRINKS ON ONE OCCASION?
Physical Therapy Rehab Treatment Note  [facility]  [facility]       NAME: Fidencio Dumont  : 1952 (75 y.o.)  MRN: [id number]  CODE STATUS: Full Code    Date of Service: 2019  Chart Reviewed: Yes  Patient assessed for rehabilitation services?: Yes  Family / Caregiver Present: No  Diagnosis: Gait abnormality due to exacerbation of Parkinson's disease with acute rehab admission on 2019    Restrictions:  Restrictions/Precautions: Fall Risk       SUBJECTIVE: Subjective: A little pain  Response To Previous Treatment: Patient with no complaints from previous session. Pain Screening  Patient Currently in Pain: Yes  Pre Treatment Pain Screening  Pain at present: 3  Scale Used: Numeric Score  Intervention List: Patient able to continue with treatment  Comments / Details: back, hip, ribs    Post Treatment Pain Screening:  Pain Assessment  Pain Assessment: 0-10  Pain Level: 2  Pain Location: Back;Rib cage  Pain Orientation: Lower  Pain Descriptors: Aching  Clinical Progression: Gradually worsening  Functional Pain Assessment: Activities are not prevented  Non-Pharmaceutical Pain Intervention(s): Heat applied  Response to Pain Intervention: Patient Satisfied    OBJECTIVE:   Follows Commands: Within Functional Limits      Outcomes Measures:  Timed Up and Go: 17       Transfers  Sit to Stand: Supervision  Stand to sit: Supervision  Bed to Chair: Supervision  Car Transfer: Supervision  Comment: Patient shows improved hand placement without verbal cues    Ambulation  Ambulation?: Yes  More Ambulation?: Yes  Ambulation 1  Surface: carpet;level tile  Device: Rollator  Assistance: Stand by assistance  Quality of Gait:  NBOS shuffling gait pattern improved with verbal cues  Gait Deviations: Slow Erica; Shuffles;Decreased step height;Decreased step length  Distance: 150' x 2    Stairs/Curb  Stairs?: Yes   Stairs  # Steps : 12  Stairs Height: 6\"  Rails: Bilateral  Device: No Device  Assistance: Stand by assistance  Comment: Reciprocal pattern ascending, non-reciprocal descending    Exercises  Hamstring Sets: x 20 RTB  Hip Flexion: x20  Hip Abduction: x 20 RTB/ Ball squeezes  Knee Long Arc Quad: x20  Ankle Pumps: x20  Comments: Hot pack applied to low back during exercises     ASSESSMENT/COMMENTS:  Body structures, Functions, Activity limitations: Decreased functional mobility ; Decreased strength;Decreased endurance;Decreased coordination;Decreased balance; Increased Pain;Decreased safe awareness;Decreased ADL status; Decreased cognition  Assessment: Patient shows improved safety with transfers, completes without cues for hand placement. Patient completes TUG in 17 seconds    PLAN OF CARE/Safety:   Safety Devices  Type of devices:  All fall risk precautions in place      Therapy Time:   Individual   Time In 1000   Time Out 1100   Minutes 60     Minutes:60      Transfer/Bed mobility training: 15      Gait trainin     Therapeutic ex: 2907 Memo Fung PTA, 19 at 10:57 AM Four or more times a week

## 2021-08-14 NOTE — H&P ADULT - NSHPLABSRESULTS_GEN_ALL_CORE
12.9   3.05  )-----------( 51       ( 14 Aug 2021 18:39 )             39.4     08-14    142  |  102  |  11  ----------------------------<  325<H>  4.8   |  32  |  0.6<L>    Ca    8.5      14 Aug 2021 18:39    TPro  6.1  /  Alb  3.3<L>  /  TBili  0.9  /  DBili  x   /  AST  85<H>  /  ALT  26  /  AlkPhos  87  08-14              Lactate Trend        CAPILLARY BLOOD GLUCOSE

## 2021-08-14 NOTE — ED PROVIDER NOTE - OBJECTIVE STATEMENT
51y M pmh DM, HTN, substance abuse presents for eval of b/l wounds. Pt states he fell x1wk ago sustaining abrasion to b/l LE, since developed redness, swelling and dc. Associated mild burning pain, no aggravating or relieving factors. Denies fever, ha, cp, sob, weakness, numbness, dysuria, hematuria, n/v/d/c

## 2021-08-15 LAB
ALBUMIN SERPL ELPH-MCNC: 3.2 G/DL — LOW (ref 3.5–5.2)
ALP SERPL-CCNC: 78 U/L — SIGNIFICANT CHANGE UP (ref 30–115)
ALT FLD-CCNC: 22 U/L — SIGNIFICANT CHANGE UP (ref 0–41)
ANION GAP SERPL CALC-SCNC: 10 MMOL/L — SIGNIFICANT CHANGE UP (ref 7–14)
AST SERPL-CCNC: 70 U/L — HIGH (ref 0–41)
BILIRUB SERPL-MCNC: 0.9 MG/DL — SIGNIFICANT CHANGE UP (ref 0.2–1.2)
BUN SERPL-MCNC: 12 MG/DL — SIGNIFICANT CHANGE UP (ref 10–20)
CALCIUM SERPL-MCNC: 7.9 MG/DL — LOW (ref 8.5–10.1)
CHLORIDE SERPL-SCNC: 99 MMOL/L — SIGNIFICANT CHANGE UP (ref 98–110)
CO2 SERPL-SCNC: 25 MMOL/L — SIGNIFICANT CHANGE UP (ref 17–32)
COVID-19 SPIKE DOMAIN AB INTERP: POSITIVE
COVID-19 SPIKE DOMAIN ANTIBODY RESULT: >250 U/ML — HIGH
CREAT SERPL-MCNC: 0.5 MG/DL — LOW (ref 0.7–1.5)
GLUCOSE BLDC GLUCOMTR-MCNC: 189 MG/DL — HIGH (ref 70–99)
GLUCOSE BLDC GLUCOMTR-MCNC: 229 MG/DL — HIGH (ref 70–99)
GLUCOSE BLDC GLUCOMTR-MCNC: 257 MG/DL — HIGH (ref 70–99)
GLUCOSE BLDC GLUCOMTR-MCNC: 316 MG/DL — HIGH (ref 70–99)
GLUCOSE SERPL-MCNC: 294 MG/DL — HIGH (ref 70–99)
HCT VFR BLD CALC: 36.2 % — LOW (ref 42–52)
HGB BLD-MCNC: 11.8 G/DL — LOW (ref 14–18)
LACTATE SERPL-SCNC: 1.8 MMOL/L — SIGNIFICANT CHANGE UP (ref 0.7–2)
MCHC RBC-ENTMCNC: 32.4 PG — HIGH (ref 27–31)
MCHC RBC-ENTMCNC: 32.6 G/DL — SIGNIFICANT CHANGE UP (ref 32–37)
MCV RBC AUTO: 99.5 FL — HIGH (ref 80–94)
NRBC # BLD: 0 /100 WBCS — SIGNIFICANT CHANGE UP (ref 0–0)
PLATELET # BLD AUTO: 39 K/UL — LOW (ref 130–400)
POTASSIUM SERPL-MCNC: 4.7 MMOL/L — SIGNIFICANT CHANGE UP (ref 3.5–5)
POTASSIUM SERPL-SCNC: 4.7 MMOL/L — SIGNIFICANT CHANGE UP (ref 3.5–5)
PROT SERPL-MCNC: 5.6 G/DL — LOW (ref 6–8)
RBC # BLD: 3.64 M/UL — LOW (ref 4.7–6.1)
RBC # FLD: 12.3 % — SIGNIFICANT CHANGE UP (ref 11.5–14.5)
SARS-COV-2 IGG+IGM SERPL QL IA: >250 U/ML — HIGH
SARS-COV-2 IGG+IGM SERPL QL IA: POSITIVE
SODIUM SERPL-SCNC: 134 MMOL/L — LOW (ref 135–146)
WBC # BLD: 2.26 K/UL — LOW (ref 4.8–10.8)
WBC # FLD AUTO: 2.26 K/UL — LOW (ref 4.8–10.8)

## 2021-08-15 PROCEDURE — 99232 SBSQ HOSP IP/OBS MODERATE 35: CPT

## 2021-08-15 RX ORDER — INSULIN LISPRO 100/ML
VIAL (ML) SUBCUTANEOUS AT BEDTIME
Refills: 0 | Status: DISCONTINUED | OUTPATIENT
Start: 2021-08-15 | End: 2021-08-19

## 2021-08-15 RX ORDER — SODIUM CHLORIDE 9 MG/ML
1000 INJECTION, SOLUTION INTRAVENOUS
Refills: 0 | Status: DISCONTINUED | OUTPATIENT
Start: 2021-08-15 | End: 2021-08-19

## 2021-08-15 RX ORDER — CEFEPIME 1 G/1
1000 INJECTION, POWDER, FOR SOLUTION INTRAMUSCULAR; INTRAVENOUS EVERY 12 HOURS
Refills: 0 | Status: DISCONTINUED | OUTPATIENT
Start: 2021-08-15 | End: 2021-08-16

## 2021-08-15 RX ORDER — GLUCAGON INJECTION, SOLUTION 0.5 MG/.1ML
1 INJECTION, SOLUTION SUBCUTANEOUS ONCE
Refills: 0 | Status: DISCONTINUED | OUTPATIENT
Start: 2021-08-15 | End: 2021-08-19

## 2021-08-15 RX ORDER — INSULIN LISPRO 100/ML
VIAL (ML) SUBCUTANEOUS
Refills: 0 | Status: DISCONTINUED | OUTPATIENT
Start: 2021-08-15 | End: 2021-08-19

## 2021-08-15 RX ORDER — LABETALOL HCL 100 MG
10 TABLET ORAL ONCE
Refills: 0 | Status: COMPLETED | OUTPATIENT
Start: 2021-08-15 | End: 2021-08-15

## 2021-08-15 RX ORDER — SIMETHICONE 80 MG/1
80 TABLET, CHEWABLE ORAL
Refills: 0 | Status: DISCONTINUED | OUTPATIENT
Start: 2021-08-15 | End: 2021-08-19

## 2021-08-15 RX ORDER — DEXTROSE 50 % IN WATER 50 %
15 SYRINGE (ML) INTRAVENOUS ONCE
Refills: 0 | Status: DISCONTINUED | OUTPATIENT
Start: 2021-08-15 | End: 2021-08-19

## 2021-08-15 RX ORDER — DEXTROSE 50 % IN WATER 50 %
25 SYRINGE (ML) INTRAVENOUS ONCE
Refills: 0 | Status: DISCONTINUED | OUTPATIENT
Start: 2021-08-15 | End: 2021-08-19

## 2021-08-15 RX ORDER — METRONIDAZOLE 500 MG
500 TABLET ORAL EVERY 8 HOURS
Refills: 0 | Status: DISCONTINUED | OUTPATIENT
Start: 2021-08-15 | End: 2021-08-16

## 2021-08-15 RX ORDER — PANTOPRAZOLE SODIUM 20 MG/1
40 TABLET, DELAYED RELEASE ORAL
Refills: 0 | Status: DISCONTINUED | OUTPATIENT
Start: 2021-08-15 | End: 2021-08-19

## 2021-08-15 RX ORDER — AMLODIPINE BESYLATE 2.5 MG/1
5 TABLET ORAL ONCE
Refills: 0 | Status: COMPLETED | OUTPATIENT
Start: 2021-08-15 | End: 2021-08-15

## 2021-08-15 RX ORDER — DEXTROSE 50 % IN WATER 50 %
12.5 SYRINGE (ML) INTRAVENOUS ONCE
Refills: 0 | Status: DISCONTINUED | OUTPATIENT
Start: 2021-08-15 | End: 2021-08-19

## 2021-08-15 RX ADMIN — Medication 10 MILLIGRAM(S): at 22:08

## 2021-08-15 RX ADMIN — BUPRENORPHINE AND NALOXONE 1 TABLET(S): 2; .5 TABLET SUBLINGUAL at 19:53

## 2021-08-15 RX ADMIN — GABAPENTIN 300 MILLIGRAM(S): 400 CAPSULE ORAL at 21:32

## 2021-08-15 RX ADMIN — Medication 250 MILLIGRAM(S): at 05:20

## 2021-08-15 RX ADMIN — Medication 30 MILLILITER(S): at 17:46

## 2021-08-15 RX ADMIN — AMLODIPINE BESYLATE 5 MILLIGRAM(S): 2.5 TABLET ORAL at 22:08

## 2021-08-15 RX ADMIN — GABAPENTIN 300 MILLIGRAM(S): 400 CAPSULE ORAL at 05:22

## 2021-08-15 RX ADMIN — Medication 250 MILLIGRAM(S): at 17:13

## 2021-08-15 RX ADMIN — Medication 100 MILLIGRAM(S): at 05:21

## 2021-08-15 RX ADMIN — Medication 1 PATCH: at 19:54

## 2021-08-15 RX ADMIN — Medication 1.5 MILLIGRAM(S): at 21:31

## 2021-08-15 RX ADMIN — Medication 2 MILLIGRAM(S): at 17:10

## 2021-08-15 RX ADMIN — BUPRENORPHINE AND NALOXONE 1 TABLET(S): 2; .5 TABLET SUBLINGUAL at 08:21

## 2021-08-15 RX ADMIN — Medication 1 PATCH: at 11:51

## 2021-08-15 RX ADMIN — LISINOPRIL 10 MILLIGRAM(S): 2.5 TABLET ORAL at 05:22

## 2021-08-15 RX ADMIN — Medication 2 MILLIGRAM(S): at 14:17

## 2021-08-15 RX ADMIN — Medication 100 MILLIGRAM(S): at 11:22

## 2021-08-15 RX ADMIN — GABAPENTIN 300 MILLIGRAM(S): 400 CAPSULE ORAL at 14:17

## 2021-08-15 RX ADMIN — GABAPENTIN 300 MILLIGRAM(S): 400 CAPSULE ORAL at 00:58

## 2021-08-15 RX ADMIN — CEFEPIME 100 MILLIGRAM(S): 1 INJECTION, POWDER, FOR SOLUTION INTRAMUSCULAR; INTRAVENOUS at 05:21

## 2021-08-15 RX ADMIN — Medication 100 MILLIGRAM(S): at 14:17

## 2021-08-15 RX ADMIN — Medication 4: at 16:49

## 2021-08-15 RX ADMIN — PANTOPRAZOLE SODIUM 40 MILLIGRAM(S): 20 TABLET, DELAYED RELEASE ORAL at 16:50

## 2021-08-15 RX ADMIN — Medication 1 MILLIGRAM(S): at 11:22

## 2021-08-15 RX ADMIN — Medication 2 MILLIGRAM(S): at 06:27

## 2021-08-15 RX ADMIN — Medication 2 MILLIGRAM(S): at 10:46

## 2021-08-15 RX ADMIN — Medication 2 MILLIGRAM(S): at 03:15

## 2021-08-15 RX ADMIN — CEFEPIME 100 MILLIGRAM(S): 1 INJECTION, POWDER, FOR SOLUTION INTRAMUSCULAR; INTRAVENOUS at 17:10

## 2021-08-15 RX ADMIN — ZINC SULFATE TAB 220 MG (50 MG ZINC EQUIVALENT) 220 MILLIGRAM(S): 220 (50 ZN) TAB at 11:23

## 2021-08-15 RX ADMIN — Medication 100 MILLIGRAM(S): at 21:31

## 2021-08-15 NOTE — PROGRESS NOTE ADULT - SUBJECTIVE AND OBJECTIVE BOX
The chart has been reviewed    # LE purulent cellulitis  # Substance abuse      would recommend:    1. Continue Cefepime and IV Vancomycin until work up is done  2. Less likely need Flagyl  3. Blood cultures X 2 for completeness       - Full  consult to follow     Thank you

## 2021-08-15 NOTE — PROGRESS NOTE ADULT - ASSESSMENT
50yo male whose PMH includes DM, neuropathy,  HTN and substance abuse (on Suboxone uses alcohol, uses tobacco) presents to ER due to developing redness to both legs. 50yo male whose PMH includes DM, neuropathy,  HTN and substance abuse (on Suboxone uses alcohol, uses tobacco) presents to ER due to developing redness to both legs.    B/L LE Cellulitis:  -Continue Vanco, Cefepime, Flagyl.  De escalate as per clinical improvement  -Follow Blood Cx.     52yo male whose PMH includes DM, neuropathy,  HTN and substance abuse (on Suboxone uses alcohol, uses tobacco) presents to ER due to developing redness to both legs.    B/L LE Cellulitis:  -Continue Vanco, Cefepime, Flagyl.  De escalate as per clinical improvement  -Follow Blood Cx.    Polysubstance abuse:  -Continue Suboxone  -Continue MV, thiamine, folic acid  -Ativan taper  -CATCH team eval    DM2 without insulin use with neuropathy:  Sliding scale  CHO diet  Continue gabapentin    HTN:  Continue lisinopril  DASH diet    On Lovenox for DVT prophylaxis

## 2021-08-15 NOTE — CONSULT NOTE ADULT - SUBJECTIVE AND OBJECTIVE BOX
Patient is a 51y old  Male whose PMH includes DM, neuropathy,  HTN and substance abuse (on Suboxone uses alcohol, uses tobacco) presents to ER for evaluation of developing redness to both legs. Associated with a burning pain sensation.  Denies fevers. These findings began about 1 week ago after a fall which resulted in abrasions to both legs. Also asking for Covid vaccine. He has started on Cefepime and IV Vancomycin , and the ID consult requested to assist with further evaluation and antibiotic management.       REVIEW OF SYSTEMS: Total of twelve systems have been reviewed with patient and found to be negative unless mentioned in HPI      PAST MEDICAL & SURGICAL HISTORY:  HTN (hypertension)  DM (diabetes mellitus)  Alcohol dependency  History of incision and drainage      SOCIAL HISTORY  Alcohol: Does not drink  Tobacco: Does not smoke  Illicit substance use: None      FAMILY HISTORY: Non contributory to the present illness        ALLERGIES: No Known Allergies      \Vital Signs Last 24 Hrs  T(C): 36.7 (15 Aug 2021 14:47), Max: 37.5 (15 Aug 2021 05:00)  T(F): 98 (15 Aug 2021 14:47), Max: 99.5 (15 Aug 2021 05:00)  HR: 85 (15 Aug 2021 14:47) (77 - 85)  BP: 149/104 (15 Aug 2021 14:47) (148/101 - 169/95)  BP(mean): --  RR: 18 (15 Aug 2021 14:47) (16 - 18)  SpO2: --      PHYSICAL EXAM:  GENERAL: Not in distress   CHEST/LUNG:  Not using accessory muscles   HEART: s1 and s2 present  ABDOMEN:  Nontender and  Nondistended  EXTREMITIES: No pedal  edema  CNS: Awake and Alert      LABS:                        11.8   2.26  )-----------( 39       ( 15 Aug 2021 07:07 )             36.2         08-15    134<L>  |  99  |  12  ----------------------------<  294<H>  4.7   |  25  |  0.5<L>    Ca    7.9<L>      15 Aug 2021 07:07    TPro  5.6<L>  /  Alb  3.2<L>  /  TBili  0.9  /  DBili  x   /  AST  70<H>  /  ALT  22  /  AlkPhos  78  08-15        CAPILLARY BLOOD GLUCOSE  POCT Blood Glucose.: 229 mg/dL (15 Aug 2021 16:35)  POCT Blood Glucose.: 316 mg/dL (15 Aug 2021 11:40)  POCT Blood Glucose.: 257 mg/dL (15 Aug 2021 07:59)        MEDICATIONS  (STANDING):  buprenorphine 8 mG/naloxone 2 mG SL  Tablet 1 Tablet(s) SubLingual <User Schedule>  cefepime   IVPB 1000 milliGRAM(s) IV Intermittent every 12 hours  folic acid 1 milliGRAM(s) Oral daily  gabapentin 300 milliGRAM(s) Oral three times a day  glucagon  Injectable 1 milliGRAM(s) IntraMuscular once  insulin lispro (ADMELOG) corrective regimen sliding scale   SubCutaneous three times a day before meals  insulin lispro (ADMELOG) corrective regimen sliding scale   SubCutaneous at bedtime  lisinopril 10 milliGRAM(s) Oral daily  LORazepam     Tablet 1.5 milliGRAM(s) Oral every 4 hours  LORazepam     Tablet   Oral   metroNIDAZOLE  IVPB 500 milliGRAM(s) IV Intermittent every 8 hours  nicotine - 21 mG/24Hr(s) Patch 1 patch Transdermal daily  pantoprazole    Tablet 40 milliGRAM(s) Oral before breakfast  thiamine 100 milliGRAM(s) Oral daily  vancomycin  IVPB 1000 milliGRAM(s) IV Intermittent two times a day  zinc sulfate 220 milliGRAM(s) Oral daily    MEDICATIONS  (PRN):  aluminum hydroxide/magnesium hydroxide/simethicone Suspension 30 milliLiter(s) Oral every 6 hours PRN Dyspepsia  simethicone 80 milliGRAM(s) Chew four times a day PRN Gas        RADIOLOGY & ADDITIONAL TESTS:             Patient is a 51y old  Male whose PMH includes DM, neuropathy,  HTN and substance abuse (on Suboxone uses alcohol, uses tobacco) presents to ER for evaluation of developing redness to both legs. Associated with a burning pain sensation.  Denies fevers. These findings began about 1 week ago after a fall which resulted in abrasions to both legs. Also asking for Covid vaccine. He has started on Cefepime and IV Vancomycin , and the ID consult requested to assist with further evaluation and antibiotic management.       REVIEW OF SYSTEMS: Total of twelve systems have been reviewed with patient and found to be negative unless mentioned in HPI      PAST MEDICAL & SURGICAL HISTORY:  HTN (hypertension)  DM (diabetes mellitus)  Alcohol dependency  History of incision and drainage      SOCIAL HISTORY  Alcohol: Does not drink  Tobacco: Does not smoke  Illicit substance use: None      FAMILY HISTORY: Non contributory to the present illness        ALLERGIES: No Known Allergies      \Vital Signs Last 24 Hrs  T(C): 36.7 (15 Aug 2021 14:47), Max: 37.5 (15 Aug 2021 05:00)  T(F): 98 (15 Aug 2021 14:47), Max: 99.5 (15 Aug 2021 05:00)  HR: 85 (15 Aug 2021 14:47) (77 - 85)  BP: 149/104 (15 Aug 2021 14:47) (148/101 - 169/95)  BP(mean): --  RR: 18 (15 Aug 2021 14:47) (16 - 18)  SpO2: --      PHYSICAL EXAM:  GENERAL: Not in distress   CHEST/LUNG:  Not using accessory muscles   HEART: s1 and s2 present  ABDOMEN:  Nontender and  Nondistended  EXTREMITIES: Both LE erythematous and tender, R>L  CNS: Awake and Alert      LABS:                        11.8   2.26  )-----------( 39       ( 15 Aug 2021 07:07 )             36.2         08-15    134<L>  |  99  |  12  ----------------------------<  294<H>  4.7   |  25  |  0.5<L>    Ca    7.9<L>      15 Aug 2021 07:07    TPro  5.6<L>  /  Alb  3.2<L>  /  TBili  0.9  /  DBili  x   /  AST  70<H>  /  ALT  22  /  AlkPhos  78  08-15        CAPILLARY BLOOD GLUCOSE  POCT Blood Glucose.: 229 mg/dL (15 Aug 2021 16:35)  POCT Blood Glucose.: 316 mg/dL (15 Aug 2021 11:40)  POCT Blood Glucose.: 257 mg/dL (15 Aug 2021 07:59)        MEDICATIONS  (STANDING):  buprenorphine 8 mG/naloxone 2 mG SL  Tablet 1 Tablet(s) SubLingual <User Schedule>  cefepime   IVPB 1000 milliGRAM(s) IV Intermittent every 12 hours  folic acid 1 milliGRAM(s) Oral daily  gabapentin 300 milliGRAM(s) Oral three times a day  glucagon  Injectable 1 milliGRAM(s) IntraMuscular once  insulin lispro (ADMELOG) corrective regimen sliding scale   SubCutaneous three times a day before meals  insulin lispro (ADMELOG) corrective regimen sliding scale   SubCutaneous at bedtime  lisinopril 10 milliGRAM(s) Oral daily  LORazepam     Tablet 1.5 milliGRAM(s) Oral every 4 hours  LORazepam     Tablet   Oral   metroNIDAZOLE  IVPB 500 milliGRAM(s) IV Intermittent every 8 hours  nicotine - 21 mG/24Hr(s) Patch 1 patch Transdermal daily  pantoprazole    Tablet 40 milliGRAM(s) Oral before breakfast  thiamine 100 milliGRAM(s) Oral daily  vancomycin  IVPB 1000 milliGRAM(s) IV Intermittent two times a day  zinc sulfate 220 milliGRAM(s) Oral daily    MEDICATIONS  (PRN):  aluminum hydroxide/magnesium hydroxide/simethicone Suspension 30 milliLiter(s) Oral every 6 hours PRN Dyspepsia  simethicone 80 milliGRAM(s) Chew four times a day PRN Gas        RADIOLOGY & ADDITIONAL TESTS:

## 2021-08-15 NOTE — CONSULT NOTE ADULT - ASSESSMENT
Patient is a 51y old  Male whose PMH includes DM, neuropathy,  HTN and substance abuse (on Suboxone uses alcohol, uses tobacco) presents to ER for evaluation of developing redness to both legs. Associated with a burning pain sensation.  Denies fevers. These findings began about 1 week ago after a fall which resulted in abrasions to both legs. Also asking for Covid vaccine. He has started on Cefepime and IV Vancomycin , and the ID consult requested to assist with further evaluation and antibiotic management.     # LE purulent cellulitis  # Substance abuse    would recommend:    1. Continue Cefepime and IV Vancomycin until work up is done  2. Less likely need Flagyl  3. Blood cultures X 2 for completeness     will follow the patient with you and make further recommendation based on the clinical course and Lab results  Thank you for the opportunity to participate in Mr. STEWART's care      Attending Attestation:    Spent more than 65 minutes on total encounter, more than 50 % of the visit was spent counseling and/or coordinating care by the Attending physician.       Patient is a 51y old  Male whose PMH includes DM, neuropathy,  HTN and substance abuse (on Suboxone uses alcohol, uses tobacco) presents to ER for evaluation of developing redness to both legs. Associated with a burning pain sensation.  Denies fevers. These findings began about 1 week ago after a fall which resulted in abrasions to both legs. Also asking for Covid vaccine. He has started on Cefepime and IV Vancomycin , and the ID consult requested to assist with further evaluation and antibiotic management.     # RLE purulent cellulitis  # Substance abuse    would recommend:    1. Continue Cefepime and IV Vancomycin until work up is done  2. Less likely need Flagyl  3. Blood cultures X 2 for completeness   4. Keep LE elevated    will follow the patient with you and make further recommendation based on the clinical course and Lab results  Thank you for the opportunity to participate in Mr. STEWART's care      Attending Attestation:    Spent more than 65 minutes on total encounter, more than 50 % of the visit was spent counseling and/or coordinating care by the Attending physician.

## 2021-08-15 NOTE — PROGRESS NOTE ADULT - SUBJECTIVE AND OBJECTIVE BOX
50yo male whose PMH includes DM, neuropathy,  HTN and substance abuse (on Suboxone uses alcohol, uses tobacco) presents to ER due to developing redness to both legs.    Today:  Seen at bedside, no new complaints.        REVIEW OF SYSTEMS:  B/L LE pain      MEDICATIONS  (STANDING):  buprenorphine 8 mG/naloxone 2 mG SL  Tablet 1 Tablet(s) SubLingual <User Schedule>  cefepime   IVPB 1000 milliGRAM(s) IV Intermittent every 12 hours  folic acid 1 milliGRAM(s) Oral daily  gabapentin 300 milliGRAM(s) Oral three times a day  lisinopril 10 milliGRAM(s) Oral daily  LORazepam     Tablet 1.5 milliGRAM(s) Oral every 4 hours  LORazepam     Tablet   Oral   LORazepam     Tablet 2 milliGRAM(s) Oral every 4 hours  metroNIDAZOLE  IVPB 500 milliGRAM(s) IV Intermittent every 8 hours  nicotine - 21 mG/24Hr(s) Patch 1 patch Transdermal daily  thiamine 100 milliGRAM(s) Oral daily  vancomycin  IVPB 1000 milliGRAM(s) IV Intermittent two times a day  zinc sulfate 220 milliGRAM(s) Oral daily          Allergies  No Known Allergies        FAMILY HISTORY:  FH: alcohol abuse  dad        Vital Signs Last 24 Hrs  T(C): 36.7 (15 Aug 2021 14:47), Max: 37.5 (15 Aug 2021 05:00)  T(F): 98 (15 Aug 2021 14:47), Max: 99.5 (15 Aug 2021 05:00)  HR: 85 (15 Aug 2021 14:47) (77 - 89)  BP: 149/104 (15 Aug 2021 14:47) (148/101 - 171/95)  RR: 18 (15 Aug 2021 14:47) (16 - 18)      PHYSICAL EXAM:  GENERAL: NAD, well-groomed, well-developed  HEAD:  Atraumatic, Normocephalic  EYES: EOMI, PERRLA, conjunctiva and sclera clear  ENMT: No tonsillar erythema, exudates, or enlargement; Moist mucous membranes, Good dentition, No lesions  NECK: Supple, No JVD, Normal thyroid  NERVOUS SYSTEM:  Alert & Oriented X3, Good concentration  CHEST/LUNG: Clear to percussion bilaterally; No rales, rhonchi, wheezing, or rubs  HEART: Regular rate and rhythm; No murmurs, rubs, or gallops  ABDOMEN: Soft, Nontender, Nondistended; Bowel sounds present  EXTREMITIES:  erythema and abrasions b/l LE below knees      LABS:                        11.8   2.26  )-----------( 39       ( 15 Aug 2021 07:07 )             36.2     08-15    134<L>  |  99  |  12  ----------------------------<  294<H>  4.7   |  25  |  0.5<L>    Ca    7.9<L>      15 Aug 2021 07:07    TPro  5.6<L>  /  Alb  3.2<L>  /  TBili  0.9  /  DBili  x   /  AST  70<H>  /  ALT  22  /  AlkPhos  78  08-15          RADIOLOGY & ADDITIONAL STUDIES: 50yo male whose PMH includes DM, neuropathy,  HTN and substance abuse (on Suboxone uses alcohol, uses tobacco) presents to ER due to developing redness to both legs.    Today:  Seen at bedside, complains of LE pain.        REVIEW OF SYSTEMS:  B/L LE pain      MEDICATIONS  (STANDING):  buprenorphine 8 mG/naloxone 2 mG SL  Tablet 1 Tablet(s) SubLingual <User Schedule>  cefepime   IVPB 1000 milliGRAM(s) IV Intermittent every 12 hours  folic acid 1 milliGRAM(s) Oral daily  gabapentin 300 milliGRAM(s) Oral three times a day  lisinopril 10 milliGRAM(s) Oral daily  LORazepam     Tablet 1.5 milliGRAM(s) Oral every 4 hours  LORazepam     Tablet   Oral   LORazepam     Tablet 2 milliGRAM(s) Oral every 4 hours  metroNIDAZOLE  IVPB 500 milliGRAM(s) IV Intermittent every 8 hours  nicotine - 21 mG/24Hr(s) Patch 1 patch Transdermal daily  thiamine 100 milliGRAM(s) Oral daily  vancomycin  IVPB 1000 milliGRAM(s) IV Intermittent two times a day  zinc sulfate 220 milliGRAM(s) Oral daily          Allergies  No Known Allergies        FAMILY HISTORY:  FH: alcohol abuse  dad        Vital Signs Last 24 Hrs  T(C): 36.7 (15 Aug 2021 14:47), Max: 37.5 (15 Aug 2021 05:00)  T(F): 98 (15 Aug 2021 14:47), Max: 99.5 (15 Aug 2021 05:00)  HR: 85 (15 Aug 2021 14:47) (77 - 89)  BP: 149/104 (15 Aug 2021 14:47) (148/101 - 171/95)  RR: 18 (15 Aug 2021 14:47) (16 - 18)      PHYSICAL EXAM:  GENERAL: NAD, well-groomed, well-developed  HEAD:  Atraumatic, Normocephalic  EYES: EOMI, PERRLA, conjunctiva and sclera clear  ENMT: No tonsillar erythema, exudates, or enlargement; Moist mucous membranes, Good dentition, No lesions  NECK: Supple, No JVD, Normal thyroid  NERVOUS SYSTEM:  Alert & Oriented X3, Good concentration  CHEST/LUNG: Clear to percussion bilaterally; No rales, rhonchi, wheezing, or rubs  HEART: Regular rate and rhythm; No murmurs, rubs, or gallops  ABDOMEN: Soft, Nontender, Nondistended; Bowel sounds present  EXTREMITIES:  erythema and abrasions b/l LE below knees      LABS:                        11.8   2.26  )-----------( 39       ( 15 Aug 2021 07:07 )             36.2     08-15    134<L>  |  99  |  12  ----------------------------<  294<H>  4.7   |  25  |  0.5<L>    Ca    7.9<L>      15 Aug 2021 07:07    TPro  5.6<L>  /  Alb  3.2<L>  /  TBili  0.9  /  DBili  x   /  AST  70<H>  /  ALT  22  /  AlkPhos  78  08-15

## 2021-08-16 LAB
A1C WITH ESTIMATED AVERAGE GLUCOSE RESULT: 8.9 % — HIGH (ref 4–5.6)
ALBUMIN SERPL ELPH-MCNC: 3.4 G/DL — LOW (ref 3.5–5.2)
ALP SERPL-CCNC: 88 U/L — SIGNIFICANT CHANGE UP (ref 30–115)
ALT FLD-CCNC: 22 U/L — SIGNIFICANT CHANGE UP (ref 0–41)
ANION GAP SERPL CALC-SCNC: 7 MMOL/L — SIGNIFICANT CHANGE UP (ref 7–14)
AST SERPL-CCNC: 69 U/L — HIGH (ref 0–41)
BASOPHILS # BLD AUTO: 0.01 K/UL — SIGNIFICANT CHANGE UP (ref 0–0.2)
BASOPHILS NFR BLD AUTO: 0.3 % — SIGNIFICANT CHANGE UP (ref 0–1)
BILIRUB SERPL-MCNC: 2.3 MG/DL — HIGH (ref 0.2–1.2)
BUN SERPL-MCNC: 8 MG/DL — LOW (ref 10–20)
CALCIUM SERPL-MCNC: 8.9 MG/DL — SIGNIFICANT CHANGE UP (ref 8.5–10.1)
CHLORIDE SERPL-SCNC: 95 MMOL/L — LOW (ref 98–110)
CO2 SERPL-SCNC: 32 MMOL/L — SIGNIFICANT CHANGE UP (ref 17–32)
CREAT SERPL-MCNC: 0.7 MG/DL — SIGNIFICANT CHANGE UP (ref 0.7–1.5)
EOSINOPHIL # BLD AUTO: 0.07 K/UL — SIGNIFICANT CHANGE UP (ref 0–0.7)
EOSINOPHIL NFR BLD AUTO: 2.4 % — SIGNIFICANT CHANGE UP (ref 0–8)
ESTIMATED AVERAGE GLUCOSE: 209 MG/DL — HIGH (ref 68–114)
GLUCOSE BLDC GLUCOMTR-MCNC: 278 MG/DL — HIGH (ref 70–99)
GLUCOSE BLDC GLUCOMTR-MCNC: 298 MG/DL — HIGH (ref 70–99)
GLUCOSE SERPL-MCNC: 208 MG/DL — HIGH (ref 70–99)
HCT VFR BLD CALC: 41.4 % — LOW (ref 42–52)
HGB BLD-MCNC: 14 G/DL — SIGNIFICANT CHANGE UP (ref 14–18)
IMM GRANULOCYTES NFR BLD AUTO: 0.3 % — SIGNIFICANT CHANGE UP (ref 0.1–0.3)
LYMPHOCYTES # BLD AUTO: 0.9 K/UL — LOW (ref 1.2–3.4)
LYMPHOCYTES # BLD AUTO: 30.9 % — SIGNIFICANT CHANGE UP (ref 20.5–51.1)
MCHC RBC-ENTMCNC: 32.9 PG — HIGH (ref 27–31)
MCHC RBC-ENTMCNC: 33.8 G/DL — SIGNIFICANT CHANGE UP (ref 32–37)
MCV RBC AUTO: 97.2 FL — HIGH (ref 80–94)
MONOCYTES # BLD AUTO: 0.36 K/UL — SIGNIFICANT CHANGE UP (ref 0.1–0.6)
MONOCYTES NFR BLD AUTO: 12.4 % — HIGH (ref 1.7–9.3)
NEUTROPHILS # BLD AUTO: 1.56 K/UL — SIGNIFICANT CHANGE UP (ref 1.4–6.5)
NEUTROPHILS NFR BLD AUTO: 53.7 % — SIGNIFICANT CHANGE UP (ref 42.2–75.2)
NRBC # BLD: 0 /100 WBCS — SIGNIFICANT CHANGE UP (ref 0–0)
PLAT MORPH BLD: ABNORMAL
PLATELET # BLD AUTO: 42 K/UL — LOW (ref 130–400)
PLATELET CLUMP BLD QL SMEAR: SLIGHT
PLATELET COUNT - ESTIMATE: ABNORMAL
POTASSIUM SERPL-MCNC: 5.3 MMOL/L — HIGH (ref 3.5–5)
POTASSIUM SERPL-SCNC: 5.3 MMOL/L — HIGH (ref 3.5–5)
PROT SERPL-MCNC: 6.1 G/DL — SIGNIFICANT CHANGE UP (ref 6–8)
RBC # BLD: 4.26 M/UL — LOW (ref 4.7–6.1)
RBC # FLD: 12.1 % — SIGNIFICANT CHANGE UP (ref 11.5–14.5)
RBC BLD AUTO: NORMAL — SIGNIFICANT CHANGE UP
SODIUM SERPL-SCNC: 134 MMOL/L — LOW (ref 135–146)
STOMATOCYTES BLD QL SMEAR: SLIGHT — SIGNIFICANT CHANGE UP
WBC # BLD: 2.91 K/UL — LOW (ref 4.8–10.8)
WBC # FLD AUTO: 2.91 K/UL — LOW (ref 4.8–10.8)

## 2021-08-16 PROCEDURE — 99232 SBSQ HOSP IP/OBS MODERATE 35: CPT

## 2021-08-16 RX ORDER — SODIUM ZIRCONIUM CYCLOSILICATE 10 G/10G
10 POWDER, FOR SUSPENSION ORAL ONCE
Refills: 0 | Status: COMPLETED | OUTPATIENT
Start: 2021-08-16 | End: 2021-08-16

## 2021-08-16 RX ORDER — DIPHENHYDRAMINE HCL 50 MG
25 CAPSULE ORAL ONCE
Refills: 0 | Status: COMPLETED | OUTPATIENT
Start: 2021-08-16 | End: 2021-08-16

## 2021-08-16 RX ORDER — CEFTRIAXONE 500 MG/1
2000 INJECTION, POWDER, FOR SOLUTION INTRAMUSCULAR; INTRAVENOUS EVERY 24 HOURS
Refills: 0 | Status: DISCONTINUED | OUTPATIENT
Start: 2021-08-16 | End: 2021-08-19

## 2021-08-16 RX ADMIN — ZINC SULFATE TAB 220 MG (50 MG ZINC EQUIVALENT) 220 MILLIGRAM(S): 220 (50 ZN) TAB at 11:06

## 2021-08-16 RX ADMIN — CEFTRIAXONE 100 MILLIGRAM(S): 500 INJECTION, POWDER, FOR SOLUTION INTRAMUSCULAR; INTRAVENOUS at 16:23

## 2021-08-16 RX ADMIN — BUPRENORPHINE AND NALOXONE 1 TABLET(S): 2; .5 TABLET SUBLINGUAL at 08:44

## 2021-08-16 RX ADMIN — Medication 1 PATCH: at 11:07

## 2021-08-16 RX ADMIN — SODIUM ZIRCONIUM CYCLOSILICATE 10 GRAM(S): 10 POWDER, FOR SUSPENSION ORAL at 16:22

## 2021-08-16 RX ADMIN — Medication 100 MILLIGRAM(S): at 05:04

## 2021-08-16 RX ADMIN — Medication 1.5 MILLIGRAM(S): at 15:07

## 2021-08-16 RX ADMIN — Medication 1.5 MILLIGRAM(S): at 02:21

## 2021-08-16 RX ADMIN — Medication 25 MILLIGRAM(S): at 23:09

## 2021-08-16 RX ADMIN — GABAPENTIN 300 MILLIGRAM(S): 400 CAPSULE ORAL at 05:04

## 2021-08-16 RX ADMIN — Medication 250 MILLIGRAM(S): at 17:28

## 2021-08-16 RX ADMIN — Medication 250 MILLIGRAM(S): at 05:04

## 2021-08-16 RX ADMIN — Medication 1 MILLIGRAM(S): at 11:06

## 2021-08-16 RX ADMIN — GABAPENTIN 300 MILLIGRAM(S): 400 CAPSULE ORAL at 15:07

## 2021-08-16 RX ADMIN — Medication 1.5 MILLIGRAM(S): at 11:01

## 2021-08-16 RX ADMIN — Medication 1 PATCH: at 05:15

## 2021-08-16 RX ADMIN — Medication 1 PATCH: at 19:15

## 2021-08-16 RX ADMIN — Medication 1 MILLIGRAM(S): at 21:00

## 2021-08-16 RX ADMIN — Medication 6: at 11:59

## 2021-08-16 RX ADMIN — Medication 4: at 17:18

## 2021-08-16 RX ADMIN — BUPRENORPHINE AND NALOXONE 1 TABLET(S): 2; .5 TABLET SUBLINGUAL at 20:59

## 2021-08-16 RX ADMIN — Medication 1.5 MILLIGRAM(S): at 17:28

## 2021-08-16 RX ADMIN — CEFEPIME 100 MILLIGRAM(S): 1 INJECTION, POWDER, FOR SOLUTION INTRAMUSCULAR; INTRAVENOUS at 05:04

## 2021-08-16 RX ADMIN — LISINOPRIL 10 MILLIGRAM(S): 2.5 TABLET ORAL at 05:04

## 2021-08-16 RX ADMIN — PANTOPRAZOLE SODIUM 40 MILLIGRAM(S): 20 TABLET, DELAYED RELEASE ORAL at 05:05

## 2021-08-16 RX ADMIN — Medication 100 MILLIGRAM(S): at 11:06

## 2021-08-16 RX ADMIN — GABAPENTIN 300 MILLIGRAM(S): 400 CAPSULE ORAL at 21:01

## 2021-08-16 RX ADMIN — Medication 1.5 MILLIGRAM(S): at 05:05

## 2021-08-16 RX ADMIN — Medication 6: at 07:59

## 2021-08-16 NOTE — PROGRESS NOTE ADULT - SUBJECTIVE AND OBJECTIVE BOX
GINO STEWART  51y, Male  Allergy: No Known Allergies      LOS  2d    CHIEF COMPLAINT: leg cellulitis (15 Aug 2021 19:58)      INTERVAL EVENTS/HPI  - No acute events overnight  - T(F): , Max: 99.7 (08-15-21 @ 21:20)  - reports leg feels less swollen,    - WBC Count: 2.91 (08-16-21 @ 08:27)  WBC Count: 2.26 (08-15-21 @ 07:07)     - Creatinine, Serum: 0.7 (08-16-21 @ 08:27)  Creatinine, Serum: 0.5 (08-15-21 @ 07:07)       ROS  General: Denies rigors, nightsweats  HEENT: Denies headache, rhinorrhea, sore throat, eye pain  CV: Denies CP, palpitations  PULM: Denies wheezing, hemoptysis  GI: Denies hematemesis, hematochezia, melena  : Denies discharge, hematuria  MSK: Denies arthralgias, myalgias  SKIN: Denies rash, lesions  NEURO: Denies paresthesias, weakness  PSYCH: Denies depression, anxiety    VITALS:  T(F): 98.2, Max: 99.7 (08-15-21 @ 21:20)  HR: 99  BP: 169/100  RR: 18Vital Signs Last 24 Hrs  T(C): 36.8 (16 Aug 2021 05:04), Max: 37.6 (15 Aug 2021 21:20)  T(F): 98.2 (16 Aug 2021 05:04), Max: 99.7 (15 Aug 2021 21:20)  HR: 99 (16 Aug 2021 05:04) (77 - 99)  BP: 169/100 (16 Aug 2021 05:04) (149/104 - 193/89)  BP(mean): --  RR: 18 (16 Aug 2021 05:04) (18 - 20)  SpO2: --    PHYSICAL EXAM:  Gen: NAD, resting in bed  HEENT: Normocephalic, atraumatic  Neck: supple, no lymphadenopathy  CV: Regular rate & regular rhythm  Lungs: decreased BS at bases, no fremitus  Abdomen: Soft, BS present  Ext: Warm, well perfused  Neuro: non focal, awake  Skin: venous stasis changes, R leg warm, erythematous   Lines: no phlebitis    FH: Non-contributory  Social Hx: Non-contributory    TESTS & MEASUREMENTS:                        14.0   2.91  )-----------( 42       ( 16 Aug 2021 08:27 )             41.4     08-16    134<L>  |  95<L>  |  8<L>  ----------------------------<  208<H>  5.3<H>   |  32  |  0.7    Ca    8.9      16 Aug 2021 08:27    TPro  6.1  /  Alb  3.4<L>  /  TBili  2.3<H>  /  DBili  x   /  AST  69<H>  /  ALT  22  /  AlkPhos  88  08-16    eGFR if Non African American: 108 mL/min/1.73M2 (08-16-21 @ 08:27)  eGFR if : 126 mL/min/1.73M2 (08-16-21 @ 08:27)    LIVER FUNCTIONS - ( 16 Aug 2021 08:27 )  Alb: 3.4 g/dL / Pro: 6.1 g/dL / ALK PHOS: 88 U/L / ALT: 22 U/L / AST: 69 U/L / GGT: x                 Lactate, Blood: 1.8 mmol/L (08-15-21 @ 07:07)      INFECTIOUS DISEASES TESTING  Rapid RVP Result: NotDetec (08-14-21 @ 18:40)  HIV-1/2 Combo Result: Nonreact (07-29-21 @ 06:47)  Rapid RVP Result: NotDetec (07-27-21 @ 12:48)  COVID-19 PCR: NotDetec (07-24-21 @ 12:30)      INFLAMMATORY MARKERS      RADIOLOGY & ADDITIONAL TESTS:  I have personally reviewed the last available Chest xray  CXR  Xray Chest 1 View- PORTABLE-Urgent:   EXAM:  XR CHEST PORTABLE URGENT 1V            PROCEDURE DATE:  08/14/2021            INTERPRETATION:  Clinical History / Reason for exam: 51-year-old male with leg swelling.    Comparison : Chest radiograph performed 7/24/2021.    Technique/Positioning: Portable, AP view.  Lung volumes are decreased bilaterally.    Findings:    Support devices: None.    Cardiac/mediastinum/hilum: The cardiac silhouette is magnified.    Lung parenchyma/Pleura: No focal pulmonary opacity, pleural effusion or pneumothorax is seen.    Skeleton/soft tissues: There are thoracic spine degenerative changes.    Impression:    No radiographic evidence of acute cardiopulmonary disease.        --- End of Report ---              BETTY MASON MD; Attending Radiologist  This document has been electronically signed. Aug 15 2021  8:07AM (08-14-21 @ 21:21)      CT      CARDIOLOGY TESTING      MEDICATIONS  buprenorphine 8 mG/naloxone 2 mG SL  Tablet 1 SubLingual <User Schedule>  cefepime   IVPB 1000 IV Intermittent every 12 hours  dextrose 40% Gel 15 Oral once  dextrose 5%. 1000 IV Continuous <Continuous>  dextrose 5%. 1000 IV Continuous <Continuous>  dextrose 50% Injectable 25 IV Push once  dextrose 50% Injectable 12.5 IV Push once  dextrose 50% Injectable 25 IV Push once  enoxaparin Injectable 40 SubCutaneous daily  folic acid 1 Oral daily  gabapentin 300 Oral three times a day  glucagon  Injectable 1 IntraMuscular once  insulin lispro (ADMELOG) corrective regimen sliding scale  SubCutaneous three times a day before meals  insulin lispro (ADMELOG) corrective regimen sliding scale  SubCutaneous at bedtime  lisinopril 10 Oral daily  LORazepam     Tablet 1.5 Oral every 4 hours  LORazepam     Tablet 1 Oral every 4 hours  LORazepam     Tablet  Oral   metroNIDAZOLE  IVPB 500 IV Intermittent every 8 hours  nicotine - 21 mG/24Hr(s) Patch 1 Transdermal daily  pantoprazole    Tablet 40 Oral before breakfast  thiamine 100 Oral daily  vancomycin  IVPB 1000 IV Intermittent two times a day  zinc sulfate 220 Oral daily      WEIGHT  Weight (kg): 122.3 (08-14-21 @ 22:39)  Creatinine, Serum: 0.7 mg/dL (08-16-21 @ 08:27)      ANTIBIOTICS:  cefepime   IVPB 1000 milliGRAM(s) IV Intermittent every 12 hours  metroNIDAZOLE  IVPB 500 milliGRAM(s) IV Intermittent every 8 hours  vancomycin  IVPB 1000 milliGRAM(s) IV Intermittent two times a day      All available historical records have been reviewed

## 2021-08-16 NOTE — PROGRESS NOTE ADULT - ASSESSMENT
Patient is a 51y old  Male whose PMH includes DM, neuropathy,  HTN and substance abuse (on Suboxone uses alcohol, uses tobacco) presents to ER for evaluation of developing redness to both legs. Associated with a burning pain sensation.  Denies fevers. These findings began about 1 week ago after a fall which resulted in abrasions to both legs. Also asking for Covid vaccine. He has started on Cefepime and IV Vancomycin , and the ID consult requested to assist with further evaluation and antibiotic management.     # RLE purulent cellulitis  # Substance abuse    Recommendations  - follow-up blood cultures  - continue vancomycin 1000 mg q 12 hours -- please obtain trough prior to 4th dose  - narrow cefepime to ceftriaxone 2g daily  - can stop flagyl   - keep LE elevated  -     Please call or message on [employer] Teams if with any questions.  Spectra [phone]

## 2021-08-16 NOTE — PROGRESS NOTE ADULT - SUBJECTIVE AND OBJECTIVE BOX
52yo male whose PMH includes DM, neuropathy,  HTN and substance abuse (on Suboxone uses alcohol, uses tobacco) presents to ER due to developing redness to both legs.    Today:  Seen at bedside, states he feels bloated and thinks he has gas.          REVIEW OF SYSTEMS:  abdominal discomfort      MEDICATIONS  (STANDING):  buprenorphine 8 mG/naloxone 2 mG SL  Tablet 1 Tablet(s) SubLingual <User Schedule>  cefTRIAXone   IVPB 2000 milliGRAM(s) IV Intermittent every 24 hours  dextrose 40% Gel 15 Gram(s) Oral once  dextrose 5%. 1000 milliLiter(s) (50 mL/Hr) IV Continuous <Continuous>  dextrose 5%. 1000 milliLiter(s) (100 mL/Hr) IV Continuous <Continuous>  dextrose 50% Injectable 25 Gram(s) IV Push once  dextrose 50% Injectable 12.5 Gram(s) IV Push once  dextrose 50% Injectable 25 Gram(s) IV Push once  enoxaparin Injectable 40 milliGRAM(s) SubCutaneous daily  folic acid 1 milliGRAM(s) Oral daily  gabapentin 300 milliGRAM(s) Oral three times a day  glucagon  Injectable 1 milliGRAM(s) IntraMuscular once  insulin lispro (ADMELOG) corrective regimen sliding scale   SubCutaneous three times a day before meals  insulin lispro (ADMELOG) corrective regimen sliding scale   SubCutaneous at bedtime  lisinopril 10 milliGRAM(s) Oral daily  LORazepam     Tablet 1.5 milliGRAM(s) Oral every 4 hours  LORazepam     Tablet 1 milliGRAM(s) Oral every 4 hours  LORazepam     Tablet   Oral   nicotine - 21 mG/24Hr(s) Patch 1 patch Transdermal daily  pantoprazole    Tablet 40 milliGRAM(s) Oral before breakfast  sodium zirconium cyclosilicate 10 Gram(s) Oral once  thiamine 100 milliGRAM(s) Oral daily  vancomycin  IVPB 1000 milliGRAM(s) IV Intermittent two times a day  zinc sulfate 220 milliGRAM(s) Oral daily    MEDICATIONS  (PRN):  aluminum hydroxide/magnesium hydroxide/simethicone Suspension 30 milliLiter(s) Oral every 6 hours PRN Dyspepsia  simethicone 80 milliGRAM(s) Chew four times a day PRN Gas      Allergies  No Known Allergies      FAMILY HISTORY:  FH: alcohol abuse  dad        Vital Signs Last 24 Hrs  T(C): 36.8 (16 Aug 2021 05:04), Max: 37.6 (15 Aug 2021 21:20)  T(F): 98.2 (16 Aug 2021 05:04), Max: 99.7 (15 Aug 2021 21:20)  HR: 99 (16 Aug 2021 05:04) (77 - 99)  BP: 169/100 (16 Aug 2021 05:04) (149/104 - 193/89)  RR: 18 (16 Aug 2021 05:04) (18 - 20)      PHYSICAL EXAM:  GENERAL: NAD, well-groomed, well-developed  HEAD:  Atraumatic, Normocephalic  NECK: Supple, No JVD, Normal thyroid  NERVOUS SYSTEM:  Alert & Oriented X3, Good concentration  CHEST/LUNG: Clear to percussion bilaterally; No rales, rhonchi, wheezing, or rubs  HEART: Regular rate and rhythm; No murmurs, rubs, or gallops  ABDOMEN: Soft, Nontender, Nondistended; Bowel sounds present  EXTREMITIES:  erythema and abrasions b/l LE below knees      LABS:                        14.0   2.91  )-----------( 42       ( 16 Aug 2021 08:27 )             41.4     08-16    134<L>  |  95<L>  |  8<L>  ----------------------------<  208<H>  5.3<H>   |  32  |  0.7    Ca    8.9      16 Aug 2021 08:27    TPro  6.1  /  Alb  3.4<L>  /  TBili  2.3<H>  /  DBili  x   /  AST  69<H>  /  ALT  22  /  AlkPhos  88  08-16

## 2021-08-16 NOTE — PROGRESS NOTE ADULT - ASSESSMENT
50yo male whose PMH includes DM, neuropathy,  HTN and substance abuse (on Suboxone uses alcohol, uses tobacco) presents to ER due to developing redness to both legs.    B/L LE Cellulitis:  -Continue Vanco, stop cefepime start Rocephin 8/16, DC Flagyl.  -Follow Blood Cx.    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

## 2021-08-17 LAB
ALBUMIN SERPL ELPH-MCNC: 3.4 G/DL — LOW (ref 3.5–5.2)
ALP SERPL-CCNC: 80 U/L — SIGNIFICANT CHANGE UP (ref 30–115)
ALT FLD-CCNC: 21 U/L — SIGNIFICANT CHANGE UP (ref 0–41)
ANION GAP SERPL CALC-SCNC: 9 MMOL/L — SIGNIFICANT CHANGE UP (ref 7–14)
AST SERPL-CCNC: 74 U/L — HIGH (ref 0–41)
BASOPHILS # BLD AUTO: 0.01 K/UL — SIGNIFICANT CHANGE UP (ref 0–0.2)
BASOPHILS NFR BLD AUTO: 0.4 % — SIGNIFICANT CHANGE UP (ref 0–1)
BILIRUB SERPL-MCNC: 2 MG/DL — HIGH (ref 0.2–1.2)
BUN SERPL-MCNC: 9 MG/DL — LOW (ref 10–20)
CALCIUM SERPL-MCNC: 8.6 MG/DL — SIGNIFICANT CHANGE UP (ref 8.5–10.1)
CHLORIDE SERPL-SCNC: 96 MMOL/L — LOW (ref 98–110)
CO2 SERPL-SCNC: 28 MMOL/L — SIGNIFICANT CHANGE UP (ref 17–32)
CREAT SERPL-MCNC: 0.6 MG/DL — LOW (ref 0.7–1.5)
EOSINOPHIL # BLD AUTO: 0.09 K/UL — SIGNIFICANT CHANGE UP (ref 0–0.7)
EOSINOPHIL NFR BLD AUTO: 3.3 % — SIGNIFICANT CHANGE UP (ref 0–8)
GLUCOSE BLDC GLUCOMTR-MCNC: 176 MG/DL — HIGH (ref 70–99)
GLUCOSE BLDC GLUCOMTR-MCNC: 198 MG/DL — HIGH (ref 70–99)
GLUCOSE BLDC GLUCOMTR-MCNC: 214 MG/DL — HIGH (ref 70–99)
GLUCOSE BLDC GLUCOMTR-MCNC: 256 MG/DL — HIGH (ref 70–99)
GLUCOSE SERPL-MCNC: 190 MG/DL — HIGH (ref 70–99)
HCT VFR BLD CALC: 38.2 % — LOW (ref 42–52)
HGB BLD-MCNC: 13 G/DL — LOW (ref 14–18)
IMM GRANULOCYTES NFR BLD AUTO: 0 % — LOW (ref 0.1–0.3)
LYMPHOCYTES # BLD AUTO: 0.9 K/UL — LOW (ref 1.2–3.4)
LYMPHOCYTES # BLD AUTO: 33 % — SIGNIFICANT CHANGE UP (ref 20.5–51.1)
MCHC RBC-ENTMCNC: 33.2 PG — HIGH (ref 27–31)
MCHC RBC-ENTMCNC: 34 G/DL — SIGNIFICANT CHANGE UP (ref 32–37)
MCV RBC AUTO: 97.7 FL — HIGH (ref 80–94)
MONOCYTES # BLD AUTO: 0.42 K/UL — SIGNIFICANT CHANGE UP (ref 0.1–0.6)
MONOCYTES NFR BLD AUTO: 15.4 % — HIGH (ref 1.7–9.3)
NEUTROPHILS # BLD AUTO: 1.31 K/UL — LOW (ref 1.4–6.5)
NEUTROPHILS NFR BLD AUTO: 47.9 % — SIGNIFICANT CHANGE UP (ref 42.2–75.2)
NRBC # BLD: 0 /100 WBCS — SIGNIFICANT CHANGE UP (ref 0–0)
PLATELET # BLD AUTO: 46 K/UL — LOW (ref 130–400)
POTASSIUM SERPL-MCNC: 4.2 MMOL/L — SIGNIFICANT CHANGE UP (ref 3.5–5)
POTASSIUM SERPL-SCNC: 4.2 MMOL/L — SIGNIFICANT CHANGE UP (ref 3.5–5)
PROT SERPL-MCNC: 5.9 G/DL — LOW (ref 6–8)
RBC # BLD: 3.91 M/UL — LOW (ref 4.7–6.1)
RBC # FLD: 12.2 % — SIGNIFICANT CHANGE UP (ref 11.5–14.5)
SODIUM SERPL-SCNC: 133 MMOL/L — LOW (ref 135–146)
VANCOMYCIN TROUGH SERPL-MCNC: 4.6 UG/ML — LOW (ref 5–10)
WBC # BLD: 2.73 K/UL — LOW (ref 4.8–10.8)
WBC # FLD AUTO: 2.73 K/UL — LOW (ref 4.8–10.8)

## 2021-08-17 PROCEDURE — 99232 SBSQ HOSP IP/OBS MODERATE 35: CPT

## 2021-08-17 RX ORDER — VANCOMYCIN HCL 1 G
1750 VIAL (EA) INTRAVENOUS EVERY 12 HOURS
Refills: 0 | Status: DISCONTINUED | OUTPATIENT
Start: 2021-08-17 | End: 2021-08-19

## 2021-08-17 RX ADMIN — Medication 1 MILLIGRAM(S): at 06:53

## 2021-08-17 RX ADMIN — BUPRENORPHINE AND NALOXONE 1 TABLET(S): 2; .5 TABLET SUBLINGUAL at 08:47

## 2021-08-17 RX ADMIN — GABAPENTIN 300 MILLIGRAM(S): 400 CAPSULE ORAL at 21:20

## 2021-08-17 RX ADMIN — Medication 1 PATCH: at 11:10

## 2021-08-17 RX ADMIN — ZINC SULFATE TAB 220 MG (50 MG ZINC EQUIVALENT) 220 MILLIGRAM(S): 220 (50 ZN) TAB at 11:11

## 2021-08-17 RX ADMIN — Medication 250 MILLIGRAM(S): at 17:10

## 2021-08-17 RX ADMIN — Medication 6: at 13:07

## 2021-08-17 RX ADMIN — Medication 2: at 17:10

## 2021-08-17 RX ADMIN — LISINOPRIL 10 MILLIGRAM(S): 2.5 TABLET ORAL at 06:53

## 2021-08-17 RX ADMIN — Medication 1 MILLIGRAM(S): at 01:38

## 2021-08-17 RX ADMIN — GABAPENTIN 300 MILLIGRAM(S): 400 CAPSULE ORAL at 06:53

## 2021-08-17 RX ADMIN — CEFTRIAXONE 100 MILLIGRAM(S): 500 INJECTION, POWDER, FOR SOLUTION INTRAMUSCULAR; INTRAVENOUS at 15:30

## 2021-08-17 RX ADMIN — Medication 1 PATCH: at 17:13

## 2021-08-17 RX ADMIN — Medication 100 MILLIGRAM(S): at 11:11

## 2021-08-17 RX ADMIN — PANTOPRAZOLE SODIUM 40 MILLIGRAM(S): 20 TABLET, DELAYED RELEASE ORAL at 06:53

## 2021-08-17 RX ADMIN — Medication 250 MILLIGRAM(S): at 06:53

## 2021-08-17 RX ADMIN — BUPRENORPHINE AND NALOXONE 1 TABLET(S): 2; .5 TABLET SUBLINGUAL at 21:20

## 2021-08-17 RX ADMIN — Medication 1 MILLIGRAM(S): at 14:27

## 2021-08-17 RX ADMIN — GABAPENTIN 300 MILLIGRAM(S): 400 CAPSULE ORAL at 13:07

## 2021-08-17 RX ADMIN — Medication 1 MILLIGRAM(S): at 17:10

## 2021-08-17 RX ADMIN — Medication 1 MILLIGRAM(S): at 11:11

## 2021-08-17 RX ADMIN — Medication 0.1 MILLIGRAM(S): at 06:53

## 2021-08-17 RX ADMIN — Medication 0.5 MILLIGRAM(S): at 21:37

## 2021-08-17 RX ADMIN — Medication 2: at 08:47

## 2021-08-17 NOTE — CHART NOTE - NSCHARTNOTEFT_GEN_A_CORE
Pt walked up  and dropped off work status forms please fill it out. Forms is been scanned in media and placed  in Dr Abbasi mailbox. Pt has a note from  until 01/01/2020, pt carlos enrique need another notes if he goes back to work after 01/01/2020. Please call pt with any other questions.   Called by Rn that pt has BP 19/107, HR=92  He is here for cellulitis and is on ativan taper for ETOH abuse  Per RN, pt to get ativan and feels that BP is related to withdrawal therefore will give catapres 0.1 mg x 1 dose

## 2021-08-17 NOTE — PROGRESS NOTE ADULT - TIME BILLING
Direct patient care.
Direct patient care.
I have personally seen and examined this patient.    I have reviewed all pertinent clinical information and reviewed all relevant imaging and diagnostic studies personally.   I counseled the patient about diagnostic testing and treatment plan. All questions were answered.   I discussed recommendations with the primary team.
Direct patient care.
I have personally seen and examined this patient.    I have reviewed all pertinent clinical information and reviewed all relevant imaging and diagnostic studies personally.   I counseled the patient about diagnostic testing and treatment plan. All questions were answered.   I discussed recommendations with the primary team.

## 2021-08-17 NOTE — PROGRESS NOTE ADULT - ASSESSMENT
50yo male whose PMH includes DM, neuropathy,  HTN and substance abuse (on Suboxone uses alcohol, uses tobacco) presents to ER due to developing redness to both legs.    B/L LE Cellulitis:  -Continue Vanco, stopped cefepime start Rocephin 8/16, DCed Flagyl.  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

## 2021-08-17 NOTE — PROGRESS NOTE ADULT - SUBJECTIVE AND OBJECTIVE BOX
GINO STEWART  51y, Male  Allergy: No Known Allergies      LOS  3d    CHIEF COMPLAINT: leg cellulitis (16 Aug 2021 13:29)      INTERVAL EVENTS/HPI  - No acute events overnight  - T(F): , Max: 98.4 (08-17-21 @ 05:27)  - Denies any worsening symptoms  - Tolerating medication  - WBC Count: 2.73 (08-17-21 @ 08:27)  WBC Count: 2.91 (08-16-21 @ 08:27)     - Creatinine, Serum: 0.6 (08-17-21 @ 08:27)  Creatinine, Serum: 0.7 (08-16-21 @ 08:27)       ROS  General: Denies rigors, nightsweats  HEENT: Denies headache, rhinorrhea, sore throat, eye pain  CV: Denies CP, palpitations  PULM: Denies wheezing, hemoptysis  GI: Denies hematemesis, hematochezia, melena  : Denies discharge, hematuria  MSK: Denies arthralgias, myalgias  SKIN: Denies rash, lesions  NEURO: Denies paresthesias, weakness  PSYCH: Denies depression, anxiety    VITALS:  T(F): 98.4, Max: 98.4 (08-17-21 @ 05:27)  HR: 92  BP: 169/91  RR: 16Vital Signs Last 24 Hrs  T(C): 36.9 (17 Aug 2021 05:27), Max: 36.9 (17 Aug 2021 05:27)  T(F): 98.4 (17 Aug 2021 05:27), Max: 98.4 (17 Aug 2021 05:27)  HR: 92 (17 Aug 2021 10:30) (80 - 92)  BP: 169/91 (17 Aug 2021 10:30) (118/65 - 186/102)  BP(mean): --  RR: 16 (17 Aug 2021 07:58) (16 - 18)  SpO2: --    PHYSICAL EXAM:  Gen: NAD, resting in bed  HEENT: Normocephalic, atraumatic  Neck: supple, no lymphadenopathy  CV: Regular rate & regular rhythm  Lungs: decreased BS at bases, no fremitus  Abdomen: Soft, BS present  Ext: Warm, well perfused  Neuro: non focal, awake  Skin: no rash, no erythema  Lines: no phlebitis    FH: Non-contributory  Social Hx: Non-contributory    TESTS & MEASUREMENTS:                        13.0   2.73  )-----------( 46       ( 17 Aug 2021 08:27 )             38.2     08-17    133<L>  |  96<L>  |  9<L>  ----------------------------<  190<H>  4.2   |  28  |  0.6<L>    Ca    8.6      17 Aug 2021 08:27    TPro  5.9<L>  /  Alb  3.4<L>  /  TBili  2.0<H>  /  DBili  x   /  AST  74<H>  /  ALT  21  /  AlkPhos  80  08-17    eGFR if Non African American: 116 mL/min/1.73M2 (08-17-21 @ 08:27)  eGFR if : 134 mL/min/1.73M2 (08-17-21 @ 08:27)    LIVER FUNCTIONS - ( 17 Aug 2021 08:27 )  Alb: 3.4 g/dL / Pro: 5.9 g/dL / ALK PHOS: 80 U/L / ALT: 21 U/L / AST: 74 U/L / GGT: x               Culture - Blood (collected 08-15-21 @ 11:40)  Source: .Blood None  Preliminary Report (08-16-21 @ 19:01):    No growth to date.    Culture - Blood (collected 08-15-21 @ 11:40)  Source: .Blood None  Preliminary Report (08-16-21 @ 19:01):    No growth to date.        Lactate, Blood: 1.8 mmol/L (08-15-21 @ 07:07)      INFECTIOUS DISEASES TESTING  Rapid RVP Result: NotDetec (08-14-21 @ 18:40)  HIV-1/2 Combo Result: Nonreact (07-29-21 @ 06:47)  Rapid RVP Result: NotDetec (07-27-21 @ 12:48)  COVID-19 PCR: NotDetec (07-24-21 @ 12:30)      INFLAMMATORY MARKERS      RADIOLOGY & ADDITIONAL TESTS:  I have personally reviewed the last available Chest xray  CXR  Xray Chest 1 View- PORTABLE-Urgent:   EXAM:  XR CHEST PORTABLE URGENT 1V            PROCEDURE DATE:  08/14/2021            INTERPRETATION:  Clinical History / Reason for exam: 51-year-old male with leg swelling.    Comparison : Chest radiograph performed 7/24/2021.    Technique/Positioning: Portable, AP view.  Lung volumes are decreased bilaterally.    Findings:    Support devices: None.    Cardiac/mediastinum/hilum: The cardiac silhouette is magnified.    Lung parenchyma/Pleura: No focal pulmonary opacity, pleural effusion or pneumothorax is seen.    Skeleton/soft tissues: There are thoracic spine degenerative changes.    Impression:    No radiographic evidence of acute cardiopulmonary disease.        --- End of Report ---              BETTY MASON MD; Attending Radiologist  This document has been electronically signed. Aug 15 2021  8:07AM (08-14-21 @ 21:21)      CT      CARDIOLOGY TESTING      MEDICATIONS  buprenorphine 8 mG/naloxone 2 mG SL  Tablet 1 SubLingual <User Schedule>  cefTRIAXone   IVPB 2000 IV Intermittent every 24 hours  dextrose 40% Gel 15 Oral once  dextrose 5%. 1000 IV Continuous <Continuous>  dextrose 5%. 1000 IV Continuous <Continuous>  dextrose 50% Injectable 25 IV Push once  dextrose 50% Injectable 12.5 IV Push once  dextrose 50% Injectable 25 IV Push once  enoxaparin Injectable 40 SubCutaneous daily  folic acid 1 Oral daily  gabapentin 300 Oral three times a day  glucagon  Injectable 1 IntraMuscular once  insulin lispro (ADMELOG) corrective regimen sliding scale  SubCutaneous three times a day before meals  insulin lispro (ADMELOG) corrective regimen sliding scale  SubCutaneous at bedtime  lisinopril 10 Oral daily  LORazepam     Tablet 1 Oral every 4 hours  LORazepam     Tablet 0.5 Oral every 4 hours  LORazepam     Tablet  Oral   nicotine - 21 mG/24Hr(s) Patch 1 Transdermal daily  pantoprazole    Tablet 40 Oral before breakfast  thiamine 100 Oral daily  vancomycin  IVPB 1000 IV Intermittent two times a day  zinc sulfate 220 Oral daily      WEIGHT  Weight (kg): 122.3 (08-14-21 @ 22:39)  Creatinine, Serum: 0.6 mg/dL (08-17-21 @ 08:27)      ANTIBIOTICS:  cefTRIAXone   IVPB 2000 milliGRAM(s) IV Intermittent every 24 hours  vancomycin  IVPB 1000 milliGRAM(s) IV Intermittent two times a day      All available historical records have been reviewed

## 2021-08-17 NOTE — PROGRESS NOTE ADULT - ASSESSMENT
Patient is a 51y old  Male whose PMH includes DM, neuropathy,  HTN and substance abuse (on Suboxone uses alcohol, uses tobacco) presents to ER for evaluation of developing redness to both legs. Associated with a burning pain sensation.  Denies fevers. These findings began about 1 week ago after a fall which resulted in abrasions to both legs. Also asking for Covid vaccine. He has started on Cefepime and IV Vancomycin , and the ID consult requested to assist with further evaluation and antibiotic management.     # RLE purulent cellulitis  # Substance abuse    Recommendations  - continue vancomycin 1000 mg q 12 hours -- please obtain trough prior to next dose , increase to 1.5 mg q 12 hours if less than 10   - ceftriaxone 2g daily  - keep LE elevated  - reports RLE swelling is slowly improving   - on discharge, can eventually switch to PO doxycycline 100 mg BID and Augmentin 875/125 mg BID (8/15-8/28)      Please call or message on [employer] Teams if with any questions.  Spectra [phone]

## 2021-08-17 NOTE — PROGRESS NOTE ADULT - SUBJECTIVE AND OBJECTIVE BOX
52yo male whose PMH includes DM, neuropathy,  HTN and substance abuse (on Suboxone uses alcohol, uses tobacco) presents to ER due to developing redness to both legs.    Today:  Seen at bedside, no new complaints.        REVIEW OF SYSTEMS:  No new complaints      MEDICATIONS  (STANDING):  buprenorphine 8 mG/naloxone 2 mG SL  Tablet 1 Tablet(s) SubLingual <User Schedule>  cefTRIAXone   IVPB 2000 milliGRAM(s) IV Intermittent every 24 hours  dextrose 40% Gel 15 Gram(s) Oral once  dextrose 5%. 1000 milliLiter(s) (50 mL/Hr) IV Continuous <Continuous>  dextrose 5%. 1000 milliLiter(s) (100 mL/Hr) IV Continuous <Continuous>  dextrose 50% Injectable 25 Gram(s) IV Push once  dextrose 50% Injectable 12.5 Gram(s) IV Push once  dextrose 50% Injectable 25 Gram(s) IV Push once  enoxaparin Injectable 40 milliGRAM(s) SubCutaneous daily  folic acid 1 milliGRAM(s) Oral daily  gabapentin 300 milliGRAM(s) Oral three times a day  glucagon  Injectable 1 milliGRAM(s) IntraMuscular once  insulin lispro (ADMELOG) corrective regimen sliding scale   SubCutaneous three times a day before meals  insulin lispro (ADMELOG) corrective regimen sliding scale   SubCutaneous at bedtime  lisinopril 10 milliGRAM(s) Oral daily  LORazepam     Tablet 1 milliGRAM(s) Oral every 4 hours  LORazepam     Tablet 0.5 milliGRAM(s) Oral every 4 hours  LORazepam     Tablet   Oral   nicotine - 21 mG/24Hr(s) Patch 1 patch Transdermal daily  pantoprazole    Tablet 40 milliGRAM(s) Oral before breakfast  thiamine 100 milliGRAM(s) Oral daily  vancomycin  IVPB 1750 milliGRAM(s) IV Intermittent every 12 hours  zinc sulfate 220 milliGRAM(s) Oral daily    MEDICATIONS  (PRN):  aluminum hydroxide/magnesium hydroxide/simethicone Suspension 30 milliLiter(s) Oral every 6 hours PRN Dyspepsia  simethicone 80 milliGRAM(s) Chew four times a day PRN Gas      Allergies  No Known Allergies        FAMILY HISTORY:  FH: alcohol abuse  dad        Vital Signs Last 24 Hrs  T(C): 36.9 (17 Aug 2021 05:27), Max: 36.9 (17 Aug 2021 05:27)  T(F): 98.4 (17 Aug 2021 05:27), Max: 98.4 (17 Aug 2021 05:27)  HR: 92 (17 Aug 2021 10:30) (80 - 92)  BP: 169/91 (17 Aug 2021 10:30) (118/65 - 186/102)  RR: 16 (17 Aug 2021 07:58) (16 - 18)      PHYSICAL EXAM:  GENERAL: NAD, well-groomed, well-developed  HEAD:  Atraumatic, Normocephalic  NECK: Supple, No JVD, Normal thyroid  NERVOUS SYSTEM:  Alert & Oriented X3, Good concentration  CHEST/LUNG: Clear to percussion bilaterally; No rales, rhonchi, wheezing, or rubs  HEART: Regular rate and rhythm; No murmurs, rubs, or gallops  ABDOMEN: Soft, Nontender, Nondistended; Bowel sounds present  EXTREMITIES:  erythema and abrasions b/l LE below knees      LABS:                        13.0   2.73  )-----------( 46       ( 17 Aug 2021 08:27 )             38.2     08-17    133<L>  |  96<L>  |  9<L>  ----------------------------<  190<H>  4.2   |  28  |  0.6<L>    Ca    8.6      17 Aug 2021 08:27    TPro  5.9<L>  /  Alb  3.4<L>  /  TBili  2.0<H>  /  DBili  x   /  AST  74<H>  /  ALT  21  /  AlkPhos  80  08-17

## 2021-08-18 LAB
GLUCOSE BLDC GLUCOMTR-MCNC: 188 MG/DL — HIGH (ref 70–99)
GLUCOSE BLDC GLUCOMTR-MCNC: 221 MG/DL — HIGH (ref 70–99)
GLUCOSE BLDC GLUCOMTR-MCNC: 229 MG/DL — HIGH (ref 70–99)
GLUCOSE BLDC GLUCOMTR-MCNC: 231 MG/DL — HIGH (ref 70–99)
GLUCOSE BLDC GLUCOMTR-MCNC: 246 MG/DL — HIGH (ref 70–99)
GLUCOSE BLDC GLUCOMTR-MCNC: 247 MG/DL — HIGH (ref 70–99)

## 2021-08-18 PROCEDURE — 99233 SBSQ HOSP IP/OBS HIGH 50: CPT

## 2021-08-18 RX ORDER — HYDROXYZINE HCL 10 MG
50 TABLET ORAL ONCE
Refills: 0 | Status: COMPLETED | OUTPATIENT
Start: 2021-08-18 | End: 2021-08-19

## 2021-08-18 RX ORDER — LISINOPRIL 2.5 MG/1
10 TABLET ORAL ONCE
Refills: 0 | Status: COMPLETED | OUTPATIENT
Start: 2021-08-18 | End: 2021-08-18

## 2021-08-18 RX ORDER — DIPHENHYDRAMINE HCL 50 MG
50 CAPSULE ORAL ONCE
Refills: 0 | Status: DISCONTINUED | OUTPATIENT
Start: 2021-08-18 | End: 2021-08-18

## 2021-08-18 RX ADMIN — Medication 4: at 16:40

## 2021-08-18 RX ADMIN — PANTOPRAZOLE SODIUM 40 MILLIGRAM(S): 20 TABLET, DELAYED RELEASE ORAL at 06:21

## 2021-08-18 RX ADMIN — GABAPENTIN 300 MILLIGRAM(S): 400 CAPSULE ORAL at 21:14

## 2021-08-18 RX ADMIN — LISINOPRIL 10 MILLIGRAM(S): 2.5 TABLET ORAL at 05:11

## 2021-08-18 RX ADMIN — BUPRENORPHINE AND NALOXONE 1 TABLET(S): 2; .5 TABLET SUBLINGUAL at 07:53

## 2021-08-18 RX ADMIN — Medication 0.5 MILLIGRAM(S): at 01:50

## 2021-08-18 RX ADMIN — LISINOPRIL 10 MILLIGRAM(S): 2.5 TABLET ORAL at 20:45

## 2021-08-18 RX ADMIN — Medication 0.5 MILLIGRAM(S): at 11:18

## 2021-08-18 RX ADMIN — Medication 1 PATCH: at 19:32

## 2021-08-18 RX ADMIN — Medication 0.5 MILLIGRAM(S): at 05:11

## 2021-08-18 RX ADMIN — Medication 1 MILLIGRAM(S): at 11:14

## 2021-08-18 RX ADMIN — Medication 0.5 MILLIGRAM(S): at 13:19

## 2021-08-18 RX ADMIN — GABAPENTIN 300 MILLIGRAM(S): 400 CAPSULE ORAL at 13:19

## 2021-08-18 RX ADMIN — BUPRENORPHINE AND NALOXONE 1 TABLET(S): 2; .5 TABLET SUBLINGUAL at 20:08

## 2021-08-18 RX ADMIN — Medication 1 PATCH: at 18:30

## 2021-08-18 RX ADMIN — Medication 250 MILLIGRAM(S): at 18:05

## 2021-08-18 RX ADMIN — Medication 1 PATCH: at 11:15

## 2021-08-18 RX ADMIN — Medication 100 MILLIGRAM(S): at 11:14

## 2021-08-18 RX ADMIN — Medication 250 MILLIGRAM(S): at 05:11

## 2021-08-18 RX ADMIN — ENOXAPARIN SODIUM 40 MILLIGRAM(S): 100 INJECTION SUBCUTANEOUS at 11:16

## 2021-08-18 RX ADMIN — Medication 0.5 MILLIGRAM(S): at 18:04

## 2021-08-18 RX ADMIN — Medication 4: at 07:49

## 2021-08-18 RX ADMIN — GABAPENTIN 300 MILLIGRAM(S): 400 CAPSULE ORAL at 05:11

## 2021-08-18 RX ADMIN — CEFTRIAXONE 100 MILLIGRAM(S): 500 INJECTION, POWDER, FOR SOLUTION INTRAMUSCULAR; INTRAVENOUS at 16:51

## 2021-08-18 RX ADMIN — Medication 2: at 11:44

## 2021-08-18 RX ADMIN — ZINC SULFATE TAB 220 MG (50 MG ZINC EQUIVALENT) 220 MILLIGRAM(S): 220 (50 ZN) TAB at 11:14

## 2021-08-18 NOTE — PROGRESS NOTE ADULT - ASSESSMENT
52yo male whose PMH includes DM, neuropathy,  HTN and substance abuse (on Suboxone uses alcohol, uses tobacco) presents to ER due to developing redness to both legs.    B/L LE Cellulitis:  -Continue Vanco, ceftriaonxe       On discharge, can eventually switch to PO doxycycline 100 mg BID and Augmentin 875/125 mg BID (8/15-8/28)  -Blood Cx NGTD  -Pt states he still has pain in RLE, although improving. Anticipate DC tomorrow. States there is noone home today to help him.    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

## 2021-08-18 NOTE — PROGRESS NOTE ADULT - SUBJECTIVE AND OBJECTIVE BOX
GINO STEWART  51y, Male  Allergy: No Known Allergies    Hospital Day: 4d    Patient seen and examined earlier today. No acute events overnight.    PMH/PSH:  PAST MEDICAL & SURGICAL HISTORY:  HTN (hypertension)    DM (diabetes mellitus)    Alcohol dependency    History of incision and drainage    VITALS:  T(F): 98.2 (08-18-21 @ 05:00), Max: 98.6 (08-17-21 @ 22:14)  HR: 82 (08-18-21 @ 05:00)  BP: 145/79 (08-18-21 @ 05:00) (145/79 - 154/101)  RR: 16 (08-18-21 @ 05:00)  SpO2: --    TESTS & MEASUREMENTS:  Weight (Kg):   BMI (kg/m2): 35.6 (08-14)                        13.0   2.73  )-----------( 46       ( 17 Aug 2021 08:27 )             38.2     08-17    133<L>  |  96<L>  |  9<L>  ----------------------------<  190<H>  4.2   |  28  |  0.6<L>    Ca    8.6      17 Aug 2021 08:27    TPro  5.9<L>  /  Alb  3.4<L>  /  TBili  2.0<H>  /  DBili  x   /  AST  74<H>  /  ALT  21  /  AlkPhos  80  08-17    LIVER FUNCTIONS - ( 17 Aug 2021 08:27 )  Alb: 3.4 g/dL / Pro: 5.9 g/dL / ALK PHOS: 80 U/L / ALT: 21 U/L / AST: 74 U/L / GGT: x           Culture - Blood (collected 08-15-21 @ 11:40)  Source: .Blood None  Preliminary Report (08-16-21 @ 19:01):    No growth to date.    Culture - Blood (collected 08-15-21 @ 11:40)  Source: .Blood None  Preliminary Report (08-16-21 @ 19:01):    No growth to date.    MEDICATIONS:  MEDICATIONS  (STANDING):  buprenorphine 8 mG/naloxone 2 mG SL  Tablet 1 Tablet(s) SubLingual <User Schedule>  cefTRIAXone   IVPB 2000 milliGRAM(s) IV Intermittent every 24 hours  dextrose 40% Gel 15 Gram(s) Oral once  dextrose 5%. 1000 milliLiter(s) (50 mL/Hr) IV Continuous <Continuous>  dextrose 5%. 1000 milliLiter(s) (100 mL/Hr) IV Continuous <Continuous>  dextrose 50% Injectable 25 Gram(s) IV Push once  dextrose 50% Injectable 12.5 Gram(s) IV Push once  dextrose 50% Injectable 25 Gram(s) IV Push once  enoxaparin Injectable 40 milliGRAM(s) SubCutaneous daily  folic acid 1 milliGRAM(s) Oral daily  gabapentin 300 milliGRAM(s) Oral three times a day  glucagon  Injectable 1 milliGRAM(s) IntraMuscular once  insulin lispro (ADMELOG) corrective regimen sliding scale   SubCutaneous three times a day before meals  insulin lispro (ADMELOG) corrective regimen sliding scale   SubCutaneous at bedtime  lisinopril 10 milliGRAM(s) Oral daily  LORazepam     Tablet 0.5 milliGRAM(s) Oral every 4 hours  LORazepam     Tablet   Oral   nicotine - 21 mG/24Hr(s) Patch 1 patch Transdermal daily  pantoprazole    Tablet 40 milliGRAM(s) Oral before breakfast  thiamine 100 milliGRAM(s) Oral daily  vancomycin  IVPB 1750 milliGRAM(s) IV Intermittent every 12 hours  zinc sulfate 220 milliGRAM(s) Oral daily    MEDICATIONS  (PRN):  aluminum hydroxide/magnesium hydroxide/simethicone Suspension 30 milliLiter(s) Oral every 6 hours PRN Dyspepsia  simethicone 80 milliGRAM(s) Chew four times a day PRN Gas    HOME MEDICATIONS:  buprenorphine-naloxone 8 mg-2 mg sublingual tablet (08-14)  lisinopril (08-14)  Zinc (08-14)    REVIEW OF SYSTEMS:  All other review of systems is negative unless indicated above.     PHYSICAL EXAM:  GENERAL: NAD  HEENT: No Swelling  CHEST/LUNG: Good air entry, No wheezing  HEART: RRR, No murmurs  ABDOMEN: Soft, Bowel sounds present  EXTREMITIES:  No clubbing, RLE erythema

## 2021-08-18 NOTE — PROGRESS NOTE ADULT - REASON FOR ADMISSION
leg cellulitis

## 2021-08-19 ENCOUNTER — TRANSCRIPTION ENCOUNTER (OUTPATIENT)
Age: 51
End: 2021-08-19

## 2021-08-19 VITALS
HEART RATE: 74 BPM | DIASTOLIC BLOOD PRESSURE: 87 MMHG | TEMPERATURE: 98 F | RESPIRATION RATE: 18 BRPM | SYSTOLIC BLOOD PRESSURE: 150 MMHG

## 2021-08-19 LAB
GLUCOSE BLDC GLUCOMTR-MCNC: 172 MG/DL — HIGH (ref 70–99)
GLUCOSE BLDC GLUCOMTR-MCNC: 235 MG/DL — HIGH (ref 70–99)
GLUCOSE BLDC GLUCOMTR-MCNC: 244 MG/DL — HIGH (ref 70–99)
ZINC SERPL-MCNC: 48 UG/DL — SIGNIFICANT CHANGE UP (ref 44–115)

## 2021-08-19 PROCEDURE — 99238 HOSP IP/OBS DSCHRG MGMT 30/<: CPT

## 2021-08-19 PROCEDURE — 71100 X-RAY EXAM RIBS UNI 2 VIEWS: CPT | Mod: 26,LT

## 2021-08-19 RX ORDER — METFORMIN HYDROCHLORIDE 850 MG/1
1 TABLET ORAL
Qty: 60 | Refills: 0
Start: 2021-08-19 | End: 2021-09-17

## 2021-08-19 RX ADMIN — LISINOPRIL 10 MILLIGRAM(S): 2.5 TABLET ORAL at 05:20

## 2021-08-19 RX ADMIN — BUPRENORPHINE AND NALOXONE 1 TABLET(S): 2; .5 TABLET SUBLINGUAL at 08:13

## 2021-08-19 RX ADMIN — GABAPENTIN 300 MILLIGRAM(S): 400 CAPSULE ORAL at 14:57

## 2021-08-19 RX ADMIN — Medication 4: at 08:10

## 2021-08-19 RX ADMIN — ENOXAPARIN SODIUM 40 MILLIGRAM(S): 100 INJECTION SUBCUTANEOUS at 11:47

## 2021-08-19 RX ADMIN — Medication 4: at 11:46

## 2021-08-19 RX ADMIN — Medication 1 PATCH: at 14:22

## 2021-08-19 RX ADMIN — Medication 1 PATCH: at 11:48

## 2021-08-19 RX ADMIN — Medication 50 MILLIGRAM(S): at 00:16

## 2021-08-19 RX ADMIN — Medication 1 MILLIGRAM(S): at 11:49

## 2021-08-19 RX ADMIN — Medication 250 MILLIGRAM(S): at 05:19

## 2021-08-19 RX ADMIN — PANTOPRAZOLE SODIUM 40 MILLIGRAM(S): 20 TABLET, DELAYED RELEASE ORAL at 05:19

## 2021-08-19 RX ADMIN — GABAPENTIN 300 MILLIGRAM(S): 400 CAPSULE ORAL at 05:20

## 2021-08-19 RX ADMIN — ZINC SULFATE TAB 220 MG (50 MG ZINC EQUIVALENT) 220 MILLIGRAM(S): 220 (50 ZN) TAB at 11:48

## 2021-08-19 RX ADMIN — Medication 100 MILLIGRAM(S): at 11:48

## 2021-08-19 NOTE — DISCHARGE NOTE NURSING/CASE MANAGEMENT/SOCIAL WORK - PATIENT PORTAL LINK FT
You can access the FollowMyHealth Patient Portal offered by [facility] by registering at the following website: [email or web address]. By joining [facility]’s FollowMyHealth portal, you will also be able to view your health information using other applications (apps) compatible with our system.

## 2021-08-19 NOTE — DISCHARGE NOTE PROVIDER - NSDCMRMEDTOKEN_GEN_ALL_CORE_FT
Augmentin 875 mg-125 mg oral tablet: 1 tab(s) orally 2 times a day   baclofen 10 mg oral tablet: 1 tab(s) orally 3 times a day  buprenorphine-naloxone 8 mg-2 mg sublingual tablet:  sublingual   buprenorphine-naloxone 8 mg-2 mg sublingual tablet: 1 tab(s) sublingually once a day MDD:1 tablet  doxycycline hyclate 100 mg oral tablet: 1 tab(s) orally 2 times a day   folic acid 1 mg oral tablet: 1 tab(s) orally once a day  gabapentin 300 mg oral capsule: 1 cap(s) orally every 8 hours   lisinopril:   metFORMIN 500 mg oral tablet: 1 tab(s) orally 2 times a day   Protonix 40 mg oral delayed release tablet: 1 tab(s) orally once a day   thiamine 100 mg oral tablet: 1 tab(s) orally once a day  Zinc: orally once a day

## 2021-08-19 NOTE — DISCHARGE NOTE NURSING/CASE MANAGEMENT/SOCIAL WORK - NSDCPEFALRISK_GEN_ALL_CORE
For information on Fall & injury Prevention, visit [email or web address]

## 2021-08-19 NOTE — DISCHARGE NOTE PROVIDER - CARE PROVIDER_API CALL
Gregor Gabriel  INTERNAL MEDICINE  [address] [place] [id number]  Phone: [phone]  Fax: [phone]  Follow Up Time: 2 weeks

## 2021-08-19 NOTE — DISCHARGE NOTE PROVIDER - NSDCCPCAREPLAN_GEN_ALL_CORE_FT
PRINCIPAL DISCHARGE DIAGNOSIS  Diagnosis: Cellulitis  Assessment and Plan of Treatment: Complete oral doxycycline 100 mg twice a day and Augmentin 875/125 mg twice a day as prescribed. Follow up with your PCP.

## 2021-08-19 NOTE — DISCHARGE NOTE NURSING/CASE MANAGEMENT/SOCIAL WORK - NSDCPEEMAIL_GEN_ALL_CORE
[facility] for Tobacco Control email [email or web address]

## 2021-08-19 NOTE — DISCHARGE NOTE PROVIDER - HOSPITAL COURSE
52yo male whose PMH includes DM, neuropathy,  HTN and substance abuse (on Suboxone uses alcohol, uses tobacco) presents to ER due to developing redness to both legs.  Pt was treated for cellulitis with course of IV antibiotics per ID recommendations with improvement in symptoms. He was also treated for alcohol withdrawal and completed course of ativan taper. Pt is medically stable for discharge to complete course of PO antibiotics outpatient.

## 2021-08-19 NOTE — DISCHARGE NOTE NURSING/CASE MANAGEMENT/SOCIAL WORK - NSDCVIVACCINE_GEN_ALL_CORE_FT
influenza, injectable, quadrivalent, preservative free; 17-Oct-2019 12:50; Rose Oliveros (RN); GlaxTidbitDotCoine; 3bs44 (Exp. Date: 30-Jun-2020); IntraMuscular; Deltoid Left.; 0.5 milliLiter(s); VIS (VIS Published: 15-Aug-2019, VIS Presented: 17-Oct-2019);   Tdap; 11-Oct-2019 08:07; Clark Rodriguez); [employer]; [id number] (Exp. Date: 22-Oct-2021); IntraMuscular; Deltoid Left.; 0.5 milliLiter(s); VIS (VIS Published: 09-May-2013, VIS Presented: 11-Oct-2019);

## 2021-08-19 NOTE — DISCHARGE NOTE NURSING/CASE MANAGEMENT/SOCIAL WORK - NSDCPEWEB_GEN_ALL_CORE
[employer] for Tobacco Control website --- [email or web address] website --- [email or web address]

## 2021-08-20 LAB
CULTURE RESULTS: SIGNIFICANT CHANGE UP
CULTURE RESULTS: SIGNIFICANT CHANGE UP
SPECIMEN SOURCE: SIGNIFICANT CHANGE UP
SPECIMEN SOURCE: SIGNIFICANT CHANGE UP

## 2021-08-24 DIAGNOSIS — I10 ESSENTIAL (PRIMARY) HYPERTENSION: ICD-10-CM

## 2021-08-24 DIAGNOSIS — F17.200 NICOTINE DEPENDENCE, UNSPECIFIED, UNCOMPLICATED: ICD-10-CM

## 2021-08-24 DIAGNOSIS — F10.139 ALCOHOL ABUSE WITH WITHDRAWAL, UNSPECIFIED: ICD-10-CM

## 2021-08-24 DIAGNOSIS — L03.115 CELLULITIS OF RIGHT LOWER LIMB: ICD-10-CM

## 2021-08-24 DIAGNOSIS — Z79.84 LONG TERM (CURRENT) USE OF ORAL HYPOGLYCEMIC DRUGS: ICD-10-CM

## 2021-08-24 DIAGNOSIS — E11.40 TYPE 2 DIABETES MELLITUS WITH DIABETIC NEUROPATHY, UNSPECIFIED: ICD-10-CM

## 2021-08-24 DIAGNOSIS — L03.116 CELLULITIS OF LEFT LOWER LIMB: ICD-10-CM

## 2021-11-06 NOTE — DISCHARGE NOTE PROVIDER - TIME BILLING
For clinical care, patient education and care coordination.  EOS calculated successfully. EOS Risk Factor: 0.5/1000 live births ([facility] national incidence); GA=34w5d; Temp=99.32; ROM=13.933; GBS='Unknown'; Antibiotics='GBS specific antibiotics > 2 hrs prior to birth'

## 2022-01-02 ENCOUNTER — EMERGENCY (EMERGENCY)
Facility: HOSPITAL | Age: 52
LOS: 0 days | Discharge: HOME | End: 2022-01-02
Attending: STUDENT IN AN ORGANIZED HEALTH CARE EDUCATION/TRAINING PROGRAM | Admitting: STUDENT IN AN ORGANIZED HEALTH CARE EDUCATION/TRAINING PROGRAM
Payer: MEDICAID

## 2022-01-02 VITALS
DIASTOLIC BLOOD PRESSURE: 80 MMHG | HEART RATE: 91 BPM | OXYGEN SATURATION: 99 % | RESPIRATION RATE: 18 BRPM | SYSTOLIC BLOOD PRESSURE: 144 MMHG

## 2022-01-02 VITALS
HEIGHT: 73 IN | SYSTOLIC BLOOD PRESSURE: 147 MMHG | HEART RATE: 98 BPM | WEIGHT: 199.96 LBS | RESPIRATION RATE: 17 BRPM | DIASTOLIC BLOOD PRESSURE: 89 MMHG | OXYGEN SATURATION: 100 % | TEMPERATURE: 98 F
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
## 2022-01-02 DIAGNOSIS — F10.129 ALCOHOL ABUSE WITH INTOXICATION, UNSPECIFIED: ICD-10-CM

## 2022-01-02 DIAGNOSIS — Z98.890 OTHER SPECIFIED POSTPROCEDURAL STATES: Chronic | ICD-10-CM

## 2022-01-02 DIAGNOSIS — Z79.84 LONG TERM (CURRENT) USE OF ORAL HYPOGLYCEMIC DRUGS: ICD-10-CM

## 2022-01-02 DIAGNOSIS — F17.200 NICOTINE DEPENDENCE, UNSPECIFIED, UNCOMPLICATED: ICD-10-CM
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
## 2022-01-02 DIAGNOSIS — E11.9 TYPE 2 DIABETES MELLITUS WITHOUT COMPLICATIONS: ICD-10-CM

## 2022-01-02 DIAGNOSIS — S09.90XA UNSPECIFIED INJURY OF HEAD, INITIAL ENCOUNTER: ICD-10-CM

## 2022-01-02 DIAGNOSIS — I10 ESSENTIAL (PRIMARY) HYPERTENSION: ICD-10-CM

## 2022-01-02 DIAGNOSIS — S00.03XA CONTUSION OF SCALP, INITIAL ENCOUNTER: ICD-10-CM

## 2022-01-02 DIAGNOSIS — F10.10 ALCOHOL ABUSE, UNCOMPLICATED: ICD-10-CM

## 2022-01-02 PROBLEM — F10.20 ALCOHOL DEPENDENCE, UNCOMPLICATED: Chronic | Status: ACTIVE | Noted: 2021-08-14

## 2022-01-02 PROCEDURE — 99285 EMERGENCY DEPT VISIT HI MDM: CPT

## 2022-01-02 PROCEDURE — 70450 CT HEAD/BRAIN W/O DYE: CPT | Mod: 26,MA

## 2022-01-02 PROCEDURE — 72125 CT NECK SPINE W/O DYE: CPT | Mod: 26,MA

## 2022-01-02 NOTE — ED PROVIDER NOTE - PROGRESS NOTE DETAILS
BK: Patient tolerating PO without any nausea or vomiting. Resting comfortably in stretcher. BK: Patient now ambulating without any gait abnormalities or dizziness. Agreeable for discharge with detox and concussion clinic f/u.

## 2022-01-02 NOTE — ED PROVIDER NOTE - NSFOLLOWUPCLINICS_GEN_ALL_ED_FT
[facility] Concussion Program  Concussion Program  [address], [place]   Phone: [phone]  Fax:   Follow Up Time: 1-3 Days    [facility] Detox Mgmt Clinic  Detox Mgmt  [address] [place] [address]  Phone: [phone]  Fax:   Follow Up Time: 1-3 Days

## 2022-01-02 NOTE — ED PROVIDER NOTE - NSFOLLOWUPINSTRUCTIONS_ED_ALL_ED_FT
Alcohol Abuse    Alcohol intoxication occurs when the amount of alcohol that a person has consumed impairs his or her ability to mentally and physically function. Chronic alcohol consumption can also lead to a variety of health issues including neurological disease, stomach disease, heart disease, liver disease, etc. Do not drive after drinking alcohol. Drinking enough alcohol to end up in an Emergency Room suggests you may have an alcohol abuse problem. Seek help at a drug addiction center.    SEEK IMMEDIATE MEDICAL CARE IF YOU HAVE ANY OF THE FOLLOWING SYMPTOMS: seizures, vomiting blood, blood in your stool, lightheadedness/dizziness, or becoming shaky to tremulous when you stop drinking.    Closed Head Injury    Closed head injury in an injury to your head that may or may not involve a traumatic brain injury (TBI). Symptoms of TBI can be short or long lasting and include headache, dizziness, interference with memory or speech, fatigue, confusion, changes in sleep, mood changes, nausea, depression/anxiety, and dulling of senses. Make sure to obtain proper rest which includes getting plenty of sleep, avoiding excessive visual stimulation, and avoiding activities that may cause physical or mental stress. Avoid any situation where there is potential for another head injury including sports.    SEEK MEDICAL CARE IF YOU HAVE THE FOLLOWING SYMPTOMS: unusual drowsiness, vomiting, severe dizziness, seizures, lightheadedness, muscular weakness, different pupil sizes, visual changes, or clear or bloody discharge from your ears or nose.    Hematoma    A hematoma is a collection of blood under the skin, in an organ, in a body space, in a joint space, or in other tissue. The blood can thicken (clot) to form a lump that you can see and feel. The lump is often firm and may become sore and tender. Most hematomas get better in a few days to weeks. However, some hematomas may be serious and require medical care. Hematomas can range from very small to very large.    What are the causes?  This condition is caused by:  •A blunt or penetrating injury.  •A leakage from a blood vessel under the skin.  •Some medical procedures, including surgeries, such as oral surgery, face lifts, and surgeries on the joints.  •Some medical conditions that cause bleeding or bruising. There may be multiple hematomas that appear in different areas of the body.    What increases the risk?  You are more likely to develop this condition if:  •You are an older adult.  •You use blood thinners.    What are the signs or symptoms?     Symptoms of this condition depend on where the hematoma is located.   Common symptoms of a hematoma that is under the skin include:  •A firm lump on the body.  •Pain and tenderness in the area.  •Bruising. Blue, dark blue, purple-red, or yellowish skin (discoloration) may appear at the site of the hematoma if the hematoma is close to the surface of the skin.    Common symptoms of a hematoma that is deep in the tissues or body spaces may be less obvious. They include:  •A collection of blood in the stomach (intra-abdominal hematoma). This may cause pain in the abdomen, weakness, fainting, and shortness of breath.   •A collection of blood in the head (intracranial hematoma). This may cause a headache or symptoms such as weakness, trouble speaking or understanding, or a change in consciousness.     How is this diagnosed?  This condition is diagnosed based on:  •Your medical history.  •A physical exam.  •Imaging tests, such as an ultrasound or CT scan. These may be needed if your health care provider suspects a hematoma in deeper tissues or body spaces.  •Blood tests. These may be needed if your health care provider believes that the hematoma is caused by a medical condition.    How is this treated?  Treatment for this condition depends on the cause, size, and location of the hematoma. Treatment may include:  •Doing nothing. The majority of hematomas do not need treatment as many of them go away on their own over time.  •Surgery or close monitoring. This may be needed for large hematomas or hematomas that affect vital organs.  •Medicines. Medicines may be given if there is an underlying medical cause for the hematoma.    Follow these instructions at home:    Managing pain, stiffness, and swelling    •If directed, put ice on the affected area.  •Put ice in a plastic bag.  •Place a towel between your skin and the bag.  •Leave the ice on for 20 minutes, 2–3 times a day for the first couple of days.  •If directed, apply heat to the affected area after applying ice for a couple of days. Use the heat source that your health care provider recommends, such as a moist heat pack or a heating pad.  •Place a towel between your skin and the heat source.   •Leave the heat on for 20–30 minutes.   •Remove the heat if your skin turns bright red. This is especially important if you are unable to feel pain, heat, or cold. You may have a greater risk of getting burned.  •Raise (elevate) the affected area above the level of your heart while you are sitting or lying down.  •If told, wrap the affected area with an elastic bandage. The bandage applies pressure (compression) to the area, which may help to reduce swelling and promote healing. Do not wrap the bandage too tightly around the affected area.  •If your hematoma is on a leg or foot (lower extremity) and is painful, your health care provider may recommend crutches. Use them as told by your health care provider.    General instructions     •Take over-the-counter and prescription medicines only as told by your health care provider.  •Keep all follow-up visits as told by your health care provider. This is important.    Contact a health care provider if:  •You have a fever.  •The swelling or discoloration gets worse.  •You develop more hematomas.    Get help right away if:  •Your pain is worse or your pain is not controlled with medicine.  •Your skin over the hematoma breaks or starts bleeding.  •Your hematoma is in your chest or abdomen and you have weakness, shortness of breath, or a change in consciousness.  •You have a hematoma on your scalp that is caused by a fall or injury, and you also have:  •A headache that gets worse.  •Trouble speaking or understanding speech.   •Weakness.  •Change in alertness or consciousness.    Summary  •A hematoma is a collection of blood under the skin, in an organ, in a body space, in a joint space, or in other tissue.  •This condition usually does not need treatment because many hematomas go away on their own over time.  •Large hematomas, or those that may affect vital organs, may need surgical drainage or monitoring. If the hematoma is caused by a medical condition, medicines may be prescribed.  •Get help right away if your hematoma breaks or starts to bleed, you have shortness of breath, or you have a headache or trouble speaking after a fall.

## 2022-01-02 NOTE — ED PROVIDER NOTE - ATTENDING CONTRIBUTION TO CARE
52 yo M pmh dm, htn, polysubstance abuse brought in after being found sleeping outside. Pt endorses drinking vodka tonight and woke up outside. Unknown if he fell, unknown head trauma, unknown loc. Currently endorsing no sx. No n/v/d, no cp, no sob, no abd pain, no ha, no neck pain, no extremity pain.     CONSTITUTIONAL: Well-developed; well-nourished; in no acute distress. Sitting up and providing appropriate history and physical examination  SKIN: skin exam is warm and dry, no acute rash.  HEAD: +scalp hematoma. Normocephalic.  EYES: PERRL, 3 mm bilateral, no nystagmus, EOM intact; conjunctiva and sclera clear.  ENT: No nasal discharge; airway clear.  NECK: Supple; non tender. + full passive ROM in all directions. No JVD  CARD: S1, S2 normal; no murmurs, gallops, or rubs. Regular rate and rhythm. + Symmetric Strong Pulses  RESP: No wheezes, rales or rhonchi. Good air movement bilaterally  ABD: soft; non-distended; non-tender. No Rebound, No Guarding, No signs of peritonitis, No CVA tenderness. No pulsatile abdominal mass. + Strong and Symmetric Pulses  EXT: Normal ROM. No clubbing, cyanosis or edema. Dp and Pt Pulses intact. Cap refill less than 3 seconds  NEURO: CN 2-12 intact, normal finger to nose, normal romberg, stable gait, no sensory or motor deficits, Alert, oriented, grossly unremarkable. No Focal deficits. GCS 15. NIH 0  PSYCH: Cooperative, appropriate.

## 2022-01-02 NOTE — ED PROVIDER NOTE - CLINICAL SUMMARY MEDICAL DECISION MAKING FREE TEXT BOX
I personally evaluated the patient. I reviewed the Resident’s or Physician Assistant’s note (as assigned above), and agree with the findings and plan except as documented in my note. Patient evaluated for etoh intoxication. CT head and neck performed in the ED. Pt awake, endorses drinking etoh, no si/hi. Ambulatory in the ED with steady gait. I have fully discussed the medical management and delivery of care with the patient. I have discussed any available labs, imaging and treatment options with the patient. Patient confirms understanding and has been given detailed return precautions. Patient instructed to return to the ED should symptoms persist or worsen. Patient has demonstrated capacity and has verbalized understanding. Patient is well appearing upon discharge.

## 2022-01-02 NOTE — ED PROVIDER NOTE - PATIENT PORTAL LINK FT
You can access the FollowMyHealth Patient Portal offered by [facility] by registering at the following website: [email or web address]. By joining [facility]’s FollowMyHealth portal, you will also be able to view your health information using other applications (apps) compatible with our system.

## 2022-01-02 NOTE — ED PROVIDER NOTE - PHYSICAL EXAMINATION
CONSTITUTIONAL: well-appearing, in NAD  SKIN: Warm dry, normal skin turgor  HEAD: NCAT  EYES: EOMI, PERRLA, no scleral icterus, conjunctiva pink  ENT: normal pharynx with no erythema or exudates  NECK: Supple; non tender. Full ROM.  CARD: RRR, no murmurs.  RESP: clear to ausculation b/l. No crackles or wheezing.  ABD: soft, non-tender, non-distended, no rebound or guarding.  EXT: Full ROM, no bony tenderness, no pedal edema, no calf tenderness  NEURO: normal motor. normal sensory. CN II-XII intact. Cerebellar testing normal. Normal gait.  PSYCH: Cooperative, appropriate. CONSTITUTIONAL: well-appearing, in NAD  SKIN: Warm dry, normal skin turgor  HEAD: NC; posterolateral scalp hematoma without abrasion or laceration; no raccoon eyes, hemotympanum or calabrese sign  EYES: EOMI, PERRLA, no scleral icterus, conjunctiva pink  ENT: normal pharynx with no erythema or exudates  NECK: Supple; non tender. Full ROM. no c/t/l/s tenderness   CARD: RRR, no murmurs.  RESP: clear to ausculation b/l. No crackles or wheezing.  ABD: soft, non-tender, non-distended, no rebound or guarding.  EXT: Full ROM, no bony tenderness, no pedal edema, no calf tenderness; DP and radial pulses 2+ b/l  NEURO: normal motor. normal sensory. CN II-XII intact. Cerebellar testing normal. Normal gait.  PSYCH: Cooperative, appropriate.

## 2022-01-02 NOTE — ED ADULT NURSE NOTE - NSIMPLEMENTINTERV_GEN_ALL_ED
Implemented All Fall with Harm Risk Interventions:  [place] to call system. Call bell, personal items and telephone within reach. Instruct patient to call for assistance. Room bathroom lighting operational. Non-slip footwear when patient is off stretcher. Physically safe environment: no spills, clutter or unnecessary equipment. Stretcher in lowest position, wheels locked, appropriate side rails in place. Provide visual cue, wrist band, yellow gown, etc. Monitor gait and stability. Monitor for mental status changes and reorient to person, place, and time. Review medications for side effects contributing to fall risk. Reinforce activity limits and safety measures with patient and family. Provide visual clues: red socks.

## 2022-01-02 NOTE — ED PROVIDER NOTE - CARE PLAN
1 Principal Discharge DX:	Alcohol abuse  Secondary Diagnosis:	Closed head injury  Secondary Diagnosis:	Hematoma

## 2022-01-06 ENCOUNTER — EMERGENCY (EMERGENCY)
Facility: HOSPITAL | Age: 52
LOS: 0 days | Discharge: HOME | End: 2022-01-07
Attending: EMERGENCY MEDICINE | Admitting: EMERGENCY MEDICINE
Payer: MEDICAID

## 2022-01-06 VITALS
WEIGHT: 233.91 LBS | SYSTOLIC BLOOD PRESSURE: 167 MMHG | OXYGEN SATURATION: 97 % | HEART RATE: 81 BPM | RESPIRATION RATE: 17 BRPM | TEMPERATURE: 97 F | HEIGHT: 73 IN | DIASTOLIC BLOOD PRESSURE: 76 MMHG

## 2022-01-06 DIAGNOSIS — F10.929 ALCOHOL USE, UNSPECIFIED WITH INTOXICATION, UNSPECIFIED: ICD-10-CM

## 2022-01-06 DIAGNOSIS — I10 ESSENTIAL (PRIMARY) HYPERTENSION: ICD-10-CM

## 2022-01-06 DIAGNOSIS — E11.65 TYPE 2 DIABETES MELLITUS WITH HYPERGLYCEMIA: ICD-10-CM

## 2022-01-06 DIAGNOSIS — R74.8 ABNORMAL LEVELS OF OTHER SERUM ENZYMES: ICD-10-CM

## 2022-01-06 DIAGNOSIS — R74.01 ELEVATION OF LEVELS OF LIVER TRANSAMINASE LEVELS: ICD-10-CM

## 2022-01-06 DIAGNOSIS — F10.20 ALCOHOL DEPENDENCE, UNCOMPLICATED: ICD-10-CM

## 2022-01-06 DIAGNOSIS — Z79.84 LONG TERM (CURRENT) USE OF ORAL HYPOGLYCEMIC DRUGS: ICD-10-CM

## 2022-01-06 DIAGNOSIS — Z98.890 OTHER SPECIFIED POSTPROCEDURAL STATES: Chronic | ICD-10-CM

## 2022-01-06 PROCEDURE — 99284 EMERGENCY DEPT VISIT MOD MDM: CPT

## 2022-01-06 NOTE — ED ADULT NURSE NOTE - NSIMPLEMENTINTERV_GEN_ALL_ED
Implemented All Fall Risk Interventions:  [place] to call system. Call bell, personal items and telephone within reach. Instruct patient to call for assistance. Room bathroom lighting operational. Non-slip footwear when patient is off stretcher. Physically safe environment: no spills, clutter or unnecessary equipment. Stretcher in lowest position, wheels locked, appropriate side rails in place. Provide visual cue, wrist band, yellow gown, etc. Monitor gait and stability. Monitor for mental status changes and reorient to person, place, and time. Review medications for side effects contributing to fall risk. Reinforce activity limits and safety measures with patient and family.

## 2022-01-06 NOTE — ED ADULT NURSE NOTE - PRO INTERPRETER NEED 2
English

## 2022-01-06 NOTE — ED ADULT NURSE NOTE - NSSUHOSCREENINGYN_ED_ALL_ED
Single     Spouse name: N/A    Number of children: N/A    Years of education: N/A     Social History Main Topics    Smoking status: Never Smoker    Smokeless tobacco: Never Used    Alcohol use No    Drug use: No    Sexual activity: Yes     Partners: Male     Other Topics Concern    None     Social History Narrative    None       SCREENINGS             PHYSICAL EXAM    (up to 7 for level 4, 8 or more for level 5)     ED Triage Vitals [08/04/18 1345]   BP Temp Temp Source Pulse Resp SpO2 Height Weight   118/74 98.5 °F (36.9 °C) Oral 99 18 99 % 5' 1\" (1.549 m) 118 lb (53.5 kg)       Physical Exam   Constitutional: She is oriented to person, place, and time. She appears well-developed and well-nourished. HENT:   Head: Normocephalic. Eyes: Pupils are equal, round, and reactive to light. Neck: Normal range of motion. Neck supple. No JVD present. No tracheal deviation present. Cardiovascular: Normal rate. Pulmonary/Chest: Effort normal and breath sounds normal. No respiratory distress. She has no wheezes. She has no rales. She exhibits no tenderness. Abdominal: Soft. Bowel sounds are normal. She exhibits no distension and no mass. There is no tenderness. There is no rebound and no guarding. Musculoskeletal: Normal range of motion. She exhibits no edema or deformity. No CVA tenderness   Neurological: She is alert and oriented to person, place, and time. Coordination normal.   Skin: Skin is warm and dry. Psychiatric: She has a normal mood and affect.        DIAGNOSTIC RESULTS     EKG: All EKG's are interpreted by the Emergency Department Physician who either signs or Co-signs this chart in the absence of a cardiologist.        RADIOLOGY:   Non-plain film images such as CT, Ultrasound and MRI are read by the radiologist. Plain radiographic images are visualized and preliminarily interpreted by the emergency physician with the below findings:    KUB shows nonspecific gas pattern mild and Zofran for nausea she is advised to follow-up with her regular family physician through the family [facility] and return to the ER if her symptoms worsen. CRITICAL CARE TIME   Total Critical Care time was 0 minutes, excluding separately reportable procedures. There was a high probability of clinically significant/life threatening deterioration in the patient's condition which required my urgent intervention. CONSULTS:  None    PROCEDURES:  Unless otherwise noted below, none     Procedures    FINAL IMPRESSION      1. Non-intractable vomiting with nausea, unspecified vomiting type    2. Constipation, unspecified constipation type    3.  Urinary tract infection without hematuria, site unspecified          DISPOSITION/PLAN   DISPOSITION Decision To Discharge 08/04/2018 03:51:42 PM      PATIENT REFERRED TO:  [address] [place] [address]    In 2 days        DISCHARGE MEDICATIONS:  New Prescriptions    MAGNESIUM CITRATE (CITROMA) SOLN    Take 150 mLs by mouth once for 1 dose    NITROFURANTOIN, MACROCRYSTAL-MONOHYDRATE, (MACROBID) 100 MG CAPSULE    Take 1 capsule by mouth 2 times daily for 5 days    ONDANSETRON (ZOFRAN) 4 MG TABLET    Take 1 tablet by mouth every 8 hours as needed for Nausea          (Please note that portions of this note were completed with a voice recognition program.  Efforts were made to edit the dictations but occasionally words are mis-transcribed.)    Mushtaq Ryan PA-C (electronically signed)  Attending Emergency Physician         Mushtaq Ryan PA-C  08/04/18 2168 Yes - the patient is able to be screened

## 2022-01-06 NOTE — ED ADULT NURSE NOTE - EXTENSIONS OF SELF_ADULT
[facility]  [place] Discharge Summary                                                                             Name:  Damion Segura  :  2019  Hospital Day:  2  MRN:  [id number]  Attending:  Fabeinne Lee MD      Date of Delivery:   09/22/2018 1.70  Corrected   09/21/2018 2.00  Final   09/21/2018 1.40  Final         Discharge Weight: Wt Readings from Last 1 E None

## 2022-01-07 VITALS
HEIGHT: 73 IN | TEMPERATURE: 97 F | DIASTOLIC BLOOD PRESSURE: 68 MMHG | HEART RATE: 85 BPM | OXYGEN SATURATION: 95 % | RESPIRATION RATE: 18 BRPM | SYSTOLIC BLOOD PRESSURE: 100 MMHG | WEIGHT: 255.07 LBS

## 2022-01-07 VITALS
RESPIRATION RATE: 18 BRPM | OXYGEN SATURATION: 97 % | SYSTOLIC BLOOD PRESSURE: 121 MMHG | DIASTOLIC BLOOD PRESSURE: 61 MMHG | HEART RATE: 86 BPM

## 2022-01-07 DIAGNOSIS — F19.19 OTHER PSYCHOACTIVE SUBSTANCE ABUSE WITH UNSPECIFIED PSYCHOACTIVE SUBSTANCE-INDUCED DISORDER: ICD-10-CM

## 2022-01-07 DIAGNOSIS — Z79.84 LONG TERM (CURRENT) USE OF ORAL HYPOGLYCEMIC DRUGS: ICD-10-CM
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
## 2022-01-07 DIAGNOSIS — I10 ESSENTIAL (PRIMARY) HYPERTENSION: ICD-10-CM

## 2022-01-07 DIAGNOSIS — E11.9 TYPE 2 DIABETES MELLITUS WITHOUT COMPLICATIONS: ICD-10-CM

## 2022-01-07 DIAGNOSIS — S60.511A ABRASION OF RIGHT HAND, INITIAL ENCOUNTER: ICD-10-CM
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
## 2022-01-07 DIAGNOSIS — F10.929 ALCOHOL USE, UNSPECIFIED WITH INTOXICATION, UNSPECIFIED: ICD-10-CM

## 2022-01-07 DIAGNOSIS — Z98.890 OTHER SPECIFIED POSTPROCEDURAL STATES: Chronic | ICD-10-CM

## 2022-01-07 LAB
ANION GAP SERPL CALC-SCNC: 20 MMOL/L — HIGH (ref 7–14)
B-OH-BUTYR SERPL-SCNC: 0.3 MMOL/L — SIGNIFICANT CHANGE UP
BASE EXCESS BLDV CALC-SCNC: 2.5 MMOL/L — SIGNIFICANT CHANGE UP (ref -2–3)
BASOPHILS # BLD AUTO: 0.02 K/UL — SIGNIFICANT CHANGE UP (ref 0–0.2)
BASOPHILS NFR BLD AUTO: 0.5 % — SIGNIFICANT CHANGE UP (ref 0–1)
BUN SERPL-MCNC: 11 MG/DL — SIGNIFICANT CHANGE UP (ref 10–20)
CA-I SERPL-SCNC: 1.11 MMOL/L — LOW (ref 1.15–1.33)
CALCIUM SERPL-MCNC: 9.3 MG/DL — SIGNIFICANT CHANGE UP (ref 8.5–10.1)
CHLORIDE SERPL-SCNC: 99 MMOL/L — SIGNIFICANT CHANGE UP (ref 98–110)
CK SERPL-CCNC: 486 U/L — HIGH (ref 0–225)
CO2 SERPL-SCNC: 21 MMOL/L — SIGNIFICANT CHANGE UP (ref 17–32)
CREAT SERPL-MCNC: 0.8 MG/DL — SIGNIFICANT CHANGE UP (ref 0.7–1.5)
EOSINOPHIL # BLD AUTO: 0.03 K/UL — SIGNIFICANT CHANGE UP (ref 0–0.7)
EOSINOPHIL NFR BLD AUTO: 0.8 % — SIGNIFICANT CHANGE UP (ref 0–8)
GAS PNL BLDV: 137 MMOL/L — SIGNIFICANT CHANGE UP (ref 136–145)
GAS PNL BLDV: SIGNIFICANT CHANGE UP
GLUCOSE SERPL-MCNC: 392 MG/DL — HIGH (ref 70–99)
HCO3 BLDV-SCNC: 29 MMOL/L — SIGNIFICANT CHANGE UP (ref 22–29)
HCT VFR BLD CALC: 42.2 % — SIGNIFICANT CHANGE UP (ref 42–52)
HCT VFR BLDA CALC: 42 % — SIGNIFICANT CHANGE UP (ref 39–51)
HGB BLD CALC-MCNC: 13.9 G/DL — SIGNIFICANT CHANGE UP (ref 12.6–17.4)
HGB BLD-MCNC: 13.9 G/DL — LOW (ref 14–18)
IMM GRANULOCYTES NFR BLD AUTO: 0.3 % — SIGNIFICANT CHANGE UP (ref 0.1–0.3)
LACTATE BLDV-MCNC: 5.9 MMOL/L — CRITICAL HIGH (ref 0.5–2)
LACTATE SERPL-SCNC: 3.9 MMOL/L — HIGH (ref 0.7–2)
LYMPHOCYTES # BLD AUTO: 1.34 K/UL — SIGNIFICANT CHANGE UP (ref 1.2–3.4)
LYMPHOCYTES # BLD AUTO: 34.6 % — SIGNIFICANT CHANGE UP (ref 20.5–51.1)
MCHC RBC-ENTMCNC: 32.9 G/DL — SIGNIFICANT CHANGE UP (ref 32–37)
MCHC RBC-ENTMCNC: 33.5 PG — HIGH (ref 27–31)
MCV RBC AUTO: 101.7 FL — HIGH (ref 80–94)
MONOCYTES # BLD AUTO: 0.65 K/UL — HIGH (ref 0.1–0.6)
MONOCYTES NFR BLD AUTO: 16.8 % — HIGH (ref 1.7–9.3)
NEUTROPHILS # BLD AUTO: 1.82 K/UL — SIGNIFICANT CHANGE UP (ref 1.4–6.5)
NEUTROPHILS NFR BLD AUTO: 47 % — SIGNIFICANT CHANGE UP (ref 42.2–75.2)
NRBC # BLD: 0 /100 WBCS — SIGNIFICANT CHANGE UP (ref 0–0)
PCO2 BLDV: 53 MMHG — SIGNIFICANT CHANGE UP (ref 42–55)
PH BLDV: 7.35 — SIGNIFICANT CHANGE UP (ref 7.32–7.43)
PLATELET # BLD AUTO: 57 K/UL — LOW (ref 130–400)
PO2 BLDV: 44 MMHG — SIGNIFICANT CHANGE UP
POTASSIUM BLDV-SCNC: 4.6 MMOL/L — SIGNIFICANT CHANGE UP (ref 3.5–5.1)
POTASSIUM SERPL-MCNC: 4.6 MMOL/L — SIGNIFICANT CHANGE UP (ref 3.5–5)
POTASSIUM SERPL-SCNC: 4.6 MMOL/L — SIGNIFICANT CHANGE UP (ref 3.5–5)
RBC # BLD: 4.15 M/UL — LOW (ref 4.7–6.1)
RBC # FLD: 13.8 % — SIGNIFICANT CHANGE UP (ref 11.5–14.5)
SAO2 % BLDV: 71.2 % — SIGNIFICANT CHANGE UP
SODIUM SERPL-SCNC: 140 MMOL/L — SIGNIFICANT CHANGE UP (ref 135–146)
WBC # BLD: 3.87 K/UL — LOW (ref 4.8–10.8)
WBC # FLD AUTO: 3.87 K/UL — LOW (ref 4.8–10.8)

## 2022-01-07 PROCEDURE — 99284 EMERGENCY DEPT VISIT MOD MDM: CPT

## 2022-01-07 RX ORDER — ZINC SULFATE TAB 220 MG (50 MG ZINC EQUIVALENT) 220 (50 ZN) MG
0 TAB ORAL
Qty: 0 | Refills: 0 | DISCHARGE

## 2022-01-07 RX ORDER — THIAMINE MONONITRATE (VIT B1) 100 MG
100 TABLET ORAL ONCE
Refills: 0 | Status: COMPLETED | OUTPATIENT
Start: 2022-01-07 | End: 2022-01-07

## 2022-01-07 RX ORDER — SODIUM CHLORIDE 9 MG/ML
1000 INJECTION, SOLUTION INTRAVENOUS ONCE
Refills: 0 | Status: COMPLETED | OUTPATIENT
Start: 2022-01-07 | End: 2022-01-07

## 2022-01-07 RX ADMIN — SODIUM CHLORIDE 1000 MILLILITER(S): 9 INJECTION, SOLUTION INTRAVENOUS at 00:45

## 2022-01-07 RX ADMIN — SODIUM CHLORIDE 1000 MILLILITER(S): 9 INJECTION, SOLUTION INTRAVENOUS at 05:44

## 2022-01-07 RX ADMIN — SODIUM CHLORIDE 1000 MILLILITER(S): 9 INJECTION, SOLUTION INTRAVENOUS at 01:43

## 2022-01-07 RX ADMIN — Medication 100 MILLIGRAM(S): at 00:45

## 2022-01-07 NOTE — ED PROVIDER NOTE - PRINCIPAL DIAGNOSIS
Alcohol intoxication Alcohol use with uncomplicated intoxication with moderate or severe use disorder

## 2022-01-07 NOTE — ED PROVIDER NOTE - CLINICAL SUMMARY MEDICAL DECISION MAKING FREE TEXT BOX
51yM HTN DM alcohol abuse p/w alcohol intoxication - pt monitored until clinically sober.  Exam w/o new traumatic injuries or damage from exposure to cold/wet weather.  Labs w/ marked hyperglycemia w/o DKA, elevated lactate that improved with IV NS and mildly elevated CK as well.  Recommend supportive care, o/p detox resources as tolerated, return precautions.

## 2022-01-07 NOTE — ED PROVIDER NOTE - NS ED ROS FT
Constitutional:  (+) intoxicated.  No fevers or chills.  Eyes:  No visual changes, eye pain, or discharge.  ENT:  No hearing changes. No sore throat.  Neck:  No neck pain or stiffness.  Cardiac:  No CP or edema.  Resp:  No cough or SOB. No hemoptysis.   GI:  No nausea, vomiting, diarrhea, or abdominal pain.  :  No dysuria, frequency, or hematuria.  MSK:  No myalgias or joint pain/swelling.  Neuro:  No headache, dizziness, or weakness.  Skin:  No skin rash.

## 2022-01-07 NOTE — ED PROVIDER NOTE - CARE PLAN
1 Principal Discharge DX:	EtOH dependence  Secondary Diagnosis:	High serum lactate  Secondary Diagnosis:	Elevated CK   Principal Discharge DX:	EtOH dependence  Secondary Diagnosis:	High serum lactate  Secondary Diagnosis:	Elevated CK  Secondary Diagnosis:	Hyperglycemia

## 2022-01-07 NOTE — ED PROVIDER NOTE - OBJECTIVE STATEMENT
51 y male with PMH of ETOH abuse, DM, HTN BIB EMS after being found outside in the cold sitting on the ground intoxicated.  patient admits to ETOH use today.  drank "2 pints of vodka" which is patients baseline. patient AAOx3 on presentation asking for food.  denies trauma.

## 2022-01-07 NOTE — ED PROVIDER NOTE - PHYSICAL EXAMINATION
PHYSICAL EXAM: I have reviewed current vital signs.  GENERAL: NAD, well-nourished; well-developed.  HEAD:  Normocephalic, atraumatic.  EYES: EOMI, PERRL, conjunctiva and sclera clear.  ENT: MMM, no erythema/exudates.  NECK: Supple, no JVD.  CHEST/LUNG: Clear to auscultation bilaterally; no wheezes, rales, or rhonchi.  HEART: Regular rate and rhythm, normal S1 and S2; no murmurs, rubs, or gallops.  ABDOMEN: Soft, nontender, nondistended.  EXTREMITIES:  2+ peripheral pulses; no clubbing, cyanosis, or edema.  PSYCH: Cooperative but intoxicated slurring words.    NEUROLOGY: A&O x 3. Motor 5/5. Sensory intact. No focal neurological deficits. CN II - XII intact. (-) dysmetria, facial droop, pronator drift.  SKIN: Warm and dry.

## 2022-01-07 NOTE — ED PROVIDER NOTE - PHYSICAL EXAMINATION
Physical Exam    Vital Signs: I have reviewed the initial vital signs.  Constitutional: pt is overweight, appears stated age, no acute distress  Eyes: Conjunctiva pink, Sclera clear, PERRLA, EOMI without pain.   ENT: No tongue fasiculations.   Cardiovascular: S1 and S2, regular rate, regular rhythm, well-perfused extremities, radial pulses equal and 2+ b/l.   Respiratory: unlabored respiratory effort, clear to auscultation bilaterally no wheezing, rales and rhonchi. pt is speaking full sentences. no accessory muscle use.   Gastrointestinal: soft, non-tender, nondistended abdomen, no pulsatile mass, normal bowl sounds, no rebound, no guarding, no organomegaly.   Musculoskeletal: supple neck, no lower extremity edema, no calf tenderness, no midline tenderness, no palpable spinal step offs. FROM of b/l upper and lower extremities.   Integumentary: warm, dry, no rash. (+) evolving abrasions to the pt right lateral aspect of the hand. no urticaria.   Neurologic: awake, alert, cranial nerves II-XII grossly intact, extremities’ motor and sensory functions grossly intact. finger to nose intact. negative pronator drift. negative romberg. steady gait.   Psychiatric: appropriate mood, appropriate affect Physical Exam    Vital Signs: I have reviewed the initial vital signs.  Constitutional: pt is overweight, appears stated age, no acute distress  Eyes: Conjunctiva pink, Sclera clear, PERRLA, EOMI without pain.   ENT: No tongue fasiculations.   Cardiovascular: S1 and S2, regular rate, regular rhythm, well-perfused extremities, radial pulses equal and 2+ b/l.   Respiratory: unlabored respiratory effort, clear to auscultation bilaterally no wheezing, rales and rhonchi. pt is speaking full sentences. no accessory muscle use.   Gastrointestinal: soft, non-tender, nondistended abdomen, no pulsatile mass, normal bowl sounds, no rebound, no guarding, no organomegaly.   Musculoskeletal: supple neck, no lower extremity edema, no calf tenderness, no midline tenderness, no palpable spinal step offs. FROM of b/l upper and lower extremities.   Integumentary: warm, dry, no rash. (+) evolving abrasions to the pt right lateral aspect of the hand. no urticaria.   Neurologic: skull is atrauamtic normocephalic. awake, alert, cranial nerves II-XII grossly intact, extremities’ motor and sensory functions grossly intact. finger to nose intact. negative pronator drift. negative romberg. steady gait.   Psychiatric: appropriate mood, appropriate affect

## 2022-01-07 NOTE — ED PROVIDER NOTE - NS ED ROS FT
63
CONST: No fever, chills or bodyaches  EYES: No pain, redness, drainage or visual changes.  ENT: No ear pain or discharge, nasal discharge or congestion. No sore throat  CARD: No chest pain, palpitations  RESP: No SOB, cough, hemoptysis. No hx of asthma or COPD  GI: No abdominal pain, N/V/D  : No urinary symptoms  MS: No joint pain, back pain or extremity pain/injury  SKIN: No rashes  NEURO: No headache, dizziness, paresthesias or LOC

## 2022-01-07 NOTE — ED PROVIDER NOTE - OBJECTIVE STATEMENT
52 y/o male with PMH of DM, HTN, polysubstance abuse on suboxone, and EtOH abuse presents to the ED brought in by pt brother and sister for evaluation of alcohol intoxication. pt family reports he was across the stret at the detox center and the catch team sent him over because they were closing for the day. pt reports he drinks beer and vodka daily. pt reports he was out drink today starting around 11AM-4:30PM before he came to the ED. pt denies illicit drug use, visual or auditory hallucinations, suicidal or homicidal ideations, head injury, dizziness, chest pain, sob, abdominal pain, or n/v/d/c.

## 2022-01-07 NOTE — ED PROVIDER NOTE - NSFOLLOWUPCLINICS_GEN_ALL_ED_FT
[facility]ox [facility]mt [facility]  Detox Mgmt  [address] [place] [address]  Phone: [phone]  Fax:   Follow Up Time: 1-3 Days

## 2022-01-07 NOTE — ED PROVIDER NOTE - PATIENT PORTAL LINK FT
You can access the FollowMyHealth Patient Portal offered by [facility] by registering at the following website: [email or web address]. By joining [employer]’s FollowMyHealth portal, you will also be able to view your health information using other applications (apps) compatible with our system.

## 2022-01-07 NOTE — ED PROVIDER NOTE - CARE PLAN
Principal Discharge DX:	Alcohol intoxication   1 Principal Discharge DX:	Alcohol use with uncomplicated intoxication with moderate or severe use disorder

## 2022-01-07 NOTE — ED PROVIDER NOTE - ATTENDING CONTRIBUTION TO CARE
51yM BIBEMS for intoxication and ?cold exposure - hx provided by pt and EMS - pt living with his sister, who kicked him out of the house for alcohol intoxication and insisted he go to [facility] for detox admission, even once told by EMS that the detox unit was not open/accepting admissions.  Pt was waiting outside when EMS arrived and admits to alcohol use today.  +bruising to his face, but denies any new falls since last evaluation.

## 2022-01-07 NOTE — ED PROVIDER NOTE - PATIENT PORTAL LINK FT
You can access the FollowMyHealth Patient Portal offered by [facility] by registering at the following website: [email or web address]. By joining [employer]’s FollowMyHealth portal, you will also be able to view your health information using other applications (apps) compatible with our system.

## 2022-01-07 NOTE — ED PROVIDER NOTE - NSFOLLOWUPCLINICS_GEN_ALL_ED_FT
[facility]  Medicine  [address], [place]   Phone: [phone]  Fax:   Follow Up Time: 1-3 Days

## 2022-01-07 NOTE — ED PROVIDER NOTE - ATTENDING CONTRIBUTION TO CARE
Pt dropped off by family for alcohol use disorder.  Pt last drank 2h ago.  Pt denies any acute medical complaints.  No signs of withdrawal.    Exam: normal gait, RRR, CTAB, soft NT abdomen, NAD  Plan: refer to [facility] Detox [facility]

## 2022-01-07 NOTE — ED PROVIDER NOTE - NSFOLLOWUPINSTRUCTIONS_ED_ALL_ED_FT
Alcohol Intoxication  Alcohol intoxication occurs when a person no longer thinks clearly or functions well (becomes impaired) after drinking alcohol. Intoxication can occur with even one drink. The level of impairment depends on:    The amount of alcohol the person had.  The person's age, gender, and weight.  How often the person drinks.  Whether the person has other medical conditions, such as diabetes, seizures, or a heart condition.    Alcohol intoxication can range in severity from mild to severe. The condition can be dangerous, especially when caused by drinking large amounts of alcohol in a short period of time (binge drinking) or if the person also took certain prescription medicines or recreational drugs.    What are the signs or symptoms?  Symptoms of mild alcohol intoxication include:    Feeling relaxed or sleepy.  Mild difficulty with:    Coordination.  Speech.  Memory.  Attention.      Symptoms of moderate alcohol intoxication include:    Extreme emotions, such as anger or sadness.  Moderate difficulty with:    Coordination.  Speech.  Memory.  Attention.      Symptoms of severe alcohol intoxication include:    Passing out.  Vomiting.  Confusion.  Slow breathing.  Coma.  Severe difficulty with:    Coordination.  Speech.  Memory.  Attention.      Intoxication, especially in people who are not exposed to alcohol often can progress from mild to severe quickly, and may even cause coma or death.    How is this diagnosed?  This condition may be diagnosed based on:    A medical history.  A physical exam.  A blood test that measures the concentration of alcohol in the blood (blood alcohol content, or LAURI).  Whether there is a smell of alcohol on the breath.    Your health care provider will ask you how much alcohol you drank and what kind of alcohol you had.    How is this treated?  Usually, treatment is not needed for this condition. Most of the effects of alcohol are temporary and go away as the alcohol naturally leaves the body. Your health care provider may recommend monitoring until the alcohol level starts to drop and it is safe to go home. You may also get fluids through an IV tube to help prevent dehydration. If the intoxication is severe, a breathing machine called a ventilator may be needed to support your breathing.    Follow these instructions at home:  Do not drive after drinking alcohol.  Have someone stay with you while you are intoxicated. You should not be left alone.  Stay hydrated. Drink enough fluid to keep your urine clear or pale yellow.  Avoid caffeine because it can dehydrate you.  ImageTake over-the-counter and prescription medicines only as told by your health care provider.  How is this prevented?  To prevent alcohol intoxication:    Limit alcohol intake to no more than 1 drink a day for nonpregnant women and 2 drinks a day for men. One drink equals 12 oz of beer, 5 oz of wine, or 1½ oz of hard liquor.  Do not drink alcohol on an empty stomach.  Avoid drinking alcohol if:    You are under the legal drinking age.  You are pregnant or may be pregnant.  You are taking medicines that should not be taken with alcohol.  Your drinking causes your medical condition to get worse.  You need to drive or perform activities that require your attention.  You have substance use disorder.      To prevent potentially serious complications of alcohol intoxication, seek immediate medical care if you or someone you know has signs of moderate or severe alcohol intoxication. These include:    Moderate or severe difficulty with:    Coordination.  Speech.  Memory.  Attention.    Passing out.  Confusion.  Vomiting.    Do not leave someone alone if he or she is intoxicated.    Contact a health care provider if:  You do not feel better after a few days.  You are having problems at work, at school, or at home due to drinking.  Get help right away if:  You become shaky when you try to stop drinking.  You shake uncontrollably (have a seizure).  You vomit blood. Blood in vomit may look bright red, or it may look like coffee grounds.  You have blood in your stool. Blood in stool may be bright red, or it may make stool appear black and tarry and make it smell bad.  You become light-headed or you faint.  If you ever feel like you may hurt yourself or others, or have thoughts about taking your own life, get help right away. You can go to your nearest emergency department or call:     Your local emergency services (911 in the [place]   A suicide crisis helpline, such as the National Suicide Prevention Lifeline at 1-902.848.6879. This is open 24 hours a day.     This information is not intended to replace advice given to you by your health care provider. Make sure you discuss any questions you have with your health care provider.

## 2022-01-08 ENCOUNTER — INPATIENT (INPATIENT)
Facility: HOSPITAL | Age: 52
LOS: 3 days | Discharge: HOME | End: 2022-01-12
Attending: SURGERY | Admitting: SURGERY
Payer: MEDICAID

## 2022-01-08 VITALS
HEIGHT: 73 IN | DIASTOLIC BLOOD PRESSURE: 70 MMHG | SYSTOLIC BLOOD PRESSURE: 139 MMHG | RESPIRATION RATE: 20 BRPM | OXYGEN SATURATION: 96 % | HEART RATE: 80 BPM | TEMPERATURE: 96 F

## 2022-01-08 DIAGNOSIS — Z98.890 OTHER SPECIFIED POSTPROCEDURAL STATES: Chronic | ICD-10-CM

## 2022-01-08 LAB
ALBUMIN SERPL ELPH-MCNC: 3.9 G/DL — SIGNIFICANT CHANGE UP (ref 3.5–5.2)
ALP SERPL-CCNC: 106 U/L — SIGNIFICANT CHANGE UP (ref 30–115)
ALT FLD-CCNC: 47 U/L — HIGH (ref 0–41)
ANION GAP SERPL CALC-SCNC: 19 MMOL/L — HIGH (ref 7–14)
ANION GAP SERPL CALC-SCNC: 20 MMOL/L — HIGH (ref 7–14)
APTT BLD: 41.1 SEC — HIGH (ref 27–39.2)
AST SERPL-CCNC: 129 U/L — HIGH (ref 0–41)
BASE EXCESS BLDA CALC-SCNC: 4.5 MMOL/L — HIGH (ref -2–3)
BASOPHILS # BLD AUTO: 0.03 K/UL — SIGNIFICANT CHANGE UP (ref 0–0.2)
BASOPHILS NFR BLD AUTO: 0.4 % — SIGNIFICANT CHANGE UP (ref 0–1)
BILIRUB SERPL-MCNC: 2.3 MG/DL — HIGH (ref 0.2–1.2)
BUN SERPL-MCNC: 12 MG/DL — SIGNIFICANT CHANGE UP (ref 10–20)
BUN SERPL-MCNC: 12 MG/DL — SIGNIFICANT CHANGE UP (ref 10–20)
CALCIUM SERPL-MCNC: 8.2 MG/DL — LOW (ref 8.5–10.1)
CALCIUM SERPL-MCNC: 8.9 MG/DL — SIGNIFICANT CHANGE UP (ref 8.5–10.1)
CHLORIDE SERPL-SCNC: 95 MMOL/L — LOW (ref 98–110)
CHLORIDE SERPL-SCNC: 98 MMOL/L — SIGNIFICANT CHANGE UP (ref 98–110)
CK SERPL-CCNC: 466 U/L — HIGH (ref 0–225)
CK SERPL-CCNC: 757 U/L — HIGH (ref 0–225)
CO2 SERPL-SCNC: 20 MMOL/L — SIGNIFICANT CHANGE UP (ref 17–32)
CO2 SERPL-SCNC: 21 MMOL/L — SIGNIFICANT CHANGE UP (ref 17–32)
CREAT SERPL-MCNC: 0.6 MG/DL — LOW (ref 0.7–1.5)
CREAT SERPL-MCNC: 0.7 MG/DL — SIGNIFICANT CHANGE UP (ref 0.7–1.5)
EOSINOPHIL # BLD AUTO: 0 K/UL — SIGNIFICANT CHANGE UP (ref 0–0.7)
EOSINOPHIL NFR BLD AUTO: 0 % — SIGNIFICANT CHANGE UP (ref 0–8)
ETHANOL SERPL-MCNC: 380 MG/DL — HIGH
GAS PNL BLDA: SIGNIFICANT CHANGE UP
GLUCOSE BLDC GLUCOMTR-MCNC: 201 MG/DL — HIGH (ref 70–99)
GLUCOSE BLDC GLUCOMTR-MCNC: 213 MG/DL — HIGH (ref 70–99)
GLUCOSE BLDC GLUCOMTR-MCNC: 321 MG/DL — HIGH (ref 70–99)
GLUCOSE SERPL-MCNC: 287 MG/DL — HIGH (ref 70–99)
GLUCOSE SERPL-MCNC: 322 MG/DL — HIGH (ref 70–99)
HCO3 BLDA-SCNC: 28 MMOL/L — SIGNIFICANT CHANGE UP (ref 21–28)
HCT VFR BLD CALC: 39.7 % — LOW (ref 42–52)
HGB BLD-MCNC: 13.2 G/DL — LOW (ref 14–18)
HOROWITZ INDEX BLDA+IHG-RTO: 21 — SIGNIFICANT CHANGE UP
IMM GRANULOCYTES NFR BLD AUTO: 0.3 % — SIGNIFICANT CHANGE UP (ref 0.1–0.3)
INR BLD: 1.33 RATIO — HIGH (ref 0.65–1.3)
LACTATE SERPL-SCNC: 5.6 MMOL/L — CRITICAL HIGH (ref 0.7–2)
LIDOCAIN IGE QN: 29 U/L — SIGNIFICANT CHANGE UP (ref 7–60)
LYMPHOCYTES # BLD AUTO: 1.7 K/UL — SIGNIFICANT CHANGE UP (ref 1.2–3.4)
LYMPHOCYTES # BLD AUTO: 23.1 % — SIGNIFICANT CHANGE UP (ref 20.5–51.1)
MAGNESIUM SERPL-MCNC: 1.2 MG/DL — LOW (ref 1.8–2.4)
MCHC RBC-ENTMCNC: 32.9 PG — HIGH (ref 27–31)
MCHC RBC-ENTMCNC: 33.2 G/DL — SIGNIFICANT CHANGE UP (ref 32–37)
MCV RBC AUTO: 99 FL — HIGH (ref 80–94)
MONOCYTES # BLD AUTO: 0.84 K/UL — HIGH (ref 0.1–0.6)
MONOCYTES NFR BLD AUTO: 11.4 % — HIGH (ref 1.7–9.3)
NEUTROPHILS # BLD AUTO: 4.78 K/UL — SIGNIFICANT CHANGE UP (ref 1.4–6.5)
NEUTROPHILS NFR BLD AUTO: 64.8 % — SIGNIFICANT CHANGE UP (ref 42.2–75.2)
NRBC # BLD: 0 /100 WBCS — SIGNIFICANT CHANGE UP (ref 0–0)
PCO2 BLDA: 39 MMHG — SIGNIFICANT CHANGE UP (ref 35–48)
PH BLDA: 7.47 — HIGH (ref 7.35–7.45)
PHOSPHATE SERPL-MCNC: 3.8 MG/DL — SIGNIFICANT CHANGE UP (ref 2.1–4.9)
PLATELET # BLD AUTO: 82 K/UL — LOW (ref 130–400)
PO2 BLDA: 74 MMHG — LOW (ref 83–108)
POTASSIUM SERPL-MCNC: 4.4 MMOL/L — SIGNIFICANT CHANGE UP (ref 3.5–5)
POTASSIUM SERPL-MCNC: 4.9 MMOL/L — SIGNIFICANT CHANGE UP (ref 3.5–5)
POTASSIUM SERPL-SCNC: 4.4 MMOL/L — SIGNIFICANT CHANGE UP (ref 3.5–5)
POTASSIUM SERPL-SCNC: 4.9 MMOL/L — SIGNIFICANT CHANGE UP (ref 3.5–5)
PROT SERPL-MCNC: 7.1 G/DL — SIGNIFICANT CHANGE UP (ref 6–8)
PROTHROM AB SERPL-ACNC: 15.2 SEC — HIGH (ref 9.95–12.87)
RBC # BLD: 4.01 M/UL — LOW (ref 4.7–6.1)
RBC # FLD: 14 % — SIGNIFICANT CHANGE UP (ref 11.5–14.5)
SAO2 % BLDA: 97.2 % — SIGNIFICANT CHANGE UP (ref 94–98)
SARS-COV-2 RNA SPEC QL NAA+PROBE: SIGNIFICANT CHANGE UP
SODIUM SERPL-SCNC: 136 MMOL/L — SIGNIFICANT CHANGE UP (ref 135–146)
SODIUM SERPL-SCNC: 137 MMOL/L — SIGNIFICANT CHANGE UP (ref 135–146)
WBC # BLD: 7.37 K/UL — SIGNIFICANT CHANGE UP (ref 4.8–10.8)
WBC # FLD AUTO: 7.37 K/UL — SIGNIFICANT CHANGE UP (ref 4.8–10.8)

## 2022-01-08 PROCEDURE — 73060 X-RAY EXAM OF HUMERUS: CPT | Mod: 26

## 2022-01-08 PROCEDURE — 71260 CT THORAX DX C+: CPT | Mod: 26,MA

## 2022-01-08 PROCEDURE — 99285 EMERGENCY DEPT VISIT HI MDM: CPT

## 2022-01-08 PROCEDURE — 99221 1ST HOSP IP/OBS SF/LOW 40: CPT

## 2022-01-08 PROCEDURE — 93010 ELECTROCARDIOGRAM REPORT: CPT

## 2022-01-08 PROCEDURE — 72170 X-RAY EXAM OF PELVIS: CPT | Mod: 26

## 2022-01-08 PROCEDURE — 74177 CT ABD & PELVIS W/CONTRAST: CPT | Mod: 26,MA

## 2022-01-08 PROCEDURE — 72125 CT NECK SPINE W/O DYE: CPT | Mod: 26,MA

## 2022-01-08 PROCEDURE — 99223 1ST HOSP IP/OBS HIGH 75: CPT

## 2022-01-08 PROCEDURE — 73560 X-RAY EXAM OF KNEE 1 OR 2: CPT | Mod: 26,LT,RT

## 2022-01-08 PROCEDURE — 71045 X-RAY EXAM CHEST 1 VIEW: CPT | Mod: 26

## 2022-01-08 PROCEDURE — 70450 CT HEAD/BRAIN W/O DYE: CPT | Mod: 26,MA

## 2022-01-08 RX ORDER — ACETAMINOPHEN 500 MG
650 TABLET ORAL EVERY 6 HOURS
Refills: 0 | Status: DISCONTINUED | OUTPATIENT
Start: 2022-01-08 | End: 2022-01-09

## 2022-01-08 RX ORDER — MAGNESIUM SULFATE 500 MG/ML
2 VIAL (ML) INJECTION
Refills: 0 | Status: COMPLETED | OUTPATIENT
Start: 2022-01-08 | End: 2022-01-08

## 2022-01-08 RX ORDER — ENOXAPARIN SODIUM 100 MG/ML
40 INJECTION SUBCUTANEOUS DAILY
Refills: 0 | Status: DISCONTINUED | OUTPATIENT
Start: 2022-01-08 | End: 2022-01-12

## 2022-01-08 RX ORDER — INSULIN HUMAN 100 [IU]/ML
INJECTION, SOLUTION SUBCUTANEOUS
Refills: 0 | Status: DISCONTINUED | OUTPATIENT
Start: 2022-01-08 | End: 2022-01-10

## 2022-01-08 RX ORDER — DEXTROSE 50 % IN WATER 50 %
15 SYRINGE (ML) INTRAVENOUS ONCE
Refills: 0 | Status: DISCONTINUED | OUTPATIENT
Start: 2022-01-08 | End: 2022-01-09

## 2022-01-08 RX ORDER — PANTOPRAZOLE SODIUM 20 MG/1
40 TABLET, DELAYED RELEASE ORAL
Refills: 0 | Status: DISCONTINUED | OUTPATIENT
Start: 2022-01-08 | End: 2022-01-12

## 2022-01-08 RX ORDER — SODIUM CHLORIDE 9 MG/ML
1000 INJECTION, SOLUTION INTRAVENOUS
Refills: 0 | Status: DISCONTINUED | OUTPATIENT
Start: 2022-01-08 | End: 2022-01-09

## 2022-01-08 RX ORDER — INSULIN LISPRO 100/ML
VIAL (ML) SUBCUTANEOUS
Refills: 0 | Status: DISCONTINUED | OUTPATIENT
Start: 2022-01-08 | End: 2022-01-08

## 2022-01-08 RX ORDER — FOLIC ACID 0.8 MG
1 TABLET ORAL DAILY
Refills: 0 | Status: DISCONTINUED | OUTPATIENT
Start: 2022-01-08 | End: 2022-01-12

## 2022-01-08 RX ORDER — BUPRENORPHINE AND NALOXONE 2; .5 MG/1; MG/1
1 TABLET SUBLINGUAL DAILY
Refills: 0 | Status: DISCONTINUED | OUTPATIENT
Start: 2022-01-08 | End: 2022-01-12

## 2022-01-08 RX ORDER — IBUPROFEN 200 MG
600 TABLET ORAL EVERY 6 HOURS
Refills: 0 | Status: DISCONTINUED | OUTPATIENT
Start: 2022-01-08 | End: 2022-01-08

## 2022-01-08 RX ORDER — IBUPROFEN 200 MG
600 TABLET ORAL EVERY 8 HOURS
Refills: 0 | Status: COMPLETED | OUTPATIENT
Start: 2022-01-08 | End: 2022-01-11

## 2022-01-08 RX ORDER — GABAPENTIN 400 MG/1
300 CAPSULE ORAL EVERY 8 HOURS
Refills: 0 | Status: DISCONTINUED | OUTPATIENT
Start: 2022-01-08 | End: 2022-01-09

## 2022-01-08 RX ORDER — GLUCAGON INJECTION, SOLUTION 0.5 MG/.1ML
1 INJECTION, SOLUTION SUBCUTANEOUS ONCE
Refills: 0 | Status: DISCONTINUED | OUTPATIENT
Start: 2022-01-08 | End: 2022-01-10

## 2022-01-08 RX ORDER — GABAPENTIN 400 MG/1
100 CAPSULE ORAL THREE TIMES A DAY
Refills: 0 | Status: DISCONTINUED | OUTPATIENT
Start: 2022-01-08 | End: 2022-01-08

## 2022-01-08 RX ORDER — LIDOCAINE 4 G/100G
1 CREAM TOPICAL EVERY 24 HOURS
Refills: 0 | Status: DISCONTINUED | OUTPATIENT
Start: 2022-01-08 | End: 2022-01-12

## 2022-01-08 RX ORDER — THIAMINE MONONITRATE (VIT B1) 100 MG
100 TABLET ORAL DAILY
Refills: 0 | Status: DISCONTINUED | OUTPATIENT
Start: 2022-01-08 | End: 2022-01-12

## 2022-01-08 RX ORDER — DEXTROSE 50 % IN WATER 50 %
25 SYRINGE (ML) INTRAVENOUS ONCE
Refills: 0 | Status: DISCONTINUED | OUTPATIENT
Start: 2022-01-08 | End: 2022-01-09

## 2022-01-08 RX ORDER — SENNA PLUS 8.6 MG/1
2 TABLET ORAL AT BEDTIME
Refills: 0 | Status: DISCONTINUED | OUTPATIENT
Start: 2022-01-08 | End: 2022-01-12

## 2022-01-08 RX ORDER — SODIUM CHLORIDE 9 MG/ML
1000 INJECTION INTRAMUSCULAR; INTRAVENOUS; SUBCUTANEOUS ONCE
Refills: 0 | Status: COMPLETED | OUTPATIENT
Start: 2022-01-08 | End: 2022-01-08

## 2022-01-08 RX ORDER — ACETAMINOPHEN 500 MG
650 TABLET ORAL EVERY 6 HOURS
Refills: 0 | Status: DISCONTINUED | OUTPATIENT
Start: 2022-01-08 | End: 2022-01-08

## 2022-01-08 RX ORDER — MORPHINE SULFATE 50 MG/1
2 CAPSULE, EXTENDED RELEASE ORAL ONCE
Refills: 0 | Status: DISCONTINUED | OUTPATIENT
Start: 2022-01-08 | End: 2022-01-08

## 2022-01-08 RX ORDER — INFLUENZA VIRUS VACCINE 15; 15; 15; 15 UG/.5ML; UG/.5ML; UG/.5ML; UG/.5ML
0.5 SUSPENSION INTRAMUSCULAR ONCE
Refills: 0 | Status: DISCONTINUED | OUTPATIENT
Start: 2022-01-08 | End: 2022-01-12

## 2022-01-08 RX ORDER — SODIUM CHLORIDE 9 MG/ML
1000 INJECTION INTRAMUSCULAR; INTRAVENOUS; SUBCUTANEOUS
Refills: 0 | Status: DISCONTINUED | OUTPATIENT
Start: 2022-01-08 | End: 2022-01-09

## 2022-01-08 RX ADMIN — SENNA PLUS 2 TABLET(S): 8.6 TABLET ORAL at 22:47

## 2022-01-08 RX ADMIN — INSULIN HUMAN 7: 100 INJECTION, SOLUTION SUBCUTANEOUS at 16:59

## 2022-01-08 RX ADMIN — BUPRENORPHINE AND NALOXONE 1 TABLET(S): 2; .5 TABLET SUBLINGUAL at 23:30

## 2022-01-08 RX ADMIN — Medication 600 MILLIGRAM(S): at 22:47

## 2022-01-08 RX ADMIN — LIDOCAINE 1 PATCH: 4 CREAM TOPICAL at 17:00

## 2022-01-08 RX ADMIN — ENOXAPARIN SODIUM 40 MILLIGRAM(S): 100 INJECTION SUBCUTANEOUS at 17:00

## 2022-01-08 RX ADMIN — SODIUM CHLORIDE 1000 MILLILITER(S): 9 INJECTION INTRAMUSCULAR; INTRAVENOUS; SUBCUTANEOUS at 09:50

## 2022-01-08 RX ADMIN — INSULIN HUMAN 13: 100 INJECTION, SOLUTION SUBCUTANEOUS at 22:48

## 2022-01-08 RX ADMIN — MORPHINE SULFATE 2 MILLIGRAM(S): 50 CAPSULE, EXTENDED RELEASE ORAL at 06:35

## 2022-01-08 RX ADMIN — Medication 650 MILLIGRAM(S): at 18:53

## 2022-01-08 RX ADMIN — SODIUM CHLORIDE 100 MILLILITER(S): 9 INJECTION, SOLUTION INTRAVENOUS at 18:53

## 2022-01-08 RX ADMIN — Medication 650 MILLIGRAM(S): at 23:30

## 2022-01-08 RX ADMIN — GABAPENTIN 300 MILLIGRAM(S): 400 CAPSULE ORAL at 22:47

## 2022-01-08 RX ADMIN — Medication 25 GRAM(S): at 20:53

## 2022-01-08 RX ADMIN — Medication 100 MILLIGRAM(S): at 18:53

## 2022-01-08 RX ADMIN — Medication 25 GRAM(S): at 22:47

## 2022-01-08 NOTE — ED ADULT NURSE REASSESSMENT NOTE - NS ED NURSE REASSESS COMMENT FT1
report given to ED Charge MIKE Hayden and Phi Rico. ED to ED rib fx trauma workup. pt stable at this time

## 2022-01-08 NOTE — ED PROVIDER NOTE - WET READ LAUNCH FT
There are no Wet Read(s) to document. There are 4 Wet Read(s) to document. There are 2 Wet Read(s) to document.

## 2022-01-08 NOTE — ED PROVIDER NOTE - OBJECTIVE STATEMENT
51 y male with PMH of ETOh abuse, DM, presents intoxicated with Etoh on breath stating his sister kicked him out of the house.  patient was seen yesterday for intox and has new bruises on abdomen right chest b/l arms and b/l knee abrasions.  patient complaining of left chest wall pain worse with inspiration.

## 2022-01-08 NOTE — CONSULT NOTE ADULT - SUBJECTIVE AND OBJECTIVE BOX
SICU Consultation Note  =====================================================  HPI:   50yo male with PMH DM-II, neuropathy, HTN, and substance abuse/ETOH abuse (on Suboxone, uses tobacco), drinks 2 pints of vodka per day, recent ER visit on 1/2/22 for "found down" with no traumatic injuries, also admission in 8/2021 for b/l LE cellulitis --> tx with IV Abx, where he was treated for alcohol withdrawal at the time and completed course of ativan taper.  Pt now presents to the ER, s/p mechanical fall, reports taking Ambien last night and drinking vodka, ETOH level 380 on admit.  Pt was found to have acute Left sided 6-9th rib fxs, and T8 vertebral body fx.  SICU/SDU called for close respiratory, hemodynamic monitoring, pain control, and CIWA protocol for possible DTs.       PAST MEDICAL & SURGICAL HISTORY:  HTN (hypertension)    DM (diabetes mellitus)    Alcohol dependency    Diabetes    History of incision and drainage        Allergies  No Known Allergies      SH: ETOH> 2 pints of vodka per day  -h/o opioid abuse    Home Medications:  buprenorphine-naloxone 8 mg-2 mg sublingual tablet:  sublingual  (07 Jan 2022 18:02)  lisinopril:  (07 Jan 2022 18:02)  Metformin 500mg bid  Gabapentin 300mg q8    Advanced Directives: Full Code     ROS:  [x ] A ten-point review of systems was otherwise negative except as noted.  [ ] Due to altered mental status/intubation, subjective information were not able to be obtained from the patient. History was obtained, to the extent possible, from review of the chart and collateral sources of information.      CURRENT MEDICATIONS:   --------------------------------------------------------------------------------------  Neurologic Medications  acetaminophen     Tablet .. 650 milliGRAM(s) Oral every 6 hours  ibuprofen  Tablet. 600 milliGRAM(s) Oral every 6 hours    Respiratory Medications    Cardiovascular Medications    Gastrointestinal Medications  dextrose 5%. 1000 milliLiter(s) IV Continuous <Continuous>  sodium chloride 0.9% 1000 milliLiter(s) IV Continuous   sodium chloride 0.9%. 1000 milliLiter(s) IV Continuous <Continuous>  thiamine 100 milliGRAM(s) Oral daily    Genitourinary Medications    Hematologic/Oncologic Medications    Antimicrobial/Immunologic Medications    Endocrine/Metabolic Medications  dextrose 40% Gel 15 Gram(s) Oral once  dextrose 50% Injectable 25 Gram(s) IV Push once  glucagon  Injectable 1 milliGRAM(s) IntraMuscular once  insulin lispro (ADMELOG) corrective regimen sliding scale   SubCutaneous three times a day before meals    Topical/Other Medications  lidocaine   4% Patch 1 Patch Transdermal every 24 hours    --------------------------------------------------------------------------------------    Vital Signs Last 24 Hrs  T(C): 36.1 (08 Jan 2022 10:29), Max: 36.1 (08 Jan 2022 07:15)  T(F): 96.9 (08 Jan 2022 10:29), Max: 97 (08 Jan 2022 07:15)  HR: 80 (08 Jan 2022 10:29) (70 - 85)  BP: 137/71 (08 Jan 2022 10:29) (100/68 - 143/78)  BP(mean): --  RR: 18 (08 Jan 2022 10:29) (18 - 20)  SpO2: 96% (08 Jan 2022 10:29) (95% - 96%)  I&O's Detail    08 Jan 2022 07:01  -  08 Jan 2022 13:21  --------------------------------------------------------  IN:    Sodium Chloride 0.9% Bolus: 1000 mL  Total IN: 1000 mL    OUT:  Total OUT: 0 mL    Total NET: 1000 mL      I&O's Summary    08 Jan 2022 07:01  -  08 Jan 2022 13:21  --------------------------------------------------------  IN: 1000 mL / OUT: 0 mL / NET: 1000 mL        LABS:                        13.2   7.37  )-----------( 82       ( 08 Jan 2022 07:00 )             39.7     01-08    136  |  95<L>  |  12  ----------------------------<  322<H>  4.9   |  21  |  0.7    Ca    8.9      08 Jan 2022 07:00    TPro  7.1  /  Alb  3.9  /  TBili  2.3<H>  /  DBili  x   /  AST  129<H>  /  ALT  47<H>  /  AlkPhos  106  01-08    LIVER FUNCTIONS - ( 08 Jan 2022 07:00 )  Alb: 3.9 g/dL / Pro: 7.1 g/dL / ALK PHOS: 106 U/L / ALT: 47 U/L / AST: 129 U/L / GGT: x           PT/INR - ( 08 Jan 2022 07:00 )   PT: 15.20 sec;   INR: 1.33 ratio    PTT - ( 08 Jan 2022 07:00 )  PTT:41.1 sec    CARDIAC MARKERS ( 08 Jan 2022 07:00 )  x     / x     / 757 U/L / x     / x      CARDIAC MARKERS ( 07 Jan 2022 01:45 )  x     / x     / 486 U/L / x     / x            EXAM:  General/Neuro  GCS: 15  Exam: Normal, NAD, alert, oriented x 3, no focal deficits. PERRLA, EOMI    Respiratory  Exam: Lungs clear to auscultation, Normal expansion/effort.   -tenderness over left sided chest wall area    Cardiovascular  Exam: S1, S2.  Regular rate and rhythm.   Cardiac Rhythm: Normal Sinus Rhythm    GI  Exam: Abdomen soft, Non-tender, Non-distended.      Extremities  Exam: Extremities warm, well-perfused.  Trace peripheral edema b/l LE    Derm:  Exam: erythema b/l LE      :   Exam: No Oliver catheter      Tubes/Lines/Drains    [x] Peripheral IV           SICU Consultation Note  =====================================================  HPI:   50yo male with PMH DM-II, neuropathy, HTN, and substance abuse/ETOH abuse (on Suboxone, uses tobacco), drinks 2 pints of vodka per day, recent ER visit on 1/2/22 for "found down" with no traumatic injuries, also admission in 8/2021 for b/l LE cellulitis --> tx with IV Abx, where he was treated for alcohol withdrawal at the time and completed course of ativan taper.  Pt now presents to the ER, s/p mechanical fall, reports taking Ambien last night and drinking vodka, ETOH level 380 on admit.  Pt was found to have acute Left sided 6-9th rib fxs, and T8 vertebral body fx.  SICU/SDU called for close respiratory, hemodynamic monitoring, pain control, and CIWA protocol for possible DTs.       PAST MEDICAL & SURGICAL HISTORY:  HTN (hypertension)    DM (diabetes mellitus)    Alcohol dependency    History of incision and drainage left elbow    inguinal hernia repair      Allergies  No Known Allergies      SH: ETOH> 2 pints of vodka per day  -h/o opioid abuse    Home Medications:  buprenorphine-naloxone 8 mg-2 mg sublingual tablet:  sublingual  (07 Jan 2022 18:02)  lisinopril:  (07 Jan 2022 18:02)  Metformin 500mg bid  Gabapentin 300mg q8    Advanced Directives: Full Code     ROS:  [x ] A ten-point review of systems was otherwise negative except as noted.  [ ] Due to altered mental status/intubation, subjective information were not able to be obtained from the patient. History was obtained, to the extent possible, from review of the chart and collateral sources of information.      CURRENT MEDICATIONS:   --------------------------------------------------------------------------------------  Neurologic Medications  acetaminophen     Tablet .. 650 milliGRAM(s) Oral every 6 hours  ibuprofen  Tablet. 600 milliGRAM(s) Oral every 6 hours    Respiratory Medications    Cardiovascular Medications    Gastrointestinal Medications  dextrose 5%. 1000 milliLiter(s) IV Continuous <Continuous>  sodium chloride 0.9% 1000 milliLiter(s) IV Continuous   sodium chloride 0.9%. 1000 milliLiter(s) IV Continuous <Continuous>  thiamine 100 milliGRAM(s) Oral daily    Genitourinary Medications    Hematologic/Oncologic Medications    Antimicrobial/Immunologic Medications    Endocrine/Metabolic Medications  dextrose 40% Gel 15 Gram(s) Oral once  dextrose 50% Injectable 25 Gram(s) IV Push once  glucagon  Injectable 1 milliGRAM(s) IntraMuscular once  insulin lispro (ADMELOG) corrective regimen sliding scale   SubCutaneous three times a day before meals    Topical/Other Medications  lidocaine   4% Patch 1 Patch Transdermal every 24 hours    --------------------------------------------------------------------------------------    Vital Signs Last 24 Hrs  T(C): 36.1 (08 Jan 2022 10:29), Max: 36.1 (08 Jan 2022 07:15)  T(F): 96.9 (08 Jan 2022 10:29), Max: 97 (08 Jan 2022 07:15)  HR: 80 (08 Jan 2022 10:29) (70 - 85)  BP: 137/71 (08 Jan 2022 10:29) (100/68 - 143/78)  BP(mean): --  RR: 18 (08 Jan 2022 10:29) (18 - 20)  SpO2: 96% (08 Jan 2022 10:29) (95% - 96%)  I&O's Detail    08 Jan 2022 07:01  -  08 Jan 2022 13:21  --------------------------------------------------------  IN:    Sodium Chloride 0.9% Bolus: 1000 mL  Total IN: 1000 mL    OUT:  Total OUT: 0 mL    Total NET: 1000 mL      I&O's Summary    08 Jan 2022 07:01  -  08 Jan 2022 13:21  --------------------------------------------------------  IN: 1000 mL / OUT: 0 mL / NET: 1000 mL        LABS:                        13.2   7.37  )-----------( 82       ( 08 Jan 2022 07:00 )             39.7     01-08    136  |  95<L>  |  12  ----------------------------<  322<H>  4.9   |  21  |  0.7    Ca    8.9      08 Jan 2022 07:00    TPro  7.1  /  Alb  3.9  /  TBili  2.3<H>  /  DBili  x   /  AST  129<H>  /  ALT  47<H>  /  AlkPhos  106  01-08    LIVER FUNCTIONS - ( 08 Jan 2022 07:00 )  Alb: 3.9 g/dL / Pro: 7.1 g/dL / ALK PHOS: 106 U/L / ALT: 47 U/L / AST: 129 U/L / GGT: x           PT/INR - ( 08 Jan 2022 07:00 )   PT: 15.20 sec;   INR: 1.33 ratio    PTT - ( 08 Jan 2022 07:00 )  PTT:41.1 sec    CARDIAC MARKERS ( 08 Jan 2022 07:00 )  x     / x     / 757 U/L / x     / x      CARDIAC MARKERS ( 07 Jan 2022 01:45 )  x     / x     / 486 U/L / x     / x            EXAM:  General/Neuro  GCS: 15  Exam: Normal, NAD, alert, oriented x 3, no focal deficits. PERRLA, EOMI    Respiratory  Exam: Lungs clear to auscultation, Normal expansion/effort.   -tenderness over left sided chest wall area    Cardiovascular  Exam: S1, S2.  Regular rate and rhythm.   Cardiac Rhythm: Normal Sinus Rhythm    GI  Exam: Abdomen soft, Non-tender, Non-distended.      Extremities  Exam: Extremities warm, well-perfused.  Trace peripheral edema b/l LE    Derm:  Exam: erythema b/l LE      :   Exam: No Oliver catheter      Tubes/Lines/Drains    [x] Peripheral IV

## 2022-01-08 NOTE — ED PROVIDER NOTE - ATTENDING CONTRIBUTION TO CARE
50 y/o male with PMH of DM, HTN, polysubstance abuse on suboxone, and EtOH abuse presents to the ED brought in by EMS for fall and ETOH abuse. Pt noted that he fell on the left side and now theres pain in the left rib. no SOB no urinary sx + diffuse bruises. Well appearing, NAD, intoxicated. NCAT PERRLA EOMI neck supple non tender normal wob cta bl rrr abdomen s nt nd no rebound no guarding WWPx4 neuro non focal, multiple new bruises in the arms abdomen, back, tender to palpation no midline tenderness. + chronic bruise on right arm.  Plan is CT xrays r/o trauma.

## 2022-01-08 NOTE — ED PROVIDER NOTE - NS ED ROS FT
Constitutional:  No fevers or chills.  Eyes:  No visual changes, eye pain, or discharge.  ENT:  No hearing changes. No sore throat.  Neck:  No neck pain or stiffness.  Cardiac:  (+) chest wall pain. No CP or edema.  Resp:  No cough or SOB. No hemoptysis.   GI:  No nausea, vomiting, diarrhea, or abdominal pain.  :  No dysuria, frequency, or hematuria.  MSK:  No myalgias or joint pain/swelling.  Neuro:  No headache, dizziness, or weakness.  Skin: (+) bruising  No skin rash.

## 2022-01-08 NOTE — CONSULT NOTE ADULT - ASSESSMENT
Assessment & Plan   50yo male with PMH DM-II, neuropathy, HTN and substance abuse/ETOH abuse (on Suboxone, uses tobacco), drinks 2 pints of vodka per day, s/p mechanical fall, ETOH level 380, with acute Left sided 6-9th rib fxs, T8 vertebral body fx.    NEURO:  #h/o ETOH/substance abuse  -hold suboxone for now  -CIWA protocol, Ativan if necessary for DTs  #T8 vertebral body fx  -Neurosurgery c/s pending    -Acute pain-controlled with tylenol ATC and Ibuprofen  -continue Gabapentin 300mg q8  -Lidoderm patch  -Oxy prn if absolutely needs    RESP:   # Left sided 6-9th rib fxs  -pain control  -satting well on RA  -encourage IS  -f/up CXR  -  Activity- OOB     CARDS:   #HTN  -resume home Lisinopril  -f/up ECG  -->757, f/up repeat    GI/NUTR:   -may start Dash diet   -GI Prophylaxis- PPI   - Bowel regimen- Senna    /RENAL:        Monitor UO- no regalado   -IVF: LR @ 125/hr  -LA 5.6, bolus IVF, f/up rept    Labs:          BUN/Cr- 11/0.8  -->,  12/0.7  -->          Electrolytes-Na 136 // K 4.9 // Mg -- //  Phos -- (01-08 @ 07:00)    HEME/ONC:       DVT prophylaxis- Lovenox, SCDs    Labs: Hb/Hct:  13.9/42.2  -->,  13.2/39.7  -->                      Plts:  57  -->,  82  -->                 PTT/INR:  41.1/1.33  --->       ID:  WBC- 3.87  --->>,  7.37  --->>  Temp trend- 24hrs T(F): 96.9 (01-08 @ 10:29), Max: 97 (01-08 @ 07:15)  Antibiotics- none      ENDO:  #DM  -hold home Metformin for now  -Blood glucose 286 on BMP, tight RISS  -follow FS q4h while NPO, and QAc and QHS once on diet  -HA1C in am     LINES/DRAINS:  PIV    DISPO:    SDU  -approved by Dr. Anglin Assessment & Plan   52yo male with PMH DM-II, neuropathy, HTN and substance abuse/ETOH abuse (on Suboxone, uses tobacco), drinks 2 pints of vodka per day, s/p mechanical fall, ETOH level 380, with acute Left sided 6-9th rib fxs, T8 vertebral body fx.    NEURO:  #h/o ETOH/substance abuse  -resume home suboxone  -CIWA protocol, Ativan if necessary for DTs  #T8 vertebral body fx  -Neurosurgery c/s pending    -Acute pain-controlled with tylenol ATC and Ibuprofen  -continue Gabapentin 300mg q8  -Lidoderm patch    RESP:   # Left sided 6-9th rib fxs  -pain control  -satting well on RA  -encourage IS  -f/up CXR  -  Activity- OOB     CARDS:   #HTN  -resume home Lisinopril  -f/up ECG  -->757, f/up repeat    GI/NUTR:   -may start Dash diet   -GI Prophylaxis- PPI   - Bowel regimen- Senna    /RENAL:        Monitor UO- no regalado   -IVF: banana bag @ 100/hr  -LA 5.6, bolus IVF, f/up rept    Labs:          BUN/Cr- 11/0.8  -->,  12/0.7  -->          Electrolytes-Na 136 // K 4.9 // Mg -- //  Phos -- (01-08 @ 07:00)    HEME/ONC:       DVT prophylaxis- Lovenox, SCDs    Labs: Hb/Hct:  13.9/42.2  -->,  13.2/39.7  -->                      Plts:  57  -->,  82  -->                 PTT/INR:  41.1/1.33  --->       ID:  WBC- 3.87  --->>,  7.37  --->>  Temp trend- 24hrs T(F): 96.9 (01-08 @ 10:29), Max: 97 (01-08 @ 07:15)  Antibiotics- none      ENDO:  #DM  -hold home Metformin for now  -Blood glucose 286 on BMP, tight RISS  -follow FS q4h while NPO, and QAc and QHS once on diet  -HA1C in am     LINES/DRAINS:  PIV    DISPO:    SDU  -approved by Dr. Anglin

## 2022-01-08 NOTE — H&P ADULT - ASSESSMENT
50 yo M with a PMH of DM, prior opioid abuse on suboxone, & active alcohol abuse admitted s/p mechanical fall with left sided rib fractures 6, 7, 8, & 9 as well as T8 vertebral body fracture.   -admit to trauma, SDU for close monitoring for EtOH withdrawl  -multimodal pain control. continue home suboxone  -aggressive pulmonary toilet, IS, OOB ad mary  -NPO/IVF for now  -[facility] protocol  -1L bolus IVF. repeat lactate  -SCDs & lovenox for DVT prophylaxis  -discussed with Dr. Anglin

## 2022-01-08 NOTE — ED PROVIDER NOTE - CARE PLAN
1 Principal Discharge DX:	Multiple fractures of ribs of left side  Secondary Diagnosis:	Alcohol intoxication, uncomplicated   Principal Discharge DX:	Multiple fractures of ribs of left side  Secondary Diagnosis:	Closed fracture of thoracic vertebral body  Secondary Diagnosis:	Rhabdomyolysis  Secondary Diagnosis:	Alcohol intoxication

## 2022-01-08 NOTE — ED PROVIDER NOTE - NSICDXPASTMEDICALHX_GEN_ALL_CORE_FT
PAST MEDICAL HISTORY:  Alcohol dependency     Diabetes     DM (diabetes mellitus)     HTN (hypertension)

## 2022-01-08 NOTE — ED ADULT TRIAGE NOTE - BEFAST SPEECH PHRASE
Nursing Transfer Note    Receiving Transfer Note    12/31/2018 10:10 PM  Received in transfer from PACU to Peds room 411  Report received as documented in PER Handoff on Doc Flowsheet.  See Doc Flowsheet for VS's and complete assessment.  Continuous EKG monitoring in place No  Chart received with patient: Yes  What Caregiver / Guardian was Notified of Arrival: Mother  Patient and / or caregiver / guardian oriented to room and nurse call system.  SAPNA Nunn RN  12/31/2018 10:10 PM         Yes

## 2022-01-08 NOTE — H&P ADULT - ATTENDING COMMENTS
Trauma Attending H&P Attestation    Patient seen and evaluated with the trauma team in the trauma bay upon arrival. All pertinent labs and radiographic imaging reviewed, pending final reports. Outpatient medications reviewed, including the presence of anticoagulants, if applicable. I agree with the resident's note above, including the physical exam findings, assessment and plan as documented with the following adjustments.     Trauma Level: [ ] Code  [ ] Alert  [ ] Consult [x ] Transfer in  Activation by:  [ ] ED physician [ ] EMS  Intubated in Field? [ ] Yes [ x] No  Intubated in ED? [ ] Yes [x ] No  Intubated in Trauma [place]? [ ] Yes [x ] No    GINO STEWART Patient is a 51y old  Male who presents with a chief complaint of frequent falls with multiple rib fractures     Patient presented with GCS [15 ]  upon arrival to the trauma bay.  Allergies  No Known Allergies  Intolerances    PAST MEDICAL & SURGICAL HISTORY:  HTN (hypertension)  DM (diabetes mellitus)  Alcohol dependency  Diabetes  History of incision and drainage    On AC/Antiplatelets [ ] Yes [ x] No              [ ] NOVACs, [ ] Coumadin, [ ] ASA, [ ] Antiplatelets     Vital Signs Last 24 Hrs  T(C): 36.9 (08 Jan 2022 16:00), Max: 36.9 (08 Jan 2022 16:00)  T(F): 98.5 (08 Jan 2022 16:00), Max: 98.5 (08 Jan 2022 16:00)  HR: 84 (08 Jan 2022 16:00) (70 - 85)  BP: 128/72 (08 Jan 2022 16:00) (100/68 - 143/78)  BP(mean): 92 (08 Jan 2022 16:00) (92 - 92)  RR: 20 (08 Jan 2022 16:00) (18 - 20)  SpO2: 98% (08 Jan 2022 16:00) (95% - 98%)    PE: bruises in multiple stage of healing     Assessment: Fall with multiple ribs fractures                     Thoracic (T6) fracture    PLAN  - Admit to Trauma service  - DT prophylaxis  - supportive care  - GI/DVT prophylaxis  - pain management  - repeat studies as needed  - complete and follow up on trauma work up included but not limites to                          [x ] CXR [x ] PXR [x ] Extremities X-RAYs                          [x ] NCHCT [x ] C-Spine CT [x ] CT Chest [ x] CT Abdomen/Pelvis                          [ x] FAST [ ] Other                          [x ] Trauma Labs                          [ ] Toxicology   - Follow up Consults  [x ] Neurosurgery [ ] Orthopaedics [ ] Plastics [ ] Fascial/OMFS [ ] Opthalmology [ ] Medicine [ ] Cardiology [x] SICU/SDU  - IV ABx give as indicated [ ] Yes [x] No  - Tetanus given as indicated [ ] Yes [x ] No    Mehnaz Anglin MD, FACS  Trauma/ACS/Surgical Critical Care Attending

## 2022-01-08 NOTE — PATIENT PROFILE ADULT - FALL HARM RISK - HARM RISK INTERVENTIONS

## 2022-01-08 NOTE — ED PROVIDER NOTE - SECONDARY DIAGNOSIS.
Alcohol intoxication, uncomplicated Closed fracture of thoracic vertebral body Alcohol intoxication Rhabdomyolysis

## 2022-01-08 NOTE — ED PROVIDER NOTE - PROGRESS NOTE DETAILS
CT reviewed. Pt with multiple rib fractures. Dr. Anglin accepted transfer North. Dr. Luu aware. Bruising noted to right mid and lower back. Advised of rib fractures, will transfer North for trauma eval. Eliseo: pt arrived at [facility] ED. VSS. Currently sleeping in stretcher in [facility].  Trauma team notified of pt arrival.

## 2022-01-08 NOTE — H&P ADULT - HISTORY OF PRESENT ILLNESS
TRAUMA ACTIVATION LEVEL:  trauma level 2 transfer from [facility]    MECHANISM OF INJURY:   mechanical fall    GCS: 	E: 4	V: 5	M: 6    HPI: Pt is a 52 yo M with a hx of DM, prior opioid abuse on suboxone, & active alcohol abuse who presents as a trauma transfer from [facility] for multiple rib fractures. Pt reports he was seen in the ER yesterday at the request of his sister due to alcohol abuse; he was discharged home but continued to drink and had a trip and fall last night for which his sister reportedly called an ambulance. The pt states he remembers falling but doesn't remember how he got to the ER. Complaining of right sided chest pain and a headache. Denies fevers, chills, shortness of breath, nausea, vomiting, abdominal pain, changes in bowel habits, or dysuria. Pt endorses daily consumption of 2 pints of vodka as well as several beers. Denies current drug use, auditory or visual hallucinations.     PAST MEDICAL & SURGICAL HISTORY:  HTN (hypertension)  DM (diabetes mellitus)  Alcohol dependency  Diabetes  History of incision and drainage    Allergies:  No Known Allergies    Home Medications:  buprenorphine-naloxone 8 mg-2 mg sublingual tablet:  sublingual  (07 Jan 2022 18:02)  lisinopril:  (07 Jan 2022 18:02)    ROS: 10-system review is otherwise negative except HPI above.      Primary Survey:    A - airway intact  B - bilateral breath sounds and good chest rise  C - palpable pulses in all extremities  D - GCS 15 on arrival, PALOMO  Exposure obtained    Vital Signs Last 24 Hrs  T(C): 36.1 (08 Jan 2022 10:29), Max: 36.1 (08 Jan 2022 07:15)  T(F): 96.9 (08 Jan 2022 10:29), Max: 97 (08 Jan 2022 07:15)  HR: 80 (08 Jan 2022 10:29) (70 - 85)  BP: 137/71 (08 Jan 2022 10:29) (100/68 - 143/78)  BP(mean): --  RR: 18 (08 Jan 2022 10:29) (18 - 20)  SpO2: 96% (08 Jan 2022 10:29) (95% - 96%)    Secondary Survey:   General: NAD  HEENT: Normocephalic, EOMI, PEERLA. no scalp lacerations. 3cm circular area of ecchymosis over left cheekbone and small area above left eyebrow  Neck: Soft, midline trachea. no cspine tenderness  Chest: No subcutaneous emphysema. tender over left lateral chest wall where there is also some ecchymosis  Cardiac: S1, S2, RRR  Respiratory: Bilateral breath sounds, clear and equal bilaterally. good effort  Abdomen: large area of ecchymosis over epigastrium. Soft, non-distended, non-tender, no rebound,   Groin: Normal appearing, pelvis stable   Ext: palp radial b/l UE, b/l DP palp in Lower Extrem. stasis changes. multiple bruises in various stages of healing over all extremities. abrasions to b/l knees.  Back: no TTP, no palpable runoff/stepoff/deformity. multiple bruises in various stages of healing over b/l flanks and sacrum. large area of bruising over left buttock.    FAST: not done    Procedures: none    LABS:  Labs:  CAPILLARY BLOOD GLUCOSE    POCT Blood Glucose.: 286 mg/dL (08 Jan 2022 06:41)  POCT Blood Glucose.: 271 mg/dL (07 Jan 2022 17:53)                          13.2   7.37  )-----------( 82       ( 08 Jan 2022 07:00 )             39.7       Auto Neutrophil %: 64.8 % (01-08-22 @ 07:00)  Auto Immature Granulocyte %: 0.3 % (01-08-22 @ 07:00)    01-08    136  |  95<L>  |  12  ----------------------------<  322<H>  4.9   |  21  |  0.7    Calcium, Total Serum: 8.9 mg/dL (01-08-22 @ 07:00)    LFTs:             7.1  | 2.3  | 129      ------------------[106     ( 08 Jan 2022 07:00 )  3.9  | x    | 47          Lipase:29     Amylase:       Lactate, Blood: 5.6 mmol/L (01-08-22 @ 07:00)  Lactate, Blood: 3.9 mmol/L (01-07-22 @ 04:40)  Blood Gas Venous - Lactate: 5.90 mmol/L (01-07-22 @ 00:29)    Coags:     15.20  ----< 1.33    ( 08 Jan 2022 07:00 )     41.1      CARDIAC MARKERS ( 08 Jan 2022 07:00 )  x     / x     / 757 U/L / x     / x      CARDIAC MARKERS ( 07 Jan 2022 01:45 )  x     / x     / 486 U/L / x     / x        Alcohol, Blood: 380 mg/dL (01-08-22 @ 07:00)

## 2022-01-08 NOTE — CONSULT NOTE ADULT - SUBJECTIVE AND OBJECTIVE BOX
HPI:  TRAUMA ACTIVATION LEVEL:  trauma level 2 transfer from [facility]    MECHANISM OF INJURY:   mechanical fall    GCS: 	E: 4	V: 5	M: 6    HPI: Pt is a 50 yo M with a hx of DM, prior opioid abuse on suboxone, & active alcohol abuse who presents as a trauma transfer from [facility] for multiple rib fractures. Pt reports he was seen in the ER yesterday at the request of his sister due to alcohol abuse; he was discharged home but continued to drink and had a trip and fall last night for which his sister reportedly called an ambulance. The pt states he remembers falling but doesn't remember how he got to the ER. Complaining of right sided chest pain and a headache. Denies fevers, chills, shortness of breath, nausea, vomiting, abdominal pain, changes in bowel habits, or dysuria. Pt endorses daily consumption of 2 pints of vodka as well as several beers. Denies current drug use, auditory or visual hallucinations.     ------------------------------------  This is a 51 year old gentlemen PMH opioid abuse on suboxone, ETOH, originally presenting to [facility] s/p fall yesterday 1.7 going up steps outside while drink, unknown ht? LOC?, no AC/AP.  Pt states he has been feeling unsteady as of late.  In ED CT abd revealing fracture through the T8 vertebral body.  CT C spine/CTH negative.  Pt upgraded to SICU for multi L rib fx and DT watch. At present the pt is resting in bed in NAD.  Pt endorsed continued L rib pain, Pt denies back pain, weakness, paraesthesia, anesthesia.  Pt demonstrate an intact neurological exam, C/T/L spine non TTP, PALOMO x4 5/5, SILT.       PAST MEDICAL & SURGICAL HISTORY:  HTN (hypertension)  DM (diabetes mellitus)  Alcohol dependency  Diabetes  History of incision and drainage    Allergies:  No Known Allergies    Home Medications:  buprenorphine-naloxone 8 mg-2 mg sublingual tablet:  sublingual  (07 Jan 2022 18:02)  lisinopril:  (07 Jan 2022 18:02)    ROS: 10-system review is otherwise negative except HPI above.      Primary Survey:    A - airway intact  B - bilateral breath sounds and good chest rise  C - palpable pulses in all extremities  D - GCS 15 on arrival, PALOMO  Exposure obtained    Vital Signs Last 24 Hrs  T(C): 36.1 (08 Jan 2022 10:29), Max: 36.1 (08 Jan 2022 07:15)  T(F): 96.9 (08 Jan 2022 10:29), Max: 97 (08 Jan 2022 07:15)  HR: 80 (08 Jan 2022 10:29) (70 - 85)  BP: 137/71 (08 Jan 2022 10:29) (100/68 - 143/78)  BP(mean): --  RR: 18 (08 Jan 2022 10:29) (18 - 20)  SpO2: 96% (08 Jan 2022 10:29) (95% - 96%)    Secondary Survey:   General: NAD  HEENT: Normocephalic, EOMI, PEERLA. no scalp lacerations. 3cm circular area of ecchymosis over left cheekbone and small area above left eyebrow  Neck: Soft, midline trachea. no cspine tenderness  Chest: No subcutaneous emphysema. tender over left lateral chest wall where there is also some ecchymosis  Cardiac: S1, S2, RRR  Respiratory: Bilateral breath sounds, clear and equal bilaterally. good effort  Abdomen: large area of ecchymosis over epigastrium. Soft, non-distended, non-tender, no rebound,   Groin: Normal appearing, pelvis stable   Ext: palp radial b/l UE, b/l DP palp in Lower Extrem. stasis changes. multiple bruises in various stages of healing over all extremities. abrasions to b/l knees.  Back: no TTP, no palpable runoff/stepoff/deformity. multiple bruises in various stages of healing over b/l flanks and sacrum. large area of bruising over left buttock.    FAST: not done    Procedures: none    LABS:  Labs:  CAPILLARY BLOOD GLUCOSE    POCT Blood Glucose.: 286 mg/dL (08 Jan 2022 06:41)  POCT Blood Glucose.: 271 mg/dL (07 Jan 2022 17:53)                          13.2   7.37  )-----------( 82       ( 08 Jan 2022 07:00 )             39.7       Auto Neutrophil %: 64.8 % (01-08-22 @ 07:00)  Auto Immature Granulocyte %: 0.3 % (01-08-22 @ 07:00)    01-08    136  |  95<L>  |  12  ----------------------------<  322<H>  4.9   |  21  |  0.7    Calcium, Total Serum: 8.9 mg/dL (01-08-22 @ 07:00)    LFTs:             7.1  | 2.3  | 129      ------------------[106     ( 08 Jan 2022 07:00 )  3.9  | x    | 47          Lipase:29     Amylase:       Lactate, Blood: 5.6 mmol/L (01-08-22 @ 07:00)  Lactate, Blood: 3.9 mmol/L (01-07-22 @ 04:40)  Blood Gas Venous - Lactate: 5.90 mmol/L (01-07-22 @ 00:29)    Coags:     15.20  ----< 1.33    ( 08 Jan 2022 07:00 )     41.1      CARDIAC MARKERS ( 08 Jan 2022 07:00 )  x     / x     / 757 U/L / x     / x      CARDIAC MARKERS ( 07 Jan 2022 01:45 )  x     / x     / 486 U/L / x     / x        Alcohol, Blood: 380 mg/dL (01-08-22 @ 07:00)     (08 Jan 2022 13:38)        PAST MEDICAL & SURGICAL HISTORY:  HTN (hypertension)    DM (diabetes mellitus)    Alcohol dependency    Diabetes    History of incision and drainage        Home Medications:  buprenorphine-naloxone 8 mg-2 mg sublingual tablet:  sublingual  (07 Jan 2022 18:02)  lisinopril:  (07 Jan 2022 18:02)      Allergies    No Known Allergies    Intolerances        ROS:  [x  ] A ten-point review of systems is negative except as noted   [  ] Due to altered mental status/intubation, subjective information were not able to be obtained from the patient. History was obtained, to the extent possible, from review of the chart and collateral sources of information    MEDICATIONS  (STANDING):  acetaminophen     Tablet .. 650 milliGRAM(s) Oral every 6 hours  buprenorphine 8 mG/naloxone 2 mG SL  Tablet 1 Tablet(s) SubLingual daily  dextrose 40% Gel 15 Gram(s) Oral once  dextrose 5%. 1000 milliLiter(s) (50 mL/Hr) IV Continuous <Continuous>  dextrose 50% Injectable 25 Gram(s) IV Push once  enoxaparin Injectable 40 milliGRAM(s) SubCutaneous daily  folic acid 1 milliGRAM(s) Oral daily  gabapentin 300 milliGRAM(s) Oral every 8 hours  glucagon  Injectable 1 milliGRAM(s) IntraMuscular once  ibuprofen  Tablet. 600 milliGRAM(s) Oral every 8 hours  insulin regular  human corrective regimen sliding scale   IV Push Before meals and at bedtime  lidocaine   4% Patch 1 Patch Transdermal every 24 hours  pantoprazole    Tablet 40 milliGRAM(s) Oral before breakfast  senna 2 Tablet(s) Oral at bedtime  sodium chloride 0.9% 1000 milliLiter(s) IV Continuous   sodium chloride 0.9%. 1000 milliLiter(s) (1000 mL/Hr) IV Continuous <Continuous>  thiamine 100 milliGRAM(s) Oral daily    MEDICATIONS  (PRN):      ICU Vital Signs Last 24 Hrs  T(C): 36.9 (08 Jan 2022 16:00), Max: 36.9 (08 Jan 2022 16:00)  T(F): 98.5 (08 Jan 2022 16:00), Max: 98.5 (08 Jan 2022 16:00)  HR: 84 (08 Jan 2022 16:00) (70 - 85)  BP: 128/72 (08 Jan 2022 16:00) (100/68 - 143/78)  BP(mean): 92 (08 Jan 2022 16:00) (92 - 92)  ABP: --  ABP(mean): --  RR: 20 (08 Jan 2022 16:00) (18 - 20)  SpO2: 98% (08 Jan 2022 16:00) (95% - 98%)      I&O's Detail    08 Jan 2022 07:01  -  08 Jan 2022 16:16  --------------------------------------------------------  IN:    Sodium Chloride 0.9% Bolus: 1000 mL  Total IN: 1000 mL    OUT:  Total OUT: 0 mL    Total NET: 1000 mL          CBC Full  -  ( 08 Jan 2022 07:00 )  WBC Count : 7.37 K/uL  RBC Count : 4.01 M/uL  Hemoglobin : 13.2 g/dL  Hematocrit : 39.7 %  Platelet Count - Automated : 82 K/uL  Mean Cell Volume : 99.0 fL  Mean Cell Hemoglobin : 32.9 pg  Mean Cell Hemoglobin Concentration : 33.2 g/dL  Auto Neutrophil # : 4.78 K/uL  Auto Lymphocyte # : 1.70 K/uL  Auto Monocyte # : 0.84 K/uL  Auto Eosinophil # : 0.00 K/uL  Auto Basophil # : 0.03 K/uL  Auto Neutrophil % : 64.8 %  Auto Lymphocyte % : 23.1 %  Auto Monocyte % : 11.4 %  Auto Eosinophil % : 0.0 %  Auto Basophil % : 0.4 %    01-08    136  |  95<L>  |  12  ----------------------------<  322<H>  4.9   |  21  |  0.7    Ca    8.9      08 Jan 2022 07:00    TPro  7.1  /  Alb  3.9  /  TBili  2.3<H>  /  DBili  x   /  AST  129<H>  /  ALT  47<H>  /  AlkPhos  106  01-08    CARDIAC MARKERS ( 08 Jan 2022 07:00 )  x     / x     / 757 U/L / x     / x      CARDIAC MARKERS ( 07 Jan 2022 01:45 )  x     / x     / 486 U/L / x     / x            Laying in bed, in NAD  head is NCAT  NO C/T/L spine tenderness  AAOX3. Verbal function intact  tongue midline, facial motions symmetric  PERRLA, EOMI  Motor: MAEx4, 5/5 power in b/l UE and LE  5/5 plantar dorsi flexion   5/5 B/l   Sensation: [place]         Imaging:  < from: CT Cervical Spine No Cont (01.08.22 @ 07:28) >  IMPRESSION:        No evidence of a cervical spine fracture or subluxation.    Straightening of the cervical lordosis may be secondary to positioning or   muscle spasm.    Bilateral carotid bulb atherosclerosis.    --- End of Report ---            BLANCHE BRADLEY MD; Attending Radiologist  This document has been electronically signed. Jan 8 2022  7:37AM    < end of copied text >  < from: CT Head No Cont (01.08.22 @ 07:28) >  Impression:      Motion degraded exam    No evidence of intracranial hemorrhage, territorial infarct, or mass   effect.    --- End of Report ---            BLANCHE BRADLEY MD; Attending Radiologist  This document has been electronically signed. Jan 8 2022  7:35AM    < end of copied text >  < from: CT Abdomen and Pelvis w/ IV Cont (01.08.22 @ 07:28) >  IMPRESSION:    Multiple left-sided rib fractures.    Fracture through the T8 vertebral body.    Undulation of the hepatic contour may represent early cirrhosis.    Trace nonspecific ascites.    --- End of Report---    ***Please see the addendum at the top of this report. It may contain   additional important information or changes.****      RISHI BANUELOS MD; Resident Radiologist  This document has been electronically signed.  SARA PERDOMO MD; Attending Radiologist  This document has been electronically signed. Jan 8 2022  8:49AM  Addend:SARA PERDOMO MD; Attending Radiologist  This addendum was electronically signed on: Jan 8 2022  9:02AM.    < end of copied text >      Assessment/Plan  This is a 51 year old gentleman s/p fall found with Multi L Rib fx, and T8 VB fx    Images reviewed  No acute Neurosurgical intervention  Pain control  CIWA protocol   Incentive spirometry  DVT prophylaxis  PT/OT rehab

## 2022-01-08 NOTE — ED PROVIDER NOTE - PHYSICAL EXAMINATION
CONSTITUTIONAL: Well-developed; intoxicated smells of urine. gcs 15, speaking in full sentences, moving all extremities  SKIN: warm, dry multiple bruises on right chest, right arm, left arm and central abdomen.   HEAD: Normocephalic; see above  EYES: PERRL, EOMI, no conjunctival erythema  ENT: No nasal discharge; airway clear, mucous membranes moist  NECK: no central neck tenderness.    CARD: +S1, S2 no murmurs, gallops, or rubs. Regular rate and rhythm. radial 2+ b/l, no chest wall tenderness or crepitus  RESP: No wheezes, rales or rhonchi. CTABL  ABD: soft ntnd, no rebound, no guarding, no rigidity  EXT: moves all extremities,  No clubbing, cyanosis or edema.   NEURO: Alert, oriented, grossly unremarkable, cn ii-xii grossly intact, follows commands  PSYCH: Cooperative, appropriate.

## 2022-01-08 NOTE — H&P ADULT - NSHPLABSRESULTS_GEN_ALL_CORE
CTH: Impression:      Motion degraded exam    No evidence of intracranial hemorrhage, territorial infarct, or mass   effect.    --- End of Report ---  BLANCHE BRADLEY MD; Attending Radiologist  This document has been electronically signed. Jan 8 2022  7:35AM    CT C-spine: IMPRESSION:  No evidence of a cervical spine fracture or subluxation.    Straightening of the cervical lordosis may be secondary to positioning or   muscle spasm.    Bilateral carotid bulb atherosclerosis.    --- End of Report ---    BLANCHE BRADLEY MD; Attending Radiologist  This document has been electronically signed. Jan 8 2022  7:37AM    CT Chest, abd, pelvis:   impression: T11-T12 fractured anterior osteophyte. Multiple left-sided rib fractures.    Fracture through the T8 vertebral body.    Undulation of the hepatic contour may represent early cirrhosis.    Trace nonspecific ascites.  --- End of Report---    ***Please see the addendum at the top of this report. It may contain   additional important information or changes.****  RISHI BANUELOS MD; Resident Radiologist  This document has been electronically signed.  SARA PERDOMO MD; Attending Radiologist  This document has been electronically signed. Jan 8 2022  8:49AM  Addend:SARA PERDOMO MD; Attending Radiologist  This addendum was electronically signed on: Jan 8 2022  9:02AM.    Xrays of b/l knees, b/l humerus, and pelvis without signs of acute injury

## 2022-01-08 NOTE — ED PROVIDER NOTE - CLINICAL SUMMARY MEDICAL DECISION MAKING FREE TEXT BOX
Patient presented s/p fall while intoxicated with L rib pain. Otherwise afebrile, HD stable, neurovascularly intact. Seen at [facility] at which time pan scan performed showing (+) multiple L rib fx but no PTX - transferred to [facility] for evaluation. Also with (+) T8 vertebral body fx. Labs showed (+) elevated CK but no hyperkalemia. Consulted trauma who evaluated patient in ED - recommending admission to their service for further management. HD stable at time of admission.

## 2022-01-09 LAB
ALBUMIN SERPL ELPH-MCNC: 3.3 G/DL — LOW (ref 3.5–5.2)
ALP SERPL-CCNC: 88 U/L — SIGNIFICANT CHANGE UP (ref 30–115)
ALT FLD-CCNC: 38 U/L — SIGNIFICANT CHANGE UP (ref 0–41)
ANION GAP SERPL CALC-SCNC: 14 MMOL/L — SIGNIFICANT CHANGE UP (ref 7–14)
APPEARANCE UR: CLEAR — SIGNIFICANT CHANGE UP
AST SERPL-CCNC: 101 U/L — HIGH (ref 0–41)
BILIRUB DIRECT SERPL-MCNC: 1.2 MG/DL — HIGH (ref 0–0.3)
BILIRUB INDIRECT FLD-MCNC: 0.8 MG/DL — SIGNIFICANT CHANGE UP (ref 0.2–1.2)
BILIRUB SERPL-MCNC: 2 MG/DL — HIGH (ref 0.2–1.2)
BILIRUB UR-MCNC: NEGATIVE — SIGNIFICANT CHANGE UP
BUN SERPL-MCNC: 13 MG/DL — SIGNIFICANT CHANGE UP (ref 10–20)
BUN SERPL-MCNC: 13 MG/DL — SIGNIFICANT CHANGE UP (ref 10–20)
BUN SERPL-MCNC: 15 MG/DL — SIGNIFICANT CHANGE UP (ref 10–20)
CALCIUM SERPL-MCNC: 7.6 MG/DL — LOW (ref 8.5–10.1)
CALCIUM SERPL-MCNC: 7.8 MG/DL — LOW (ref 8.5–10.1)
CALCIUM SERPL-MCNC: 8.2 MG/DL — LOW (ref 8.5–10.1)
CHLORIDE SERPL-SCNC: 96 MMOL/L — LOW (ref 98–110)
CHLORIDE SERPL-SCNC: 96 MMOL/L — LOW (ref 98–110)
CHLORIDE SERPL-SCNC: 98 MMOL/L — SIGNIFICANT CHANGE UP (ref 98–110)
CK SERPL-CCNC: 347 U/L — HIGH (ref 0–225)
CO2 SERPL-SCNC: 21 MMOL/L — SIGNIFICANT CHANGE UP (ref 17–32)
CO2 SERPL-SCNC: 23 MMOL/L — SIGNIFICANT CHANGE UP (ref 17–32)
CO2 SERPL-SCNC: 23 MMOL/L — SIGNIFICANT CHANGE UP (ref 17–32)
COLOR SPEC: YELLOW — SIGNIFICANT CHANGE UP
CREAT SERPL-MCNC: 0.5 MG/DL — LOW (ref 0.7–1.5)
CREAT SERPL-MCNC: 0.5 MG/DL — LOW (ref 0.7–1.5)
CREAT SERPL-MCNC: 0.6 MG/DL — LOW (ref 0.7–1.5)
DIFF PNL FLD: NEGATIVE — SIGNIFICANT CHANGE UP
GLUCOSE BLDC GLUCOMTR-MCNC: 227 MG/DL — HIGH (ref 70–99)
GLUCOSE BLDC GLUCOMTR-MCNC: 251 MG/DL — HIGH (ref 70–99)
GLUCOSE BLDC GLUCOMTR-MCNC: 267 MG/DL — HIGH (ref 70–99)
GLUCOSE BLDC GLUCOMTR-MCNC: 275 MG/DL — HIGH (ref 70–99)
GLUCOSE SERPL-MCNC: 169 MG/DL — HIGH (ref 70–99)
GLUCOSE SERPL-MCNC: 176 MG/DL — HIGH (ref 70–99)
GLUCOSE SERPL-MCNC: 213 MG/DL — HIGH (ref 70–99)
GLUCOSE UR QL: ABNORMAL
HCT VFR BLD CALC: 31.3 % — LOW (ref 42–52)
HCT VFR BLD CALC: 32.1 % — LOW (ref 42–52)
HGB BLD-MCNC: 10.6 G/DL — LOW (ref 14–18)
HGB BLD-MCNC: 11 G/DL — LOW (ref 14–18)
KETONES UR-MCNC: SIGNIFICANT CHANGE UP
LACTATE SERPL-SCNC: 1.9 MMOL/L — SIGNIFICANT CHANGE UP (ref 0.7–2)
LEUKOCYTE ESTERASE UR-ACNC: NEGATIVE — SIGNIFICANT CHANGE UP
MAGNESIUM SERPL-MCNC: 1.7 MG/DL — LOW (ref 1.8–2.4)
MAGNESIUM SERPL-MCNC: 1.8 MG/DL — SIGNIFICANT CHANGE UP (ref 1.8–2.4)
MAGNESIUM SERPL-MCNC: 1.8 MG/DL — SIGNIFICANT CHANGE UP (ref 1.8–2.4)
MCHC RBC-ENTMCNC: 33 PG — HIGH (ref 27–31)
MCHC RBC-ENTMCNC: 33.6 PG — HIGH (ref 27–31)
MCHC RBC-ENTMCNC: 33.9 G/DL — SIGNIFICANT CHANGE UP (ref 32–37)
MCHC RBC-ENTMCNC: 34.3 G/DL — SIGNIFICANT CHANGE UP (ref 32–37)
MCV RBC AUTO: 97.5 FL — HIGH (ref 80–94)
MCV RBC AUTO: 98.2 FL — HIGH (ref 80–94)
NITRITE UR-MCNC: NEGATIVE — SIGNIFICANT CHANGE UP
NRBC # BLD: 0 /100 WBCS — SIGNIFICANT CHANGE UP (ref 0–0)
NRBC # BLD: 0 /100 WBCS — SIGNIFICANT CHANGE UP (ref 0–0)
PH UR: 6 — SIGNIFICANT CHANGE UP (ref 5–8)
PHOSPHATE SERPL-MCNC: 2.3 MG/DL — SIGNIFICANT CHANGE UP (ref 2.1–4.9)
PHOSPHATE SERPL-MCNC: 3.2 MG/DL — SIGNIFICANT CHANGE UP (ref 2.1–4.9)
PHOSPHATE SERPL-MCNC: 3.3 MG/DL — SIGNIFICANT CHANGE UP (ref 2.1–4.9)
PLATELET # BLD AUTO: 33 K/UL — LOW (ref 130–400)
PLATELET # BLD AUTO: 35 K/UL — LOW (ref 130–400)
POTASSIUM SERPL-MCNC: 4.3 MMOL/L — SIGNIFICANT CHANGE UP (ref 3.5–5)
POTASSIUM SERPL-MCNC: 4.4 MMOL/L — SIGNIFICANT CHANGE UP (ref 3.5–5)
POTASSIUM SERPL-MCNC: 4.6 MMOL/L — SIGNIFICANT CHANGE UP (ref 3.5–5)
POTASSIUM SERPL-SCNC: 4.3 MMOL/L — SIGNIFICANT CHANGE UP (ref 3.5–5)
POTASSIUM SERPL-SCNC: 4.4 MMOL/L — SIGNIFICANT CHANGE UP (ref 3.5–5)
POTASSIUM SERPL-SCNC: 4.6 MMOL/L — SIGNIFICANT CHANGE UP (ref 3.5–5)
PROT SERPL-MCNC: 5.7 G/DL — LOW (ref 6–8)
PROT UR-MCNC: SIGNIFICANT CHANGE UP
RBC # BLD: 3.21 M/UL — LOW (ref 4.7–6.1)
RBC # BLD: 3.27 M/UL — LOW (ref 4.7–6.1)
RBC # FLD: 13.9 % — SIGNIFICANT CHANGE UP (ref 11.5–14.5)
RBC # FLD: 13.9 % — SIGNIFICANT CHANGE UP (ref 11.5–14.5)
SODIUM SERPL-SCNC: 131 MMOL/L — LOW (ref 135–146)
SODIUM SERPL-SCNC: 133 MMOL/L — LOW (ref 135–146)
SODIUM SERPL-SCNC: 135 MMOL/L — SIGNIFICANT CHANGE UP (ref 135–146)
SP GR SPEC: 1.03 — HIGH (ref 1.01–1.03)
UROBILINOGEN FLD QL: ABNORMAL
WBC # BLD: 2.7 K/UL — LOW (ref 4.8–10.8)
WBC # BLD: 2.74 K/UL — LOW (ref 4.8–10.8)
WBC # FLD AUTO: 2.7 K/UL — LOW (ref 4.8–10.8)
WBC # FLD AUTO: 2.74 K/UL — LOW (ref 4.8–10.8)

## 2022-01-09 PROCEDURE — 99291 CRITICAL CARE FIRST HOUR: CPT

## 2022-01-09 PROCEDURE — 99231 SBSQ HOSP IP/OBS SF/LOW 25: CPT

## 2022-01-09 PROCEDURE — 71045 X-RAY EXAM CHEST 1 VIEW: CPT | Mod: 26

## 2022-01-09 RX ORDER — DEXTROSE 50 % IN WATER 50 %
25 SYRINGE (ML) INTRAVENOUS ONCE
Refills: 0 | Status: DISCONTINUED | OUTPATIENT
Start: 2022-01-09 | End: 2022-01-10

## 2022-01-09 RX ORDER — LANOLIN ALCOHOL/MO/W.PET/CERES
1 CREAM (GRAM) TOPICAL AT BEDTIME
Refills: 0 | Status: COMPLETED | OUTPATIENT
Start: 2022-01-09 | End: 2022-01-09

## 2022-01-09 RX ORDER — GABAPENTIN 400 MG/1
300 CAPSULE ORAL ONCE
Refills: 0 | Status: DISCONTINUED | OUTPATIENT
Start: 2022-01-09 | End: 2022-01-09

## 2022-01-09 RX ORDER — GABAPENTIN 400 MG/1
300 CAPSULE ORAL ONCE
Refills: 0 | Status: COMPLETED | OUTPATIENT
Start: 2022-01-09 | End: 2022-01-09

## 2022-01-09 RX ORDER — SODIUM CHLORIDE 9 MG/ML
1000 INJECTION, SOLUTION INTRAVENOUS
Refills: 0 | Status: DISCONTINUED | OUTPATIENT
Start: 2022-01-09 | End: 2022-01-10

## 2022-01-09 RX ORDER — DEXTROSE 50 % IN WATER 50 %
15 SYRINGE (ML) INTRAVENOUS ONCE
Refills: 0 | Status: DISCONTINUED | OUTPATIENT
Start: 2022-01-09 | End: 2022-01-10

## 2022-01-09 RX ORDER — ACETAMINOPHEN 500 MG
975 TABLET ORAL EVERY 6 HOURS
Refills: 0 | Status: DISCONTINUED | OUTPATIENT
Start: 2022-01-09 | End: 2022-01-12

## 2022-01-09 RX ORDER — INSULIN GLARGINE 100 [IU]/ML
10 INJECTION, SOLUTION SUBCUTANEOUS EVERY MORNING
Refills: 0 | Status: DISCONTINUED | OUTPATIENT
Start: 2022-01-09 | End: 2022-01-10

## 2022-01-09 RX ORDER — LISINOPRIL 2.5 MG/1
10 TABLET ORAL DAILY
Refills: 0 | Status: DISCONTINUED | OUTPATIENT
Start: 2022-01-09 | End: 2022-01-12

## 2022-01-09 RX ORDER — GABAPENTIN 400 MG/1
600 CAPSULE ORAL EVERY 8 HOURS
Refills: 0 | Status: DISCONTINUED | OUTPATIENT
Start: 2022-01-09 | End: 2022-01-12

## 2022-01-09 RX ORDER — MAGNESIUM SULFATE 500 MG/ML
2 VIAL (ML) INJECTION ONCE
Refills: 0 | Status: COMPLETED | OUTPATIENT
Start: 2022-01-09 | End: 2022-01-09

## 2022-01-09 RX ORDER — DEXTROSE 50 % IN WATER 50 %
12.5 SYRINGE (ML) INTRAVENOUS ONCE
Refills: 0 | Status: DISCONTINUED | OUTPATIENT
Start: 2022-01-09 | End: 2022-01-10

## 2022-01-09 RX ORDER — INSULIN LISPRO 100/ML
5 VIAL (ML) SUBCUTANEOUS
Refills: 0 | Status: DISCONTINUED | OUTPATIENT
Start: 2022-01-09 | End: 2022-01-10

## 2022-01-09 RX ADMIN — Medication 2 MILLIGRAM(S): at 17:06

## 2022-01-09 RX ADMIN — INSULIN HUMAN 9: 100 INJECTION, SOLUTION SUBCUTANEOUS at 11:45

## 2022-01-09 RX ADMIN — Medication 100 MILLIGRAM(S): at 11:47

## 2022-01-09 RX ADMIN — Medication 600 MILLIGRAM(S): at 14:24

## 2022-01-09 RX ADMIN — GABAPENTIN 600 MILLIGRAM(S): 400 CAPSULE ORAL at 21:44

## 2022-01-09 RX ADMIN — Medication 975 MILLIGRAM(S): at 17:00

## 2022-01-09 RX ADMIN — Medication 1 MILLIGRAM(S): at 11:49

## 2022-01-09 RX ADMIN — Medication 2 MILLIGRAM(S): at 11:54

## 2022-01-09 RX ADMIN — Medication 975 MILLIGRAM(S): at 18:40

## 2022-01-09 RX ADMIN — INSULIN GLARGINE 10 UNIT(S): 100 INJECTION, SOLUTION SUBCUTANEOUS at 14:33

## 2022-01-09 RX ADMIN — PANTOPRAZOLE SODIUM 40 MILLIGRAM(S): 20 TABLET, DELAYED RELEASE ORAL at 06:20

## 2022-01-09 RX ADMIN — INSULIN HUMAN 7: 100 INJECTION, SOLUTION SUBCUTANEOUS at 14:35

## 2022-01-09 RX ADMIN — INSULIN HUMAN 11: 100 INJECTION, SOLUTION SUBCUTANEOUS at 21:48

## 2022-01-09 RX ADMIN — LIDOCAINE 1 PATCH: 4 CREAM TOPICAL at 06:20

## 2022-01-09 RX ADMIN — Medication 600 MILLIGRAM(S): at 00:19

## 2022-01-09 RX ADMIN — Medication 5 UNIT(S): at 17:03

## 2022-01-09 RX ADMIN — GABAPENTIN 300 MILLIGRAM(S): 400 CAPSULE ORAL at 06:20

## 2022-01-09 RX ADMIN — Medication 600 MILLIGRAM(S): at 21:44

## 2022-01-09 RX ADMIN — Medication 650 MILLIGRAM(S): at 00:43

## 2022-01-09 RX ADMIN — Medication 600 MILLIGRAM(S): at 06:20

## 2022-01-09 RX ADMIN — ENOXAPARIN SODIUM 40 MILLIGRAM(S): 100 INJECTION SUBCUTANEOUS at 11:48

## 2022-01-09 RX ADMIN — Medication 975 MILLIGRAM(S): at 17:07

## 2022-01-09 RX ADMIN — BUPRENORPHINE AND NALOXONE 1 TABLET(S): 2; .5 TABLET SUBLINGUAL at 16:29

## 2022-01-09 RX ADMIN — LISINOPRIL 10 MILLIGRAM(S): 2.5 TABLET ORAL at 14:23

## 2022-01-09 RX ADMIN — GABAPENTIN 300 MILLIGRAM(S): 400 CAPSULE ORAL at 11:27

## 2022-01-09 RX ADMIN — Medication 975 MILLIGRAM(S): at 11:48

## 2022-01-09 RX ADMIN — Medication 600 MILLIGRAM(S): at 06:19

## 2022-01-09 RX ADMIN — Medication 600 MILLIGRAM(S): at 02:23

## 2022-01-09 RX ADMIN — GABAPENTIN 600 MILLIGRAM(S): 400 CAPSULE ORAL at 14:23

## 2022-01-09 RX ADMIN — INSULIN HUMAN 9: 100 INJECTION, SOLUTION SUBCUTANEOUS at 17:02

## 2022-01-09 RX ADMIN — Medication 25 GRAM(S): at 11:27

## 2022-01-09 RX ADMIN — LIDOCAINE 1 PATCH: 4 CREAM TOPICAL at 16:06

## 2022-01-09 NOTE — PROGRESS NOTE ADULT - ASSESSMENT
52 yo M with a PMH of DM, prior opioid abuse on suboxone, & active alcohol abuse admitted s/p mechanical fall with left sided rib fractures 6, 7, 8, & 9 as well as T8 vertebral body fracture.     -SDU managment  for close monitoring for EtOH withdrawl  -multimodal pain control. continue home suboxone  -aggressive pulmonary toilet, IS, OOB ad mary  -NPO/IVF for now  -[facility] protocol  -SCDs & lovenox for DVT prophylaxis

## 2022-01-09 NOTE — CHART NOTE - NSCHARTNOTEFT_GEN_A_CORE
Neurosurgery Sign Off Note    Pt seen and examined at the bedside in SICU.  At present time the pt is resting in bed, in NAD.  Pt continued to demonstrate a stable neurological exam, MAEx4 5/5, SILT, no CTL spine TTP, no appreciable deficits.  Pt denies weakness, paraetshsa/anaesthias, radicular pain.  CT abd reviewed with Dr Hawthorne.  No further Neurosurgical w/u required.      Images reviewed  No Neurosurigcal Intervention  Pain control  Insentive spriometry  DVT prophlaxis  Pt to f/u with Neurosurgery Dr Hawthorne outpt x1-2 weeks from discharge    Neurosurgery Sign off, please reconsult PRN Neurosurgery Sign Off Note    Pt seen and examined at the bedside in SICU.  At present time the pt is resting in bed, in NAD.  Pt continued to demonstrate a stable neurological exam, MAEx4 5/5, SILT, no CTL spine TTP, no appreciable deficits.  Pt denies weakness, paraesthesia/anaesthesia, radicular pain.  CT abd reviewed with Dr Hawthorne.  No further Neurosurgical w/u required.      Images reviewed  No Neurosurigcal Intervention  Pain control  Insentive spriometry  DVT prophlaxis  Pt to f/u with Neurosurgery Dr Hawthorne outpt x1-2 weeks from discharge    Neurosurgery Sign off, please reconsult PRN

## 2022-01-09 NOTE — PROGRESS NOTE ADULT - ASSESSMENT
Assessment & Plan   50yo male with PMH DM-II, neuropathy, HTN and substance abuse/ETOH abuse (on Suboxone, uses tobacco), drinks 2 pints of vodka per day, s/p mechanical fall, ETOH level 380, with acute Left sided 6-9th rib fxs, T8 vertebral body fx.    NEURO:  #h/o ETOH/substance abuse  -resume home suboxone  -CIWA protocol, Ativan if necessary for DTs  #T8 vertebral body fx -->no acute neurosurgical intervention, pain control    -Acute pain-controlled with tylenol ATC and Ibuprofen  -continue Gabapentin 300mg q8  -Lidoderm patch    RESP:   # Left sided 6-9th rib fxs  -pain control  -satting well on RA  -encourage IS  -f/up CXR  -  Activity- OOB     CARDS:   #HTN  - holding home Lisinopril 2/2 borderline BP  -f/up ECG  -->757 > 466 > 347    GI/NUTR:   -Diet: DASH  -GI Prophylaxis- PPI  -Bowel regimen- Senna    /RENAL:        Monitor UO- no regalado   -IVF: banana bag @ 100/hr  -LA 5.6 > 2.2 > 1.9 after IVF resuscitation    Labs:          BUN/Cr- 13/0.5          Electrolytes-Na 133 // K 4.3 // Mg 1.8 //  Phos 3.2    HEME/ONC:       DVT prophylaxis- Lovenox, SCDs    Labs: Hgb 13 > 10.6 > __              Plts:  57  -->,  82  -->   35 > __    ID:  WBC- 3.87  --->>,  7.37  --->> > 2.7 > __  Afebrile  Antibiotics- none  UA neg      ENDO:  #DM (A1C 9 (7/2021))  -hold home Metformin for now  -hyperglycemia, on tight RISS, monitoring FS -- consider endo c/s   -f/u HA1C      LINES/DRAINS:  PIV    DISPO:    SDU     Assessment & Plan   50yo male with PMH DM-II, neuropathy, HTN and substance abuse/ETOH abuse (on Suboxone, uses tobacco), drinks 2 pints of vodka per day, s/p mechanical fall, ETOH level 380, with acute Left sided 6-9th rib fxs, T8 vertebral body fx.    NEURO:  #h/o ETOH/substance abuse  -resume home suboxone  -CIWA protocol, Ativan if necessary for DTs  #T8 vertebral body fx -->no acute neurosurgical intervention, pain control    -Acute pain-controlled with tylenol ATC and Ibuprofen  -continue Gabapentin 300mg q8  -Lidoderm patch    RESP:   # Left sided 6-9th rib fxs  -pain control  -satting well on RA  -encourage IS  -f/up CXR  -  Activity- OOB     CARDS:   #HTN  - holding home Lisinopril 2/2 borderline BP, restarted this am  -f/up ECG  -->757 > 466 > 347    GI/NUTR:   -Diet: DASH  -GI Prophylaxis- PPI  -Bowel regimen- Senna    /RENAL:        Monitor UO- no regalado   -IVF: banana bag @ 100/hr  -LA 5.6 > 2.2 > 1.9 after IVF resuscitation    Labs:          BUN/Cr- 13/0.5->13/0.6          Electrolytes-Na 133 // K 4.6 // Mg 1.7 //  Phos 3.3    HEME/ONC:       DVT prophylaxis- Lovenox, SCDs    Labs: Hgb 13 > 10.6 > _11/32.1_              Plts:  57  -->,  82  -->   35 > 33__    ID:  WBC- 3.87  --->>,  7.37  --->> > 2.7 >2.7  Afebrile  Antibiotics- none  UA neg      ENDO:  #DM (A1C 9 (7/2021))  -hold home Metformin for now  -hyperglycemia, on tight RISS, monitoring FS -- consider endo c/s   -f/u HA1C      LINES/DRAINS:  PIV    DISPO:    SDU     Assessment & Plan   52yo male with PMH DM-II, neuropathy, HTN and substance abuse/ETOH abuse (on Suboxone, uses tobacco), drinks 2 pints of vodka per day, s/p mechanical fall, ETOH level 380, with acute Left sided 6-9th rib fxs, T8 vertebral body fx.    NEURO:  #h/o ETOH/substance abuse  -resume home suboxone  -CIWA protocol, Ativan if necessary for DTs  #T8 vertebral body fx -->no acute neurosurgical intervention, pain control    -Acute pain-controlled with tylenol ATC and Ibuprofen  -continue Gabapentin 300mg q8  -Lidoderm patch    RESP:   # Left sided 6-9th rib fxs  -pain control  -satting well on RA  - IS pulling 1.5-2L  -f/up CXR  -  Activity- OOB     CARDS:   #HTN  - holding home Lisinopril 2/2 borderline BP, restarted this am  -f/up ECG  -->757 > 466 > 347    GI/NUTR:   -Diet: DASH  -GI Prophylaxis- PPI  -Bowel regimen- Senna    /RENAL:        Monitor UO- no regalado   -IVF: banana bag @ 100/hr  -LA 5.6 > 2.2 > 1.9 after IVF resuscitation    Labs:          BUN/Cr- 13/0.5->13/0.6          Electrolytes-Na 133 // K 4.6 // Mg 1.7 //  Phos 3.3    HEME/ONC:       DVT prophylaxis- Lovenox, SCDs    Labs: Hgb 13 > 10.6 > _11/32.1_              Plts:  57  -->,  82  -->   35 > 33__    ID:  WBC- 3.87  --->>,  7.37  --->> > 2.7 >2.7  Afebrile  Antibiotics- none  UA neg      ENDO:  #DM (A1C 9 (7/2021))  -hold home Metformin for now  -hyperglycemia, on tight RISS, monitoring FS -- consider endo c/s   -f/u HA1C      LINES/DRAINS:  PIV    DISPO:    SDU

## 2022-01-09 NOTE — PROGRESS NOTE ADULT - SUBJECTIVE AND OBJECTIVE BOX
TRAUMA SURGERY PROGRESS NOTE    Patient: GINO STEWART , 51y (68)Male   MRN: [id number]  Location: [address]  Visit: 22 Inpatient  Date: 22 @ 05:28    Hospital Day #: 8    Procedure/Dx/Injuries: multiple rib fractures    Events of past 24 hours:  lactate downtrending, no acute events overnight.     PAST MEDICAL & SURGICAL HISTORY:  HTN (hypertension)    DM (diabetes mellitus)    Alcohol dependency    Diabetes    History of incision and drainage        Vitals:   T(F): 98.2 (22 @ 00:00), Max: 98.5 (22 @ 16:00)  HR: 85 (22 @ 00:00)  BP: 125/62 (22 @ 00:00)  RR: 18 (22 @ 00:00)  SpO2: 100% (22 @ 00:00)      Diet, DASH/TLC:   Sodium & Cholesterol Restricted  Consistent Carbohydrate No Snacks      Fluids: sodium chloride 0.9%: 1000 milliLiter(s)      with thiamine additive 100 milliGRAM(s), infuse at 100 mL/Hr  Special Instructions: Additive: Thiamine  sodium chloride 0.9%.: Solution, 1000 milliLiter(s) infuse at 1000 mL/Hr      I & O's:      PHYSICAL EXAM:  General: NAD  HEENT: Normocephalic, atraumatic, EOMI, PEERLA. no scalp lacerations   Neck: Soft, midline trachea. no c-spine tenderness  Chest: chest wall tenderness, no subcutaneous emphysema   Cardiac: S1, S2, RRR  Respiratory: Bilateral breath sounds, clear and equal bilaterally  Abdomen: Soft, non-distended, non-tender, no rebound, no guarding.  Groin: Normal appearing, pelvis stable   Ext:  Moving b/l upper and lower extremities. Palpable Radial b/l UE, b/l DP palpable in LE.         MEDICATIONS  (STANDING):  acetaminophen     Tablet .. 650 milliGRAM(s) Oral every 6 hours  buprenorphine 8 mG/naloxone 2 mG SL  Tablet 1 Tablet(s) SubLingual daily  dextrose 40% Gel 15 Gram(s) Oral once  dextrose 5%. 1000 milliLiter(s) (50 mL/Hr) IV Continuous <Continuous>  dextrose 50% Injectable 25 Gram(s) IV Push once  enoxaparin Injectable 40 milliGRAM(s) SubCutaneous daily  folic acid 1 milliGRAM(s) Oral daily  gabapentin 300 milliGRAM(s) Oral every 8 hours  glucagon  Injectable 1 milliGRAM(s) IntraMuscular once  ibuprofen  Tablet. 600 milliGRAM(s) Oral every 8 hours  influenza   Vaccine 0.5 milliLiter(s) IntraMuscular once  insulin regular  human corrective regimen sliding scale   IV Push Before meals and at bedtime  lidocaine   4% Patch 1 Patch Transdermal every 24 hours  melatonin 1 milliGRAM(s) Oral at bedtime  pantoprazole    Tablet 40 milliGRAM(s) Oral before breakfast  senna 2 Tablet(s) Oral at bedtime  sodium chloride 0.9% 1000 milliLiter(s) IV Continuous   sodium chloride 0.9%. 1000 milliLiter(s) (1000 mL/Hr) IV Continuous <Continuous>  thiamine 100 milliGRAM(s) Oral daily    MEDICATIONS  (PRN):      DVT PROPHYLAXIS: SCDs, enoxaparin Injectable 40 milliGRAM(s) SubCutaneous daily    GI PROPHYLAXIS: pantoprazole    Tablet 40 milliGRAM(s) Oral before breakfast    ANTICOAGULATION:   ANTIBIOTICS:           LAB/STUDIES:  Labs:  CAPILLARY BLOOD GLUCOSE      POCT Blood Glucose.: 201 mg/dL (2022 23:59)  POCT Blood Glucose.: 321 mg/dL (2022 22:37)  POCT Blood Glucose.: 213 mg/dL (2022 16:15)  POCT Blood Glucose.: 286 mg/dL (2022 06:41)                          10.6   2.74  )-----------( 35       ( 2022 01:28 )             31.3       Auto Neutrophil %: 64.8 % (22 @ 07:00)  Auto Immature Granulocyte %: 0.3 % (22 @ 07:00)        133<L>  |  96<L>  |  13  ----------------------------<  176<H>  4.3   |  23  |  0.5<L>      Magnesium, Serum: 1.8 mg/dL (22 @ 01:28)      LFTs:             5.7  | 2.0  | 101      ------------------[88      ( 2022 01:28 )  3.3  | 1.2  | 38          Lipase:x      Amylase:x         Lactate, Blood: 1.9 mmol/L (22 @ 01:28)  Blood Gas Arterial, Lactate: 2.20 mmol/L (22 @ 21:12)  Lactate, Blood: 5.6 mmol/L (22 @ 07:00)  Lactate, Blood: 3.9 mmol/L (22 @ 04:40)  Blood Gas Venous - Lactate: 5.90 mmol/L (22 @ 00:29)    ABG - ( 2022 21:12 )  pH: 7.47  /  pCO2: 39    /  pO2: 74    / HCO3: 28    / Base Excess: 4.5   /  SaO2: 97.2              Coags:     15.20  ----< 1.33    ( 2022 07:00 )     41.1        CARDIAC MARKERS ( 2022 01:28 )  x     / x     / 347 U/L / x     / x      CARDIAC MARKERS ( 2022 16:00 )  x     / x     / 466 U/L / x     / x      CARDIAC MARKERS ( 2022 07:00 )  x     / x     / 757 U/L / x     / x            Alcohol, Blood: 380 mg/dL (22 @ 07:00)    Urinalysis Basic - ( 2022 02:45 )    Color: Yellow / Appearance: Clear / S.035 / pH: x  Gluc: x / Ketone: Trace  / Bili: Negative / Urobili: 3 mg/dL   Blood: x / Protein: Trace / Nitrite: Negative   Leuk Esterase: Negative / RBC: x / WBC x   Sq Epi: x / Non Sq Epi: x / Bacteria: x      Alcohol, Blood: 380 mg/dL (22 @ 07:00)      IMAGING:  < from: Xray Pelvis AP only (22 @ 08:00) >  impression:    Study limited by technique and body habitus. No evidence of acute   displaced fracture. Contrast in the bladder.    < end of copied text >    < from: Xray Chest 1 View AP/PA (22 @ 08:00) >  Impression:    Right basilar atelectatic changes, new.    < end of copied text >    < from: Xray Humerus, Bilateral (22 @ 07:59) >  Impression:  No evidence of acute fracture or dislocation of bilateral humeri.    Left rib fractures better seen on CT.    < end of copied text >    < from: Xray Knee 1 or 2 Views, Bilateral (22 @ 07:59) >  Impression:  No evidence of acute osseous abnormality of bilateral knees.    < end of copied text >    < from: CT Chest w/ IV Cont (22 @ 07:28) >    Addition to  impression: T11-T12 fractured anterior osteophyte.    < end of copied text >  < from: CT Chest w/ IV Cont (22 @ 07:28) >  IMPRESSION:    Multiple left-sided rib fractures.    Fracture through the T8 vertebral body.    Undulation of the hepatic contour may represent early cirrhosis.    Trace nonspecific ascites.    < end of copied text >      ACCESS DEVICES:  [x] Peripheral IV  [ ] Central Venous Line	[ ] R	[ ] L	[ ] IJ	[ ] Fem	[ ] SC	Placed:   [ ] Arterial Line		[ ] R	[ ] L	[ ] Fem	[ ] Rad	[ ] Ax	Placed:   [ ] PICC:					[ ] Mediport  [ ] Urinary Catheter,  Date Placed:   [ ] Chest tube: [ ] Right, [ ] Left  [ ] ESTELLA/Geovanny Drains

## 2022-01-09 NOTE — PROGRESS NOTE ADULT - SUBJECTIVE AND OBJECTIVE BOX
GINO STEWART  [id number]  51y Male    Indication for ICU admission: s/p mechanical trip and fall, intoxicated, with acute Left sided 6-9th Rib fxs, and T8 vertebral body fx    Admit Date:01-08-22  ICU Date: 1/8/22      No Known Allergies    PAST MEDICAL & SURGICAL HISTORY:  HTN (hypertension)    DM (diabetes mellitus)    Alcohol dependency    History of incision and drainage left elbow    inguinal hernia repair        Home Medications:  buprenorphine-naloxone 8 mg-2 mg sublingual tablet:  sublingual  (07 Jan 2022 18:02)  lisinopril:  (07 Jan 2022 18:02)  Metformin 500mg bid  Gabapentin 300mg q8        24HRS EVENT:  1/8  NIGHT:  -no acute events  -lactate downtrending 2.2    Day:  -NSG --> no acute neurosurgical intervention, pain control  -CIWA protocol        DVT PTX: enoxaparin Injectable 40 milliGRAM(s) SubCutaneous daily      GI PTX:pantoprazole    Tablet 40 milliGRAM(s) Oral before breakfast      ***Tubes/Lines/Drains  ***  Peripheral IV      REVIEW OF SYSTEMS    [x ] A ten-point review of systems was otherwise negative except as noted.  [ ] Due to altered mental status/intubation, subjective information were not able to be obtained from the patient. History was obtained, to the extent possible, from review of the chart and collateral sources of information.                   GINO STEWART  [id number]  51y Male    Indication for ICU admission: s/p mechanical trip and fall, intoxicated, with acute Left sided 6-9th Rib fxs, and T8 vertebral body fx    Admit Date:22  ICU Date: 22      No Known Allergies    PAST MEDICAL & SURGICAL HISTORY:  HTN (hypertension)    DM (diabetes mellitus)    Alcohol dependency    History of incision and drainage left elbow    inguinal hernia repair        Home Medications:  buprenorphine-naloxone 8 mg-2 mg sublingual tablet:  sublingual  (2022 18:02)  lisinopril:  (2022 18:02)  Metformin 500mg bid  Gabapentin 300mg q8        24HRS EVENT:    NIGHT:  -no acute events  -lactate downtrending 2.2    Day:  -NSG --> no acute neurosurgical intervention, pain control  -CIWA protocol        DVT PTX: enoxaparin Injectable 40 milliGRAM(s) SubCutaneous daily      GI PTX:pantoprazole    Tablet 40 milliGRAM(s) Oral before breakfast      ***Tubes/Lines/Drains  ***  Peripheral IV      REVIEW OF SYSTEMS    [x ] A ten-point review of systems was otherwise negative except as noted.  [ ] Due to altered mental status/intubation, subjective information were not able to be obtained from the patient. History was obtained, to the extent possible, from review of the chart and collateral sources of information.          Daily Height in cm: 185.42 (2022 16:00)    Daily     CURRENT MEDS:  Neurologic Medications  acetaminophen     Tablet .. 650 milliGRAM(s) Oral every 6 hours  buprenorphine 8 mG/naloxone 2 mG SL  Tablet 1 Tablet(s) SubLingual daily  gabapentin 300 milliGRAM(s) Oral every 8 hours  ibuprofen  Tablet. 600 milliGRAM(s) Oral every 8 hours    Respiratory Medications    Cardiovascular Medications  lisinopril 10 milliGRAM(s) Oral daily    Gastrointestinal Medications  folic acid 1 milliGRAM(s) Oral daily  magnesium sulfate  IVPB 2 Gram(s) IV Intermittent once  pantoprazole    Tablet 40 milliGRAM(s) Oral before breakfast  senna 2 Tablet(s) Oral at bedtime  sodium chloride 0.9% 1000 milliLiter(s) IV Continuous   thiamine 100 milliGRAM(s) Oral daily    Genitourinary Medications    Hematologic/Oncologic Medications  enoxaparin Injectable 40 milliGRAM(s) SubCutaneous daily  influenza   Vaccine 0.5 milliLiter(s) IntraMuscular once    Antimicrobial/Immunologic Medications    Endocrine/Metabolic Medications  glucagon  Injectable 1 milliGRAM(s) IntraMuscular once  insulin regular  human corrective regimen sliding scale   IV Push Before meals and at bedtime    Topical/Other Medications  lidocaine   4% Patch 1 Patch Transdermal every 24 hours      ICU Vital Signs Last 24 Hrs  T(C): 36.7 (2022 04:00), Max: 36.9 (2022 16:00)  T(F): 98 (2022 04:00), Max: 98.5 (2022 16:00)  HR: 63 (2022 04:00) (63 - 88)  BP: 132/65 (2022 04:00) (125/62 - 143/78)  BP(mean): 92 (2022 16:00) (92 - 92)  ABP: --  ABP(mean): --  RR: 18 (2022 04:00) (18 - 20)  SpO2: 100% (2022 04:00) (96% - 100%)        ABG - ( 2022 21:12 )  pH, Arterial: 7.47  pH, Blood: x     /  pCO2: 39    /  pO2: 74    / HCO3: 28    / Base Excess: 4.5   /  SaO2: 97.2            I&O's Detail    2022 07:01  -  2022 07:00  --------------------------------------------------------  IN:    Oral Fluid: 500 mL    Sodium Chloride 0.9% Bolus: 1000 mL    sodium chloride 0.9% w/ Additives: 800 mL  Total IN: 2300 mL    OUT:    Voided (mL): 900 mL  Total OUT: 900 mL    Total NET: 1400 mL        I&O's Summary    2022 07:01  -  2022 07:00  --------------------------------------------------------  IN: 2300 mL / OUT: 900 mL / NET: 1400 mL        LABS:    CAPILLARY BLOOD GLUCOSE      POCT Blood Glucose.: 201 mg/dL (2022 23:59)  POCT Blood Glucose.: 321 mg/dL (2022 22:37)  POCT Blood Glucose.: 213 mg/dL (2022 16:15)                          11.0   2.70  )-----------( 33       ( 2022 06:00 )             32.1             135  |  98  |  13  ----------------------------<  169<H>  4.6   |  23  |  0.6<L>      Calcium, Total Serum: 7.8 mg/dL (22 @ 06:00)      LFTs:             5.7  | 2.0  | 101      ------------------[88      ( 2022 01:28 )  3.3  | 1.2  | 38                 Lactate, Blood: 1.9 mmol/L (22 @ 01:28)  Blood Gas Arterial, Lactate: 2.20 mmol/L (22 @ 21:12)  Lactate, Blood: 5.6 mmol/L (22 @ 07:00)  Lactate, Blood: 3.9 mmol/L (22 @ 04:40)  Blood Gas Venous - Lactate: 5.90 mmol/L (22 @ 00:29)    ABG - ( 2022 21:12 )  pH: 7.47  /  pCO2: 39    /  pO2: 74    / HCO3: 28    / Base Excess: 4.5   /  SaO2: 97.2          Coags:     15.20  ----< 1.33    ( 2022 07:00 )     41.1        CARDIAC MARKERS ( 2022 01:28 )  x     / x     / 347 U/L / x     / x      CARDIAC MARKERS ( 2022 16:00 )  x     / x     / 466 U/L / x     / x      CARDIAC MARKERS ( 2022 07:00 )  x     / x     / 757 U/L / x     / x          Urinalysis Basic - ( 2022 02:45 )    Color: Yellow / Appearance: Clear / S.035 / pH: x  Gluc: x / Ketone: Trace  / Bili: Negative / Urobili: 3 mg/dL   Blood: x / Protein: Trace / Nitrite: Negative   Leuk Esterase: Negative / RBC: x / WBC x   Sq Epi: x / Non Sq Epi: x / Bacteria: x            PHYSICAL EXAM:    General/Neuro        GCS:   15    Deficits:  alert & oriented x 3, no focal deficits  Lungs:  clear to auscultation, Normal expansion/effort.   tenderness over left chest wall area  Cardiovascular : S1, S2.  Regular rate and rhythm.    Cardiac Rhythm: Normal Sinus Rhythm  GI: Abdomen soft, Non-tender, Non-distended.    Extremities: Extremities warm, well-perfused.  No Peripheral edema b/l LE, + erythema b/l LE  Derm: Good skin turgor, no skin breakdown.    : No Oliver catheter.                       GINO STEWART  [id number]  51y Male    Indication for ICU admission: s/p mechanical trip and fall, intoxicated, with acute Left sided 6-9th Rib fxs, and T8 vertebral body fx    Admit Date:22  ICU Date: 22      No Known Allergies    PAST MEDICAL & SURGICAL HISTORY:  HTN (hypertension)    DM (diabetes mellitus)    Alcohol dependency    History of incision and drainage left elbow    inguinal hernia repair        Home Medications:  buprenorphine-naloxone 8 mg-2 mg sublingual tablet:  sublingual  (2022 18:02)  lisinopril:  (2022 18:02)  Metformin 500mg bid  Gabapentin 300mg q8        24HRS EVENT:    NIGHT:  -no acute events  -lactate downtrending 2.2    Day:  -NSG --> no acute neurosurgical intervention, pain control  -CIWA protocol        DVT PTX: enoxaparin Injectable 40 milliGRAM(s) SubCutaneous daily      GI PTX:pantoprazole    Tablet 40 milliGRAM(s) Oral before breakfast      ***Tubes/Lines/Drains  ***  Peripheral IV      REVIEW OF SYSTEMS    [x ] A ten-point review of systems was otherwise negative except as noted.  [ ] Due to altered mental status/intubation, subjective information were not able to be obtained from the patient. History was obtained, to the extent possible, from review of the chart and collateral sources of information.          Daily Height in cm: 185.42 (2022 16:00)    Daily     CURRENT MEDS:  Neurologic Medications  acetaminophen     Tablet .. 650 milliGRAM(s) Oral every 6 hours  buprenorphine 8 mG/naloxone 2 mG SL  Tablet 1 Tablet(s) SubLingual daily  gabapentin 300 milliGRAM(s) Oral every 8 hours  ibuprofen  Tablet. 600 milliGRAM(s) Oral every 8 hours    Respiratory Medications    Cardiovascular Medications  lisinopril 10 milliGRAM(s) Oral daily    Gastrointestinal Medications  folic acid 1 milliGRAM(s) Oral daily  magnesium sulfate  IVPB 2 Gram(s) IV Intermittent once  pantoprazole    Tablet 40 milliGRAM(s) Oral before breakfast  senna 2 Tablet(s) Oral at bedtime  sodium chloride 0.9% 1000 milliLiter(s) IV Continuous   thiamine 100 milliGRAM(s) Oral daily    Genitourinary Medications    Hematologic/Oncologic Medications  enoxaparin Injectable 40 milliGRAM(s) SubCutaneous daily  influenza   Vaccine 0.5 milliLiter(s) IntraMuscular once    Antimicrobial/Immunologic Medications    Endocrine/Metabolic Medications  glucagon  Injectable 1 milliGRAM(s) IntraMuscular once  insulin regular  human corrective regimen sliding scale   IV Push Before meals and at bedtime    Topical/Other Medications  lidocaine   4% Patch 1 Patch Transdermal every 24 hours      ICU Vital Signs Last 24 Hrs  T(C): 36.7 (2022 04:00), Max: 36.9 (2022 16:00)  T(F): 98 (2022 04:00), Max: 98.5 (2022 16:00)  HR: 63 (2022 04:00) (63 - 88)  BP: 132/65 (2022 04:00) (125/62 - 143/78)  BP(mean): 92 (2022 16:00) (92 - 92)  ABP: --  ABP(mean): --  RR: 18 (2022 04:00) (18 - 20)  SpO2: 100% (2022 04:00) (96% - 100%)        ABG - ( 2022 21:12 )  pH, Arterial: 7.47  pH, Blood: x     /  pCO2: 39    /  pO2: 74    / HCO3: 28    / Base Excess: 4.5   /  SaO2: 97.2            I&O's Detail    2022 07:01  -  2022 07:00  --------------------------------------------------------  IN:    Oral Fluid: 500 mL    Sodium Chloride 0.9% Bolus: 1000 mL    sodium chloride 0.9% w/ Additives: 800 mL  Total IN: 2300 mL    OUT:    Voided (mL): 900 mL  Total OUT: 900 mL    Total NET: 1400 mL        I&O's Summary    2022 07:01  -  2022 07:00  --------------------------------------------------------  IN: 2300 mL / OUT: 900 mL / NET: 1400 mL        LABS:    CAPILLARY BLOOD GLUCOSE      POCT Blood Glucose.: 201 mg/dL (2022 23:59)  POCT Blood Glucose.: 321 mg/dL (2022 22:37)  POCT Blood Glucose.: 213 mg/dL (2022 16:15)                          11.0   2.70  )-----------( 33       ( 2022 06:00 )             32.1             135  |  98  |  13  ----------------------------<  169<H>  4.6   |  23  |  0.6<L>      Calcium, Total Serum: 7.8 mg/dL (22 @ 06:00)      LFTs:             5.7  | 2.0  | 101      ------------------[88      ( 2022 01:28 )  3.3  | 1.2  | 38                 Lactate, Blood: 1.9 mmol/L (22 @ 01:28)  Blood Gas Arterial, Lactate: 2.20 mmol/L (22 @ 21:12)  Lactate, Blood: 5.6 mmol/L (22 @ 07:00)  Lactate, Blood: 3.9 mmol/L (22 @ 04:40)  Blood Gas Venous - Lactate: 5.90 mmol/L (22 @ 00:29)    ABG - ( 2022 21:12 )  pH: 7.47  /  pCO2: 39    /  pO2: 74    / HCO3: 28    / Base Excess: 4.5   /  SaO2: 97.2          Coags:     15.20  ----< 1.33    ( 2022 07:00 )     41.1        CARDIAC MARKERS ( 2022 01:28 )  x     / x     / 347 U/L / x     / x      CARDIAC MARKERS ( 2022 16:00 )  x     / x     / 466 U/L / x     / x      CARDIAC MARKERS ( 2022 07:00 )  x     / x     / 757 U/L / x     / x          Urinalysis Basic - ( 2022 02:45 )    Color: Yellow / Appearance: Clear / S.035 / pH: x  Gluc: x / Ketone: Trace  / Bili: Negative / Urobili: 3 mg/dL   Blood: x / Protein: Trace / Nitrite: Negative   Leuk Esterase: Negative / RBC: x / WBC x   Sq Epi: x / Non Sq Epi: x / Bacteria: x            PHYSICAL EXAM:    General/Neuro        GCS:   15    Deficits:  alert & oriented x 3, no focal deficits  Lungs:  clear to auscultation, Normal expansion/effort.   tenderness over left chest wall area  Cardiovascular : S1, S2.  Regular rate and rhythm.    Cardiac Rhythm: Normal Sinus Rhythm  GI: Abdomen soft, Non-tender, Non-distended.    Extremities: Extremities warm, well-perfused.  No Peripheral edema b/l LE, + erythema b/l LE  Derm: Good skin turgor, no skin breakdown.    : No Oliver catheter.

## 2022-01-10 LAB
A1C WITH ESTIMATED AVERAGE GLUCOSE RESULT: 8.3 % — HIGH (ref 4–5.6)
A1C WITH ESTIMATED AVERAGE GLUCOSE RESULT: 8.7 % — HIGH (ref 4–5.6)
ANION GAP SERPL CALC-SCNC: 11 MMOL/L — SIGNIFICANT CHANGE UP (ref 7–14)
BASOPHILS # BLD AUTO: 0.01 K/UL — SIGNIFICANT CHANGE UP (ref 0–0.2)
BASOPHILS NFR BLD AUTO: 0.3 % — SIGNIFICANT CHANGE UP (ref 0–1)
BUN SERPL-MCNC: 14 MG/DL — SIGNIFICANT CHANGE UP (ref 10–20)
CALCIUM SERPL-MCNC: 8 MG/DL — LOW (ref 8.5–10.1)
CHLORIDE SERPL-SCNC: 96 MMOL/L — LOW (ref 98–110)
CO2 SERPL-SCNC: 25 MMOL/L — SIGNIFICANT CHANGE UP (ref 17–32)
CREAT SERPL-MCNC: 0.6 MG/DL — LOW (ref 0.7–1.5)
EOSINOPHIL # BLD AUTO: 0.06 K/UL — SIGNIFICANT CHANGE UP (ref 0–0.7)
EOSINOPHIL NFR BLD AUTO: 2 % — SIGNIFICANT CHANGE UP (ref 0–8)
ESTIMATED AVERAGE GLUCOSE: 192 MG/DL — HIGH (ref 68–114)
ESTIMATED AVERAGE GLUCOSE: 203 MG/DL — HIGH (ref 68–114)
GLUCOSE BLDC GLUCOMTR-MCNC: 162 MG/DL — HIGH (ref 70–99)
GLUCOSE BLDC GLUCOMTR-MCNC: 193 MG/DL — HIGH (ref 70–99)
GLUCOSE BLDC GLUCOMTR-MCNC: 244 MG/DL — HIGH (ref 70–99)
GLUCOSE SERPL-MCNC: 185 MG/DL — HIGH (ref 70–99)
HCT VFR BLD CALC: 31.3 % — LOW (ref 42–52)
HCT VFR BLD CALC: 33.1 % — LOW (ref 42–52)
HGB BLD-MCNC: 10.8 G/DL — LOW (ref 14–18)
HGB BLD-MCNC: 11.1 G/DL — LOW (ref 14–18)
IMM GRANULOCYTES NFR BLD AUTO: 0 % — LOW (ref 0.1–0.3)
LYMPHOCYTES # BLD AUTO: 0.69 K/UL — LOW (ref 1.2–3.4)
LYMPHOCYTES # BLD AUTO: 23.5 % — SIGNIFICANT CHANGE UP (ref 20.5–51.1)
MAGNESIUM SERPL-MCNC: 1.6 MG/DL — LOW (ref 1.8–2.4)
MCHC RBC-ENTMCNC: 32.9 PG — HIGH (ref 27–31)
MCHC RBC-ENTMCNC: 33.3 PG — HIGH (ref 27–31)
MCHC RBC-ENTMCNC: 33.5 G/DL — SIGNIFICANT CHANGE UP (ref 32–37)
MCHC RBC-ENTMCNC: 34.5 G/DL — SIGNIFICANT CHANGE UP (ref 32–37)
MCV RBC AUTO: 96.6 FL — HIGH (ref 80–94)
MCV RBC AUTO: 98.2 FL — HIGH (ref 80–94)
MONOCYTES # BLD AUTO: 0.49 K/UL — SIGNIFICANT CHANGE UP (ref 0.1–0.6)
MONOCYTES NFR BLD AUTO: 16.7 % — HIGH (ref 1.7–9.3)
NEUTROPHILS # BLD AUTO: 1.69 K/UL — SIGNIFICANT CHANGE UP (ref 1.4–6.5)
NEUTROPHILS NFR BLD AUTO: 57.5 % — SIGNIFICANT CHANGE UP (ref 42.2–75.2)
NRBC # BLD: 0 /100 WBCS — SIGNIFICANT CHANGE UP (ref 0–0)
NRBC # BLD: 0 /100 WBCS — SIGNIFICANT CHANGE UP (ref 0–0)
PHOSPHATE SERPL-MCNC: 2.4 MG/DL — SIGNIFICANT CHANGE UP (ref 2.1–4.9)
PLATELET # BLD AUTO: 31 K/UL — LOW (ref 130–400)
PLATELET # BLD AUTO: 34 K/UL — LOW (ref 130–400)
POTASSIUM SERPL-MCNC: 4.5 MMOL/L — SIGNIFICANT CHANGE UP (ref 3.5–5)
POTASSIUM SERPL-SCNC: 4.5 MMOL/L — SIGNIFICANT CHANGE UP (ref 3.5–5)
RBC # BLD: 3.24 M/UL — LOW (ref 4.7–6.1)
RBC # BLD: 3.37 M/UL — LOW (ref 4.7–6.1)
RBC # FLD: 13.2 % — SIGNIFICANT CHANGE UP (ref 11.5–14.5)
RBC # FLD: 13.5 % — SIGNIFICANT CHANGE UP (ref 11.5–14.5)
SODIUM SERPL-SCNC: 132 MMOL/L — LOW (ref 135–146)
WBC # BLD: 2.25 K/UL — LOW (ref 4.8–10.8)
WBC # BLD: 2.94 K/UL — LOW (ref 4.8–10.8)
WBC # FLD AUTO: 2.25 K/UL — LOW (ref 4.8–10.8)
WBC # FLD AUTO: 2.94 K/UL — LOW (ref 4.8–10.8)

## 2022-01-10 PROCEDURE — 99233 SBSQ HOSP IP/OBS HIGH 50: CPT

## 2022-01-10 PROCEDURE — 71045 X-RAY EXAM CHEST 1 VIEW: CPT | Mod: 26

## 2022-01-10 RX ORDER — INSULIN LISPRO 100/ML
8 VIAL (ML) SUBCUTANEOUS
Refills: 0 | Status: DISCONTINUED | OUTPATIENT
Start: 2022-01-10 | End: 2022-01-12

## 2022-01-10 RX ORDER — MAGNESIUM SULFATE 500 MG/ML
2 VIAL (ML) INJECTION ONCE
Refills: 0 | Status: COMPLETED | OUTPATIENT
Start: 2022-01-10 | End: 2022-01-10

## 2022-01-10 RX ORDER — INSULIN GLARGINE 100 [IU]/ML
12 INJECTION, SOLUTION SUBCUTANEOUS EVERY MORNING
Refills: 0 | Status: DISCONTINUED | OUTPATIENT
Start: 2022-01-10 | End: 2022-01-10

## 2022-01-10 RX ORDER — INSULIN GLARGINE 100 [IU]/ML
14 INJECTION, SOLUTION SUBCUTANEOUS EVERY MORNING
Refills: 0 | Status: DISCONTINUED | OUTPATIENT
Start: 2022-01-10 | End: 2022-01-10

## 2022-01-10 RX ORDER — INSULIN LISPRO 100/ML
5 VIAL (ML) SUBCUTANEOUS
Refills: 0 | Status: DISCONTINUED | OUTPATIENT
Start: 2022-01-10 | End: 2022-01-10

## 2022-01-10 RX ORDER — INSULIN HUMAN 100 [IU]/ML
INJECTION, SOLUTION SUBCUTANEOUS
Refills: 0 | Status: DISCONTINUED | OUTPATIENT
Start: 2022-01-10 | End: 2022-01-12

## 2022-01-10 RX ORDER — MULTIVIT-MIN/FERROUS GLUCONATE 9 MG/15 ML
1 LIQUID (ML) ORAL DAILY
Refills: 0 | Status: DISCONTINUED | OUTPATIENT
Start: 2022-01-10 | End: 2022-01-10

## 2022-01-10 RX ORDER — INSULIN GLARGINE 100 [IU]/ML
25 INJECTION, SOLUTION SUBCUTANEOUS EVERY MORNING
Refills: 0 | Status: DISCONTINUED | OUTPATIENT
Start: 2022-01-10 | End: 2022-01-12

## 2022-01-10 RX ADMIN — GABAPENTIN 600 MILLIGRAM(S): 400 CAPSULE ORAL at 06:15

## 2022-01-10 RX ADMIN — BUPRENORPHINE AND NALOXONE 1 TABLET(S): 2; .5 TABLET SUBLINGUAL at 12:37

## 2022-01-10 RX ADMIN — Medication 2 MILLIGRAM(S): at 06:19

## 2022-01-10 RX ADMIN — Medication 975 MILLIGRAM(S): at 08:27

## 2022-01-10 RX ADMIN — Medication 1.5 MILLIGRAM(S): at 11:49

## 2022-01-10 RX ADMIN — Medication 5 UNIT(S): at 12:02

## 2022-01-10 RX ADMIN — Medication 600 MILLIGRAM(S): at 08:27

## 2022-01-10 RX ADMIN — Medication 975 MILLIGRAM(S): at 23:33

## 2022-01-10 RX ADMIN — Medication 1 MILLIGRAM(S): at 11:47

## 2022-01-10 RX ADMIN — Medication 600 MILLIGRAM(S): at 22:40

## 2022-01-10 RX ADMIN — Medication 8 UNIT(S): at 17:25

## 2022-01-10 RX ADMIN — Medication 25 GRAM(S): at 06:16

## 2022-01-10 RX ADMIN — SENNA PLUS 2 TABLET(S): 8.6 TABLET ORAL at 21:38

## 2022-01-10 RX ADMIN — Medication 1.5 MILLIGRAM(S): at 23:33

## 2022-01-10 RX ADMIN — INSULIN HUMAN 5: 100 INJECTION, SOLUTION SUBCUTANEOUS at 08:26

## 2022-01-10 RX ADMIN — Medication 600 MILLIGRAM(S): at 13:03

## 2022-01-10 RX ADMIN — Medication 975 MILLIGRAM(S): at 11:47

## 2022-01-10 RX ADMIN — ENOXAPARIN SODIUM 40 MILLIGRAM(S): 100 INJECTION SUBCUTANEOUS at 11:46

## 2022-01-10 RX ADMIN — Medication 100 MILLIGRAM(S): at 11:47

## 2022-01-10 RX ADMIN — Medication 1.5 MILLIGRAM(S): at 17:40

## 2022-01-10 RX ADMIN — Medication 2 MILLIGRAM(S): at 00:24

## 2022-01-10 RX ADMIN — Medication 600 MILLIGRAM(S): at 14:20

## 2022-01-10 RX ADMIN — Medication 975 MILLIGRAM(S): at 17:41

## 2022-01-10 RX ADMIN — INSULIN HUMAN 9: 100 INJECTION, SOLUTION SUBCUTANEOUS at 17:27

## 2022-01-10 RX ADMIN — Medication 975 MILLIGRAM(S): at 18:11

## 2022-01-10 RX ADMIN — Medication 5 UNIT(S): at 08:26

## 2022-01-10 RX ADMIN — GABAPENTIN 600 MILLIGRAM(S): 400 CAPSULE ORAL at 13:03

## 2022-01-10 RX ADMIN — LISINOPRIL 10 MILLIGRAM(S): 2.5 TABLET ORAL at 06:16

## 2022-01-10 RX ADMIN — GABAPENTIN 600 MILLIGRAM(S): 400 CAPSULE ORAL at 21:51

## 2022-01-10 RX ADMIN — Medication 975 MILLIGRAM(S): at 06:16

## 2022-01-10 RX ADMIN — Medication 600 MILLIGRAM(S): at 06:15

## 2022-01-10 RX ADMIN — Medication 85 MILLIMOLE(S): at 23:34

## 2022-01-10 RX ADMIN — LIDOCAINE 1 PATCH: 4 CREAM TOPICAL at 08:26

## 2022-01-10 RX ADMIN — PANTOPRAZOLE SODIUM 40 MILLIGRAM(S): 20 TABLET, DELAYED RELEASE ORAL at 06:15

## 2022-01-10 RX ADMIN — INSULIN HUMAN 3: 100 INJECTION, SOLUTION SUBCUTANEOUS at 12:02

## 2022-01-10 RX ADMIN — Medication 600 MILLIGRAM(S): at 21:38

## 2022-01-10 RX ADMIN — Medication 975 MILLIGRAM(S): at 00:24

## 2022-01-10 RX ADMIN — INSULIN HUMAN 3: 100 INJECTION, SOLUTION SUBCUTANEOUS at 21:40

## 2022-01-10 RX ADMIN — LIDOCAINE 1 PATCH: 4 CREAM TOPICAL at 17:40

## 2022-01-10 RX ADMIN — INSULIN GLARGINE 12 UNIT(S): 100 INJECTION, SOLUTION SUBCUTANEOUS at 08:26

## 2022-01-10 RX ADMIN — Medication 975 MILLIGRAM(S): at 12:43

## 2022-01-10 NOTE — PHYSICAL THERAPY INITIAL EVALUATION ADULT - GAIT TRAINING, PT EVAL
Improve ambulation no AD independent by D/C. Ascended and descend 12 steps stairs independent by D/C

## 2022-01-10 NOTE — PROGRESS NOTE ADULT - SUBJECTIVE AND OBJECTIVE BOX
GINO STEWART  [id number]  51y Male    Indication for ICU admission: s/p mechanical trip and fall, intoxicated, with acute Left sided 6-9th Rib fxs, and T8 vertebral body fx    Admit Date:01-08-22  ICU Date: 1/8/22      No Known Allergies    PAST MEDICAL & SURGICAL HISTORY:  HTN (hypertension)    DM (diabetes mellitus)    Alcohol dependency    History of incision and drainage left elbow    inguinal hernia repair        Home Medications:  buprenorphine-naloxone 8 mg-2 mg sublingual tablet:  sublingual  (07 Jan 2022 18:02)  lisinopril:  (07 Jan 2022 18:02)  Metformin 500mg bid  Gabapentin 300mg q8        24HRS EVENT:  1/9  Day:  -increased tylenol to 975mg q6  -increased gabapentin to 600mg q8  -started PO Ativan taper as per [facility] protocol  -lisinopril restarted  -, added Lantus 10u and pre-prandial 5u  -endo c/s ordered    Night  - Lantus increased to 12  - 30 Naphos, 2g Mg- f/u 11am repeats        DVT PTX: enoxaparin Injectable 40 milliGRAM(s) SubCutaneous daily      GI PTX:pantoprazole    Tablet 40 milliGRAM(s) Oral before breakfast      ***Tubes/Lines/Drains  ***  Peripheral IV      REVIEW OF SYSTEMS    [x ] A ten-point review of systems was otherwise negative except as noted.  [ ] Due to altered mental status/intubation, subjective information were not able to be obtained from the patient. History was obtained, to the extent possible, from review of the chart and collateral sources of information.                   GINO STEWART  [id number]  51y Male    Indication for ICU admission: s/p mechanical trip and fall, intoxicated, with acute Left sided 6-9th Rib fxs, and T8 vertebral body fx    Admit Date:01-08-22  ICU Date: 1/8/22      No Known Allergies    PAST MEDICAL & SURGICAL HISTORY:  HTN (hypertension)    DM (diabetes mellitus)    Alcohol dependency    History of incision and drainage left elbow    inguinal hernia repair        Home Medications:  buprenorphine-naloxone 8 mg-2 mg sublingual tablet:  sublingual  (07 Jan 2022 18:02)  lisinopril:  (07 Jan 2022 18:02)  Metformin 500mg bid  Gabapentin 300mg q8        24HRS EVENT:  1/9  Day:  -increased tylenol to 975mg q6  -increased gabapentin to 600mg q8  -started PO Ativan taper as per [facility] protocol  -lisinopril restarted  -, added Lantus 10u and pre-prandial 5u  -endo c/s ordered    Night  - Lantus increased to 12  - 30 Naphos, 2g Mg- f/u 11am repeats        DVT PTX: enoxaparin Injectable 40 milliGRAM(s) SubCutaneous daily      GI PTX:pantoprazole    Tablet 40 milliGRAM(s) Oral before breakfast      ***Tubes/Lines/Drains  ***  Peripheral IV      REVIEW OF SYSTEMS    [x ] A ten-point review of systems was otherwise negative except as noted.  [ ] Due to altered mental status/intubation, subjective information were not able to be obtained from the patient. History was obtained, to the extent possible, from review of the chart and collateral sources of information.            Daily     Daily     Diet, DASH/TLC:   Sodium & Cholesterol Restricted  Consistent Carbohydrate No Snacks (01-08-22 @ 14:19)      CURRENT MEDS:  Neurologic Medications  acetaminophen     Tablet .. 975 milliGRAM(s) Oral every 6 hours  buprenorphine 8 mG/naloxone 2 mG SL  Tablet 1 Tablet(s) SubLingual daily  gabapentin 600 milliGRAM(s) Oral every 8 hours  ibuprofen  Tablet. 600 milliGRAM(s) Oral every 8 hours  LORazepam     Tablet 1.5 milliGRAM(s) Oral every 6 hours  LORazepam     Tablet   Oral     Respiratory Medications    Cardiovascular Medications  lisinopril 10 milliGRAM(s) Oral daily    Gastrointestinal Medications  folic acid 1 milliGRAM(s) Oral daily  pantoprazole    Tablet 40 milliGRAM(s) Oral before breakfast  senna 2 Tablet(s) Oral at bedtime  sodium phosphate IVPB 30 milliMole(s) IV Intermittent once  thiamine 100 milliGRAM(s) Oral daily    Genitourinary Medications    Hematologic/Oncologic Medications  enoxaparin Injectable 40 milliGRAM(s) SubCutaneous daily  influenza   Vaccine 0.5 milliLiter(s) IntraMuscular once    Antimicrobial/Immunologic Medications    Endocrine/Metabolic Medications  glucagon  Injectable 1 milliGRAM(s) IntraMuscular once  insulin glargine Injectable (LANTUS) 12 Unit(s) SubCutaneous every morning  insulin lispro Injectable (ADMELOG) 5 Unit(s) SubCutaneous three times a day before meals  insulin regular  human corrective regimen sliding scale   IV Push Before meals and at bedtime    Topical/Other Medications  lidocaine   4% Patch 1 Patch Transdermal every 24 hours      ICU Vital Signs Last 24 Hrs  T(C): 36.8 (10 Kartik 2022 00:00), Max: 36.9 (09 Jan 2022 12:00)  T(F): 98.2 (10 Kartik 2022 00:00), Max: 98.4 (09 Jan 2022 12:00)  HR: 73 (10 Kartik 2022 04:00) (63 - 85)  BP: 140/69 (10 Kartik 2022 04:00) (128/83 - 170/92)  BP(mean): 97 (10 Kartik 2022 04:00) (90 - 123)  ABP: --  ABP(mean): --  RR: 18 (10 Kartik 2022 04:00) (18 - 18)  SpO2: 95% (10 Kartik 2022 04:00) (95% - 100%)      Adult Advanced Hemodynamics Last 24 Hrs  CVP(mm Hg): --  CVP(cm H2O): --  CO: --  CI: --  PA: --  PA(mean): --  PCWP: --  SVR: --  SVRI: --  PVR: --  PVRI: --      ABG - ( 08 Jan 2022 21:12 )  pH, Arterial: 7.47  pH, Blood: x     /  pCO2: 39    /  pO2: 74    / HCO3: 28    / Base Excess: 4.5   /  SaO2: 97.2                I&O's Summary    09 Jan 2022 07:01  -  10 Kartik 2022 07:00  --------------------------------------------------------  IN: 250 mL / OUT: 0 mL / NET: 250 mL      I&O's Detail    09 Jan 2022 07:01  -  10 Kartik 2022 07:00  --------------------------------------------------------  IN:    IV PiggyBack: 50 mL    Oral Fluid: 200 mL  Total IN: 250 mL    OUT:  Total OUT: 0 mL    Total NET: 250 mL          PHYSICAL EXAM:     a&ox3  follows commands, PALOMO  no acute distress  equal chest rise b/l  abdomen soft  extremities soft

## 2022-01-10 NOTE — PROGRESS NOTE ADULT - ASSESSMENT
ASSESSMENT:  49M PMHx  HTN, DM Type 2, Suboxone use, alcohol abuse, alcohol withdrawal  s/p mechanical trip and fall, intoxicated, with acute Left sided 6-9th Rib fxs, and T8 vertebral body fx    PLAN:   -SDU managment  for close monitoring for EtOH withdrawl  -multimodal pain control. continue home suboxone  -aggressive pulmonary toilet, IS, OOB ad mary  -CIWA protocol  -SCDs & lovenox for DVT prophylaxis  -CATCH team to assess discharge     Lines/Tubes: PIV    TRAUMA SPECTRA: [id number]

## 2022-01-10 NOTE — CHART NOTE - NSCHARTNOTEFT_GEN_A_CORE
SICU Transfer Note:    Transfer from: SICU  Transfer to:  (X) Surgery    (  ) Telemetry    (  ) Medicine    (  ) Palliative    (  ) Stroke Unit    (  ) _______________      SICU COURSE:  50yo male with PMH DM-II, neuropathy, HTN, and substance abuse/ETOH abuse (on Suboxone, uses tobacco), drinks 2 pints of vodka per day, recent ER visit on 1/2/22 for "found down" with no traumatic injuries, also admission in 8/2021 for b/l LE cellulitis --> tx with IV Abx, where he was treated for alcohol withdrawal at the time and completed course of ativan taper.  Pt presented 1/8 to the ER, s/p mechanical fall, reports taking Ambien prior night and drinking vodka, ETOH level 380 on admit.  Pt was found to have acute Left sided 6-9th rib fxs, and T8 vertebral body fx.  SICU/SDU called for close respiratory, hemodynamic monitoring, pain control, and CIWA protocol for possible DTs.     Patient is hemodynamically stable and SpO2 >95% on room air. Pt is cleared to be downgraded.          PAST MEDICAL & SURGICAL HISTORY:  HTN (hypertension)    DM (diabetes mellitus)    Alcohol dependency    Diabetes    History of incision and drainage      Allergies  No Known Allergies      MEDICATIONS  (STANDING):  acetaminophen     Tablet .. 975 milliGRAM(s) Oral every 6 hours  buprenorphine 8 mG/naloxone 2 mG SL  Tablet 1 Tablet(s) SubLingual daily  enoxaparin Injectable 40 milliGRAM(s) SubCutaneous daily  folic acid 1 milliGRAM(s) Oral daily  gabapentin 600 milliGRAM(s) Oral every 8 hours  ibuprofen  Tablet. 600 milliGRAM(s) Oral every 8 hours  influenza   Vaccine 0.5 milliLiter(s) IntraMuscular once  insulin glargine Injectable (LANTUS) 14 Unit(s) SubCutaneous every morning  insulin lispro Injectable (ADMELOG) 5 Unit(s) SubCutaneous three times a day before meals  insulin regular  human corrective regimen sliding scale   SubCutaneous Before meals and at bedtime  lidocaine   4% Patch 1 Patch Transdermal every 24 hours  lisinopril 10 milliGRAM(s) Oral daily  LORazepam     Tablet 1.5 milliGRAM(s) Oral every 6 hours  LORazepam     Tablet   Oral   pantoprazole    Tablet 40 milliGRAM(s) Oral before breakfast  senna 2 Tablet(s) Oral at bedtime  sodium phosphate IVPB 30 milliMole(s) IV Intermittent once  thiamine 100 milliGRAM(s) Oral daily        Vital Signs Last 24 Hrs  T(C): 36.8 (10 Kartik 2022 00:00), Max: 36.8 (09 Jan 2022 16:00)  T(F): 98.2 (10 Kartik 2022 00:00), Max: 98.3 (09 Jan 2022 16:00)  HR: 93 (10 Kartik 2022 08:00) (73 - 93)  BP: 159/83 (10 Kartik 2022 08:00) (128/83 - 160/90)  BP(mean): 115 (10 Kartik 2022 08:00) (97 - 115)  RR: 17 (10 Kartik 2022 08:00) (17 - 18)  SpO2: 96% (10 Kartik 2022 08:00) (95% - 96%)  I&O's Summary    09 Jan 2022 07:01  -  10 Kartik 2022 07:00  --------------------------------------------------------  IN: 250 mL / OUT: 0 mL / NET: 250 mL    10 Kartik 2022 07:01  -  10 Kartik 2022 12:11  --------------------------------------------------------  IN: 0 mL / OUT: 1000 mL / NET: -1000 mL        LABS                                            11.1                  Neurophils% (auto):   x      (01-10 @ 02:08):    2.25 )-----------(31           Lymphocytes% (auto):  x                                             33.1                   Eosinphils% (auto):   x        Manual%: Neutrophils x    ; Lymphocytes x    ; Eosinophils x    ; Bands%: x    ; Blasts x                                    132    |  96     |  14                  Calcium: 8.0   / iCa: x      (01-10 @ 02:08)    ----------------------------<  185       Magnesium: 1.6                              4.5     |  25     |  0.6              Phosphorous: 2.4              Assessment & Plan  50yo male with PMH DM-II, neuropathy, HTN and substance abuse/ETOH abuse (on Suboxone, uses tobacco), drinks 2 pints of vodka per day, s/p mechanical fall, ETOH level 380, with acute Left sided 6-9th rib fxs, T8 vertebral body fx.    NEURO:  #h/o ETOH/substance abuse  -resume home suboxone  -CIWA protocol, started PO Ativan taper for high risk  DTs  #T8 vertebral body fx -->no acute neurosurgical intervention, pain control    -Acute pain-controlled -increased tylenol to 975mg ATC and Ibuprofen  -increased Gabapentin 600mg q8  -Lidoderm patch    RESP:   # Left sided 6-9th rib fxs  -pain control  -satting well on RA  -IS 1.5-2L  -f/up AM CXR  -  Activity- OOB     CARDS:   #HTN  - restarted home Lisinopril   - EKG: NSR, Low voltage QRS,   -->757 > 466 > 347    GI/NUTR:   -Diet: DASH  -GI Prophylaxis- PPI  -Bowel regimen- Senna    /RENAL:   - Monitor UO- no regalado, voiding  -IVF: none; completed banana bag x 1  -LA 5.6 > 2.2 > 1.9 after IVF resuscitation    Monitor UO-regalado in place    Labs:          BUN/Cr- 15/0.5  -->,  14/0.6  -->          Electrolytes-Na 132 // K 4.5 // Mg 1.6 //  Phos 2.4 (01-10 @ 02:08)  - mag and phos repleted overnight      HEME/ONC:       DVT prophylaxis- Lovenox, SCDs    Labs: Hb/Hct:  11.0/32.1  -->,  11.1/33.1  -->                      Plts:  33  -->,  31  -->                 PTT/INR:      - heme/onc consult ordered for thrombocytopenia; pending      ID:  WBC- 2.74  --->>,  2.70  --->>,  2.25  --->>  Temp trend- 24hrs T(F): 98.2 (01-10 @ 00:00), Max: 98.3 (01-09 @ 16:00)  Afebrile  Antibiotics- none  UA neg      ENDO:  #DM (A1C 9 (7/2021)), uncontrolled  -hold home Metformin for now  -Lantus 14 and preprandial 5u Lispro started today  - con't RISS  -endo c/s ordered  -f/u HA1C      LINES/DRAINS:  PIV    DISPO:  Downgrade. Discussed with and approved by Dr. Underwood.        For Follow-Up:  - heme/onc consult  - SICU Transfer Note:    Transfer from: SICU  Transfer to:  (X) Surgery    (  ) Telemetry    (  ) Medicine    (  ) Palliative    (  ) Stroke Unit    (  ) _______________      SICU COURSE:  52yo male with PMH DM-II, neuropathy, HTN, and substance abuse/ETOH abuse (on Suboxone, uses tobacco), drinks 2 pints of vodka per day, recent ER visit on 1/2/22 for "found down" with no traumatic injuries, also admission in 8/2021 for b/l LE cellulitis --> tx with IV Abx, where he was treated for alcohol withdrawal at the time and completed course of ativan taper.  Pt presented 1/8 to the ER, s/p mechanical fall, reports taking Ambien prior night and drinking vodka, ETOH level 380 on admit.  Pt was found to have acute Left sided 6-9th rib fxs, and T8 vertebral body fx.  SICU/SDU called for close respiratory, hemodynamic monitoring, pain control, and CIWA protocol for possible DTs.     Patient is hemodynamically stable and SpO2 >95% on room air. Pt is cleared to be downgraded.          PAST MEDICAL & SURGICAL HISTORY:  HTN (hypertension)    DM (diabetes mellitus)    Alcohol dependency    Diabetes    History of incision and drainage      Allergies  No Known Allergies      MEDICATIONS  (STANDING):  acetaminophen     Tablet .. 975 milliGRAM(s) Oral every 6 hours  buprenorphine 8 mG/naloxone 2 mG SL  Tablet 1 Tablet(s) SubLingual daily  enoxaparin Injectable 40 milliGRAM(s) SubCutaneous daily  folic acid 1 milliGRAM(s) Oral daily  gabapentin 600 milliGRAM(s) Oral every 8 hours  ibuprofen  Tablet. 600 milliGRAM(s) Oral every 8 hours  influenza   Vaccine 0.5 milliLiter(s) IntraMuscular once  insulin glargine Injectable (LANTUS) 14 Unit(s) SubCutaneous every morning  insulin lispro Injectable (ADMELOG) 5 Unit(s) SubCutaneous three times a day before meals  insulin regular  human corrective regimen sliding scale   SubCutaneous Before meals and at bedtime  lidocaine   4% Patch 1 Patch Transdermal every 24 hours  lisinopril 10 milliGRAM(s) Oral daily  LORazepam     Tablet 1.5 milliGRAM(s) Oral every 6 hours  LORazepam     Tablet   Oral   pantoprazole    Tablet 40 milliGRAM(s) Oral before breakfast  senna 2 Tablet(s) Oral at bedtime  sodium phosphate IVPB 30 milliMole(s) IV Intermittent once  thiamine 100 milliGRAM(s) Oral daily        Vital Signs Last 24 Hrs  T(C): 36.8 (10 Kartik 2022 00:00), Max: 36.8 (09 Jan 2022 16:00)  T(F): 98.2 (10 Kartik 2022 00:00), Max: 98.3 (09 Jan 2022 16:00)  HR: 93 (10 Kartik 2022 08:00) (73 - 93)  BP: 159/83 (10 Kartik 2022 08:00) (128/83 - 160/90)  BP(mean): 115 (10 Kartik 2022 08:00) (97 - 115)  RR: 17 (10 Kartik 2022 08:00) (17 - 18)  SpO2: 96% (10 Kartik 2022 08:00) (95% - 96%)  I&O's Summary    09 Jan 2022 07:01  -  10 Kartik 2022 07:00  --------------------------------------------------------  IN: 250 mL / OUT: 0 mL / NET: 250 mL    10 Kartik 2022 07:01  -  10 Kartik 2022 12:11  --------------------------------------------------------  IN: 0 mL / OUT: 1000 mL / NET: -1000 mL        LABS                                            11.1                  Neurophils% (auto):   x      (01-10 @ 02:08):    2.25 )-----------(31           Lymphocytes% (auto):  x                                             33.1                   Eosinphils% (auto):   x        Manual%: Neutrophils x    ; Lymphocytes x    ; Eosinophils x    ; Bands%: x    ; Blasts x                                    132    |  96     |  14                  Calcium: 8.0   / iCa: x      (01-10 @ 02:08)    ----------------------------<  185       Magnesium: 1.6                              4.5     |  25     |  0.6              Phosphorous: 2.4              Assessment & Plan  52yo male with PMH DM-II, neuropathy, HTN and substance abuse/ETOH abuse (on Suboxone, uses tobacco), drinks 2 pints of vodka per day, s/p mechanical fall, ETOH level 380, with acute Left sided 6-9th rib fxs, T8 vertebral body fx.    NEURO:  #h/o ETOH/substance abuse  -resume home suboxone  -CIWA protocol, started PO Ativan taper for high risk  DTs  #T8 vertebral body fx -->no acute neurosurgical intervention, pain control    -Acute pain-controlled -increased tylenol to 975mg ATC and Ibuprofen  -increased Gabapentin 600mg q8  -Lidoderm patch    RESP:   # Left sided 6-9th rib fxs  -pain control  -satting well on RA  -IS 1.5-2L  -f/up AM CXR  -  Activity- OOB     CARDS:   #HTN  - restarted home Lisinopril   - EKG: NSR, Low voltage QRS,   -->757 > 466 > 347    GI/NUTR:   -Diet: DASH  -GI Prophylaxis- PPI  -Bowel regimen- Senna    /RENAL:   - Monitor UO- no regalado, voiding  -IVF: none; completed banana bag x 1  -LA 5.6 > 2.2 > 1.9 after IVF resuscitation    Monitor UO-regalado in place    Labs:          BUN/Cr- 15/0.5  -->,  14/0.6  -->          Electrolytes-Na 132 // K 4.5 // Mg 1.6 //  Phos 2.4 (01-10 @ 02:08)  - mag and phos repleted overnight      HEME/ONC:       DVT prophylaxis- Lovenox, SCDs    Labs: Hb/Hct:  11.0/32.1  -->,  11.1/33.1  -->                      Plts:  33  -->,  31  -->                 PTT/INR:      - heme/onc consult ordered for thrombocytopenia; pending      ID:  WBC- 2.74  --->>,  2.70  --->>,  2.25  --->>  Temp trend- 24hrs T(F): 98.2 (01-10 @ 00:00), Max: 98.3 (01-09 @ 16:00)  Afebrile  Antibiotics- none  UA neg      ENDO:  #DM (A1C 9 (7/2021)), uncontrolled  -hold home Metformin for now  -Lantus 14 and preprandial 5u Lispro started today --> Lantus inc. to 25 units and Admelog inc to 8 units, as per endocrinology  - con't RISS  -endo c/s ordered  -f/u HA1C      ENDO CONSULT (1/10):  # DM II  - A1C: 8.3 on 1/10/22  - Currently on Lantus 14u, Admelog 5u TID premeal and sliding scale.   - Daily insulin requirement calculated on 1/9/22: 51  - increase Lantus to 25u and Admelog to 8    LINES/DRAINS:  PIV    DISPO:  Downgrade. Discussed with and approved by Dr. Underwood.        For Follow-Up:  - heme/onc consult

## 2022-01-10 NOTE — PROGRESS NOTE ADULT - ASSESSMENT
Assessment & Plan   50yo male with PMH DM-II, neuropathy, HTN and substance abuse/ETOH abuse (on Suboxone, uses tobacco), drinks 2 pints of vodka per day, s/p mechanical fall, ETOH level 380, with acute Left sided 6-9th rib fxs, T8 vertebral body fx.    NEURO:  #h/o ETOH/substance abuse  -resume home suboxone  -CIWA protocol, started PO Ativan taper for high risk  DTs  #T8 vertebral body fx -->no acute neurosurgical intervention, pain control    -Acute pain-controlled -increased tylenol to 975mg ATC and Ibuprofen  -increased Gabapentin 600mg q8  -Lidoderm patch    RESP:   # Left sided 6-9th rib fxs  -pain control  -satting well on RA  -IS 1.5-2L  -f/up AM CXR  -  Activity- OOB     CARDS:   #HTN  - restarted home Lisinopril   - EKG: NSR, Low voltage QRS,   -->757 > 466 > 347    GI/NUTR:   -Diet: DASH  -GI Prophylaxis- PPI  -Bowel regimen- Senna    /RENAL:   - Monitor UO- no regalado, voiding  -IVF: none; completed banana bag x 1  -LA 5.6 > 2.2 > 1.9 after IVF resuscitation    Labs:          BUN/Cr- 13/0.6  -->,  15/0.5  -->,  14/0.6  -->          Electrolytes-Na 132 // K 4.5 // Mg 1.6 //  Phos 2.4 (01-10 @ 02:08)      HEME/ONC:       DVT prophylaxis- Lovenox, SCDs    Labs: Hb/Hct:  10.6/31.3  -->,  11.0/32.1  -->,  11.1/33.1  -->  	Plts:  35  -->,  33  -->,  31  -->        ID:  WBC- 2.74  --->>,  2.70  --->>,  2.25  --->>  Afebrile  Antibiotics- none  UA neg      ENDO:  #DM (A1C 9 (7/2021)), uncontrolled  -hold home Metformin for now  -Lantus 12 and preprandial 5u Lipro strted today  - con't RISS  --endo c/s ordered  -f/u HA1C      LINES/DRAINS:  PIV    DISPO:    SDU     Assessment & Plan   52yo male with PMH DM-II, neuropathy, HTN and substance abuse/ETOH abuse (on Suboxone, uses tobacco), drinks 2 pints of vodka per day, s/p mechanical fall, ETOH level 380, with acute Left sided 6-9th rib fxs, T8 vertebral body fx.    NEURO:  #h/o ETOH/substance abuse  -resumed home suboxone  -CIWA protocol, started PO Ativan taper for high risk  DTs  #T8 vertebral body fx -->no acute neurosurgical intervention, pain control    -Acute pain-controlled -increased tylenol to 975mg ATC and Ibuprofen  -increased Gabapentin 600mg q8  -Lidoderm patch    RESP:   # Left sided 6-9th rib fxs  -pain control  -satting well on RA  -IS 1.5-2L  -f/up AM CXR  -  Activity- OOB     CARDS:   #HTN  - restarted home Lisinopril   - EKG: NSR, Low voltage QRS,   -->757 > 466 > 347    GI/NUTR:   -Diet: DASH  -GI Prophylaxis- PPI  -Bowel regimen- Senna    /RENAL:   - Monitor UO- no regalado, voiding  -IVF: none; completed banana bag x 1  -LA 5.6 > 2.2 > 1.9 after IVF resuscitation  Electrolytes-Na 132 // K 4.5 // Mg 1.6 //  Phos 2.4 (01-10 @ 02:08)        HEME/ONC:      DVT prophylaxis-enoxaparin Injectable, SCDs    Labs: Hb/Hct:  11.0/32.1  -->,  11.1/33.1  -->                      Plts:  33  -->,  31  -->                 ID:  WBC- 2.74  --->>,  2.70  --->>,  2.25  --->>  Temp trend- 24hrs T(F): 98.2 (01-10 @ 00:00), Max: 98.4 (01-09 @ 12:00)  Antibiotics-influenza   Vaccine 0.5 once         ENDO:  #DM (A1C 9 (7/2021)), uncontrolled  -hold home Metformin for now  -Lantus 12 and preprandial 5u Lipro strted today  - con't RISS, f/u FSG  --endo c/s ordered  -f/u HA1C      LINES/DRAINS:  PIV    DISPO:    SDU     Assessment & Plan   52yo male with PMH DM-II, neuropathy, HTN and substance abuse/ETOH abuse (on Suboxone, uses tobacco), drinks 2 pints of vodka per day, s/p mechanical fall, ETOH level 380, with acute Left sided 6-9th rib fxs, T8 vertebral body fx.    NEURO:  #h/o ETOH/substance abuse  -resumed home suboxone  -CIWA protocol, started PO Ativan taper started 1/9 PM for high risk  DTs  #T8 vertebral body fx -->no acute neurosurgical intervention, pain control    -Acute pain-controlled -increased tylenol to 975mg ATC and Ibuprofen  -increased Gabapentin 600mg q8  -Lidoderm patch    RESP:   # Left sided 6-9th rib fxs  -pain control  -satting well on RA  -IS 1.5-2L  -f/up AM CXR  -  Activity- OOB     CARDS:   #HTN  - restarted home Lisinopril   - EKG: NSR, Low voltage QRS,   -->757 > 466 > 347    GI/NUTR:   -Diet: DASH  -GI Prophylaxis- PPI  -Bowel regimen- Senna    /RENAL:   - Monitor UO- no regalado, voiding  -IVF: none; completed banana bag x 1  -LA 5.6 > 2.2 > 1.9 after IVF resuscitation  Electrolytes-Na 132 // K 4.5 // Mg 1.6 //  Phos 2.4 (01-10 @ 02:08)        HEME/ONC:      DVT prophylaxis-enoxaparin Injectable, SCDs    Labs: Hb/Hct:  11.0/32.1  -->,  11.1/33.1  -->                      Plts:  33  -->,  31  -->                 ID:  WBC- 2.74  --->>,  2.70  --->>,  2.25  --->>  Temp trend- 24hrs T(F): 98.2 (01-10 @ 00:00), Max: 98.4 (01-09 @ 12:00)  Antibiotics-influenza   Vaccine 0.5 once         ENDO:  #DM (A1C 9 (7/2021)), uncontrolled  -hold home Metformin for now  -Lantus 12 and preprandial 5u Lipro strted today  - con't RISS, f/u FSG  --endo c/s ordered  -f/u HA1C      LINES/DRAINS:  PIV    DISPO:    SLIME

## 2022-01-10 NOTE — CONSULT NOTE ADULT - SUBJECTIVE AND OBJECTIVE BOX
HPI:  TRAUMA ACTIVATION LEVEL:  trauma level 2 transfer from [facility]    MECHANISM OF INJURY:   mechanical fall    GCS: 	E: 4	V: 5	M: 6    HPI: Pt is a 50 yo M with a hx of DM, prior opioid abuse on suboxone, & active alcohol abuse who presents as a trauma transfer from [facility] for multiple rib fractures. Pt reports he was seen in the ER yesterday at the request of his sister due to alcohol abuse; he was discharged home but continued to drink and had a trip and fall last night for which his sister reportedly called an ambulance. The pt states he remembers falling but doesn't remember how he got to the ER. Complaining of right sided chest pain and a headache. Denies fevers, chills, shortness of breath, nausea, vomiting, abdominal pain, changes in bowel habits, or dysuria. Pt endorses daily consumption of 2 pints of vodka as well as several beers. Denies current drug use, auditory or visual hallucinations. Trauma w/u showed  acute Left sided 6-9th Rib fxs, and T8 vertebral body fx          PAST MEDICAL & SURGICAL HISTORY:  HTN (hypertension)    DM (diabetes mellitus)    Alcohol dependency    Diabetes    History of incision and drainage        Hospital Course:    TODAY'S SUBJECTIVE & REVIEW OF SYMPTOMS:     Constitutional WNL   Cardio WNL   Resp WNL   GI WNL  Heme WNL  Endo WNL  Skin WNL  MSK pain  Neuro WNL  Cognitive WNL  Psych WNL      MEDICATIONS  (STANDING):  acetaminophen     Tablet .. 975 milliGRAM(s) Oral every 6 hours  buprenorphine 8 mG/naloxone 2 mG SL  Tablet 1 Tablet(s) SubLingual daily  enoxaparin Injectable 40 milliGRAM(s) SubCutaneous daily  folic acid 1 milliGRAM(s) Oral daily  gabapentin 600 milliGRAM(s) Oral every 8 hours  glucagon  Injectable 1 milliGRAM(s) IntraMuscular once  ibuprofen  Tablet. 600 milliGRAM(s) Oral every 8 hours  influenza   Vaccine 0.5 milliLiter(s) IntraMuscular once  insulin glargine Injectable (LANTUS) 12 Unit(s) SubCutaneous every morning  insulin lispro Injectable (ADMELOG) 5 Unit(s) SubCutaneous three times a day before meals  insulin regular  human corrective regimen sliding scale   IV Push Before meals and at bedtime  lidocaine   4% Patch 1 Patch Transdermal every 24 hours  lisinopril 10 milliGRAM(s) Oral daily  LORazepam     Tablet 1.5 milliGRAM(s) Oral every 6 hours  LORazepam     Tablet   Oral   pantoprazole    Tablet 40 milliGRAM(s) Oral before breakfast  senna 2 Tablet(s) Oral at bedtime  sodium phosphate IVPB 30 milliMole(s) IV Intermittent once  thiamine 100 milliGRAM(s) Oral daily    MEDICATIONS  (PRN):      FAMILY HISTORY:  FH: alcohol abuse  dad        Allergies    No Known Allergies    Intolerances        SOCIAL HISTORY:    [  ] Etoh  [  ] Smoking  [  ] Substance abuse     Home Environment:  [   ] Home Alone  [ x  ] Lives with Family  [   ] Home Health Aid    Dwelling:  [   ] Apartment  [ x  ] Private House  [   ] Adult Home  [   ] Skilled Nursing Facility      [   ] Short Term  [   ] Long Term  [ x  ] Stairs       Elevator [   ]    FUNCTIONAL STATUS PTA: (Check all that apply)  Ambulation: [ x   ]Independent    [   ] Dependent     [   ] Non-Ambulatory  Assistive Device: [   ] SA Cane  [   ]  Q Cane  [   ] Walker  [   ]  Wheelchair  ADL : [ x  ] Independent  [    ]  Dependent       Vital Signs Last 24 Hrs  T(C): 36.8 (10 Kartik 2022 00:00), Max: 36.9 (2022 12:00)  T(F): 98.2 (10 Kartik 2022 00:00), Max: 98.4 (2022 12:00)  HR: 93 (10 Kartik 2022 08:00) (63 - 93)  BP: 159/83 (10 Kartik 2022 08:00) (128/83 - 160/90)  BP(mean): 115 (10 Kartik 2022 08:00) (97 - 115)  RR: 17 (10 Kartik 2022 08:00) (17 - 18)  SpO2: 96% (10 Kartik 2022 08:00) (95% - 96%)      PHYSICAL EXAM: Awake & Alert  GENERAL: NAD  HEAD:  Normocephalic  CHEST/LUNG: Clear   HEART: S1S2+  ABDOMEN: Soft, Nontender  EXTREMITIES:  no calf tenderness    NERVOUS SYSTEM:  Cranial Nerves 2-12 intact [   ] Abnormal  [   ]  ROM: WFL all extremities [ x  ]  Abnormal [   ]  Motor Strength: WFL all extremities  [  x ]  Abnormal [   ]  Sensation: intact to light touch [ x  ] Abnormal [   ]    FUNCTIONAL STATUS:  Bed Mobility: Independent [   ]  Supervision [   ]  Needs Assistance [ x  ]  N/A [   ]  Transfers: Independent [   ]  Supervision [   ]  Needs Assistance [ x  ]  N/A [   ]   Ambulation: Independent [   ]  Supervision [   ]  Needs Assistance [   ]  N/A [   ]  ADL: Independent [   ] Requires Assistance [   ] N/A [   ]      LABS:                        11.1   2.25  )-----------( 31       ( 10 Kartik 2022 02:08 )             33.1     01-10    132<L>  |  96<L>  |  14  ----------------------------<  185<H>  4.5   |  25  |  0.6<L>    Ca    8.0<L>      10 Kartik 2022 02:08  Phos  2.4     01-10  Mg     1.6     01-10    TPro  5.7<L>  /  Alb  3.3<L>  /  TBili  2.0<H>  /  DBili  1.2<H>  /  AST  101<H>  /  ALT  38  /  AlkPhos  88        Urinalysis Basic - ( 2022 02:45 )    Color: Yellow / Appearance: Clear / S.035 / pH: x  Gluc: x / Ketone: Trace  / Bili: Negative / Urobili: 3 mg/dL   Blood: x / Protein: Trace / Nitrite: Negative   Leuk Esterase: Negative / RBC: x / WBC x   Sq Epi: x / Non Sq Epi: x / Bacteria: x        RADIOLOGY & ADDITIONAL STUDIES:

## 2022-01-10 NOTE — PROGRESS NOTE ADULT - SUBJECTIVE AND OBJECTIVE BOX
TRAUMA SURGERY PROGRESS NOTE    Patient: GINO STEWART , 51y (68)Male   MRN: [id number]  Location: [address] [address]   Visit: 22 Inpatient  Date: 01-10-22 @ 10:33    Hospital Day #: 3  Post-Op Day #: None    Procedure/Dx/Injuries: 51M  s/p mechanical trip and fall, intoxicated, with acute Left sided 6-9th Rib fxs, and T8 vertebral body fx    Events of past 24 hours:  -increased tylenol to 975mg q6  -increased gabapentin to 600mg q8  -started PO Ativan taper as per [facility] protocol  -lisinopril restarted  -, added Lantus 10u and pre-prandial 5u  -endo c/s ordered  - Lantus increased to 12  - 30 Naphos, 2g Mg- f/u 11am repeats    PAST MEDICAL & SURGICAL HISTORY:  HTN (hypertension)    DM (diabetes mellitus)    Alcohol dependency    Diabetes    History of incision and drainage        Vitals:   T(F): 98.2 (01-10-22 @ 00:00), Max: 98.4 (22 @ 12:00)  HR: 93 (01-10-22 @ 08:00)  BP: 159/83 (01-10-22 @ 08:00)  RR: 17 (01-10-22 @ 08:00)  SpO2: 96% (01-10-22 @ 08:00)      Diet, DASH/TLC:   Sodium & Cholesterol Restricted  Consistent Carbohydrate No Snacks      Fluids:     I & O's:    22 @ 07:01  -  01-10-22 @ 07:00  --------------------------------------------------------  IN:    IV PiggyBack: 50 mL    Oral Fluid: 200 mL  Total IN: 250 mL    OUT:  Total OUT: 0 mL    Total NET: 250 mL    PHYSICAL EXAM:  General: NAD  HEENT: EOMI, no scalp lacerations   Neck: Soft, midline trachea. no c-spine tenderness  Chest: chest wall tenderness, no subcutaneous emphysema   Cardiac:: RRR  Respiratory: Bilateral breath sounds, clear and equal bilaterally, IS 3000  Abdomen: Soft, non-distended, non-tender, no rebound, no guarding.  Ext:  Moving b/l upper and lower extremities. Palpable Radial b/l UE, b/l DP palpable in LE.     MEDICATIONS  (STANDING):  acetaminophen     Tablet .. 975 milliGRAM(s) Oral every 6 hours  buprenorphine 8 mG/naloxone 2 mG SL  Tablet 1 Tablet(s) SubLingual daily  enoxaparin Injectable 40 milliGRAM(s) SubCutaneous daily  folic acid 1 milliGRAM(s) Oral daily  gabapentin 600 milliGRAM(s) Oral every 8 hours  glucagon  Injectable 1 milliGRAM(s) IntraMuscular once  ibuprofen  Tablet. 600 milliGRAM(s) Oral every 8 hours  influenza   Vaccine 0.5 milliLiter(s) IntraMuscular once  insulin glargine Injectable (LANTUS) 12 Unit(s) SubCutaneous every morning  insulin lispro Injectable (ADMELOG) 5 Unit(s) SubCutaneous three times a day before meals  insulin regular  human corrective regimen sliding scale   IV Push Before meals and at bedtime  lidocaine   4% Patch 1 Patch Transdermal every 24 hours  lisinopril 10 milliGRAM(s) Oral daily  LORazepam     Tablet 1.5 milliGRAM(s) Oral every 6 hours  LORazepam     Tablet   Oral   pantoprazole    Tablet 40 milliGRAM(s) Oral before breakfast  senna 2 Tablet(s) Oral at bedtime  sodium phosphate IVPB 30 milliMole(s) IV Intermittent once  thiamine 100 milliGRAM(s) Oral daily    MEDICATIONS  (PRN):      DVT PROPHYLAXIS: SCDs, enoxaparin Injectable 40 milliGRAM(s) SubCutaneous daily    GI PROPHYLAXIS: pantoprazole    Tablet 40 milliGRAM(s) Oral before breakfast    ANTICOAGULATION:   ANTIBIOTICS:           LAB/STUDIES:  Labs:  CAPILLARY BLOOD GLUCOSE      POCT Blood Glucose.: 193 mg/dL (10 Kartik 2022 08:15)  POCT Blood Glucose.: 275 mg/dL (2022 21:30)  POCT Blood Glucose.: 267 mg/dL (2022 16:44)  POCT Blood Glucose.: 227 mg/dL (2022 14:30)  POCT Blood Glucose.: 251 mg/dL (2022 11:13)                          11.1   2.25  )-----------( 31       ( 10 Kartik 2022 02:08 )             33.1         01-10    132<L>  |  96<L>  |  14  ----------------------------<  185<H>  4.5   |  25  |  0.6<L>      Calcium, Total Serum: 8.0 mg/dL (01-10-22 @ 02:08)      LFTs:             5.7  | 2.0  | 101      ------------------[88      ( 2022 01:28 )  3.3  | 1.2  | 38          Lipase:x      Amylase:x         Lactate, Blood: 1.9 mmol/L (22 @ 01:28)  Blood Gas Arterial, Lactate: 2.20 mmol/L (22 @ 21:12)  Lactate, Blood: 5.6 mmol/L (22 @ 07:00)    ABG - ( 2022 21:12 )  pH: 7.47  /  pCO2: 39    /  pO2: 74    / HCO3: 28    / Base Excess: 4.5   /  SaO2: 97.2      Coags:    CARDIAC MARKERS ( 2022 01:28 )  x     / x     / 347 U/L / x     / x      CARDIAC MARKERS ( 2022 16:00 )  x     / x     / 466 U/L / x     / x        Urinalysis Basic - ( 2022 02:45 )    Color: Yellow / Appearance: Clear / S.035 / pH: x  Gluc: x / Ketone: Trace  / Bili: Negative / Urobili: 3 mg/dL   Blood: x / Protein: Trace / Nitrite: Negative   Leuk Esterase: Negative / RBC: x / WBC x   Sq Epi: x / Non Sq Epi: x / Bacteria: x      ACCESS DEVICES:  [x] Peripheral IV

## 2022-01-10 NOTE — PHYSICAL THERAPY INITIAL EVALUATION ADULT - PERTINENT HX OF CURRENT PROBLEM, REHAB EVAL
Pt is a 50 yo M with a hx of DM, prior opioid abuse on suboxone, & active alcohol abuse who presents as a trauma transfer from [facility] for multiple rib fractures. Pt reports he was seen in the ER yesterday at the request of his sister due to alcohol abuse; he was discharged home but continued to drink and had a trip and fall last night for which his sister reportedly called an ambulance.

## 2022-01-10 NOTE — CONSULT NOTE ADULT - SUBJECTIVE AND OBJECTIVE BOX
DATE OF ADMISSION: 01-08-22  HOSPITAL DAY: 2d    REASON FOR CONSULT: Uncontrolled FS    HISTORY OF PRESENT ILLNESS: Pt is a 50 yo M with a hx of DM, prior opioid abuse on suboxone, & active alcohol abuse who presents as a trauma transfer from [facility] for multiple rib fractures. Pt reports he was seen in the ER yesterday at the request of his sister due to alcohol abuse; he was discharged home but continued to drink and had a trip and fall last night for which his sister reportedly called an ambulance. The pt states he remembers falling but doesn't remember how he got to the ER. Complaining of right sided chest pain and a headache. Denies fevers, chills, shortness of breath, nausea, vomiting, abdominal pain, changes in bowel habits, or dysuria. Pt endorses daily consumption of 2 pints of vodka as well as several beers. Denies current drug use, auditory or visual hallucinations.     Endocrinology is being consulted for uncontrolled sugar.           PAST MEDICAL & SURGICAL HISTORY:  HTN (hypertension)    DM (diabetes mellitus)    Alcohol dependency    Diabetes    History of incision and drainage    ALLERGIES:  No Known Allergies    MEDICATIONS:  MEDICATIONS  (STANDING):  acetaminophen     Tablet .. 975 milliGRAM(s) Oral every 6 hours  buprenorphine 8 mG/naloxone 2 mG SL  Tablet 1 Tablet(s) SubLingual daily  enoxaparin Injectable 40 milliGRAM(s) SubCutaneous daily  folic acid 1 milliGRAM(s) Oral daily  gabapentin 600 milliGRAM(s) Oral every 8 hours  ibuprofen  Tablet. 600 milliGRAM(s) Oral every 8 hours  influenza   Vaccine 0.5 milliLiter(s) IntraMuscular once  insulin glargine Injectable (LANTUS) 14 Unit(s) SubCutaneous every morning  insulin lispro Injectable (ADMELOG) 5 Unit(s) SubCutaneous three times a day before meals  insulin regular  human corrective regimen sliding scale   SubCutaneous Before meals and at bedtime  lidocaine   4% Patch 1 Patch Transdermal every 24 hours  lisinopril 10 milliGRAM(s) Oral daily  LORazepam     Tablet 1.5 milliGRAM(s) Oral every 6 hours  LORazepam     Tablet   Oral   pantoprazole    Tablet 40 milliGRAM(s) Oral before breakfast  senna 2 Tablet(s) Oral at bedtime  sodium phosphate IVPB 30 milliMole(s) IV Intermittent once  thiamine 100 milliGRAM(s) Oral daily    MEDICATIONS  (PRN):    HOME MEDICATIONS:  Home Medications:  buprenorphine-naloxone 8 mg-2 mg sublingual tablet:  sublingual  (07 Jan 2022 18:02)  lisinopril:  (07 Jan 2022 18:02)      VITALS:   T(C): 36.8 (01-10-22 @ 00:00), Max: 36.8 (01-09-22 @ 16:00)  HR: 93 (01-10-22 @ 08:00) (73 - 93)  BP: 159/83 (01-10-22 @ 08:00) (128/83 - 160/90)  RR: 17 (01-10-22 @ 08:00) (17 - 18)  SpO2: 96% (01-10-22 @ 08:00) (95% - 96%)  Wt(kg): --    01-08-22 @ 07:01  -  01-09-22 @ 07:00  --------------------------------------------------------  IN: 2300 mL / OUT: 900 mL / NET: 1400 mL    01-09-22 @ 07:01  -  01-10-22 @ 07:00  --------------------------------------------------------  IN: 250 mL / OUT: 0 mL / NET: 250 mL    01-10-22 @ 07:01  -  01-10-22 @ 12:26  --------------------------------------------------------  IN: 0 mL / OUT: 1000 mL / NET: -1000 mL      LABS:                        11.1   2.25  )-----------( 31       ( 10 Kartik 2022 02:08 )             33.1     01-10    132<L>  |  96<L>  |  14  ----------------------------<  185<H>  4.5   |  25  |  0.6<L>    Ca    8.0<L>      10 Kartik 2022 02:08  Phos  2.4     01-10  Mg     1.6     01-10    TPro  5.7<L>  /  Alb  3.3<L>  /  TBili  2.0<H>  /  DBili  1.2<H>  /  AST  101<H>  /  ALT  38  /  AlkPhos  88  01-09    CARDIAC MARKERS ( 09 Jan 2022 01:28 )  x     / x     / 347 U/L / x     / x      CARDIAC MARKERS ( 08 Jan 2022 16:00 )  x     / x     / 466 U/L / x     / x        CARDIAC MARKERS:    Creatine Kinase, Serum: 347 U/L (01-09 @ 01:28)  Creatine Kinase, Serum: 466 U/L (01-08 @ 16:00)  Creatine Kinase, Serum: 757 U/L (01-08 @ 07:00)

## 2022-01-10 NOTE — CONSULT NOTE ADULT - ASSESSMENT
50 yo M with a hx of DM, prior opioid abuse on suboxone, & active alcohol abuse who presents as a trauma transfer from [facility] for multiple rib fractures. Endocrinology is being consulted for uncontrolled sugar levels inpatient.     # DM II  - A1C: 8.3 on 1/10/22  - Currently on Lantus 14u, Admelog 5u TID premeal and sliding scale.   - Daily insulin requirement calculated on 1/9/22: 51  - increase Lantus to 25u and Admelog to 8 50 yo M with a hx of DM, prior opioid abuse on suboxone, & active alcohol abuse who presents as a trauma transfer from [facility] for multiple rib fractures. Endocrinology is being consulted for uncontrolled sugar levels inpatient.     # DM II  - A1C: 8.3 on 1/10/22  - Currently on Lantus 14u, Admelog 5u TID premeal and sliding scale.   - Daily insulin requirement calculated on 1/9/22: 51  - increase Lantus to 25u and Admelog to 8    at discharge, will reconsider medication,s and dosage.

## 2022-01-10 NOTE — CONSULT NOTE ADULT - ASSESSMENT
IMPRESSION: Rehab of multitrauma    PRECAUTIONS: [   ] Cardiac  [   ] Respiratory  [   ] Seizures [   ] Contact Isolation  [   ] Droplet Isolation  [   ] Other    Weight Bearing Status:     RECOMMENDATION:    Out of Bed to Chair     DVT/Decubiti Prophylaxis    REHAB PLAN:     [  x  ] Bedside P/T 3-5 times a week   [    ]   Bedside O/T  2-3 times a week             [    ] Speech Therapy               [    ]  No Rehab Therapy Indicated   Conditioning/ROM                                    ADL  Bed Mobility                                               Conditioning/ROM  Transfers                                                     Bed Mobility  Sitting /Standing Balance                         Transfers                                        Gait Training                                               Sitting/Standing Balance  Stair Training [   ]Applicable                    Home equipment Eval                                                                        Splinting  [   ] Only      GOALS:   ADL   [    ]   Independent                    Transfers  [x    ] Independent                          Ambulation  [  x  ] Independent     [   x  ] With device                            [    ]  CG                                                         [    ]  CG                                                                  [    ] CG                            [    ] Min A                                                   [    ] Min A                                                              [    ] Min  A          DISCHARGE PLAN:   [    ]  Good candidate for Intensive Rehabilitation/Hospital based                                             Will tolerate 3hrs Intensive Rehab Daily                                       [  x   ]  Short Term Rehab in Skilled Nursing Facility                        vs               [ x    ]  Home with Outpatient or VN services                                         [     ]  Possible Candidate for Intensive Hospital based Rehab

## 2022-01-11 LAB
A1C WITH ESTIMATED AVERAGE GLUCOSE RESULT: 8.5 % — HIGH (ref 4–5.6)
ANION GAP SERPL CALC-SCNC: 11 MMOL/L — SIGNIFICANT CHANGE UP (ref 7–14)
ANION GAP SERPL CALC-SCNC: 11 MMOL/L — SIGNIFICANT CHANGE UP (ref 7–14)
BASOPHILS # BLD AUTO: 0.01 K/UL — SIGNIFICANT CHANGE UP (ref 0–0.2)
BASOPHILS # BLD AUTO: 0.01 K/UL — SIGNIFICANT CHANGE UP (ref 0–0.2)
BASOPHILS NFR BLD AUTO: 0.3 % — SIGNIFICANT CHANGE UP (ref 0–1)
BASOPHILS NFR BLD AUTO: 0.3 % — SIGNIFICANT CHANGE UP (ref 0–1)
BUN SERPL-MCNC: 13 MG/DL — SIGNIFICANT CHANGE UP (ref 10–20)
BUN SERPL-MCNC: 13 MG/DL — SIGNIFICANT CHANGE UP (ref 10–20)
CALCIUM SERPL-MCNC: 8.1 MG/DL — LOW (ref 8.5–10.1)
CALCIUM SERPL-MCNC: 8.3 MG/DL — LOW (ref 8.5–10.1)
CHLORIDE SERPL-SCNC: 101 MMOL/L — SIGNIFICANT CHANGE UP (ref 98–110)
CHLORIDE SERPL-SCNC: 98 MMOL/L — SIGNIFICANT CHANGE UP (ref 98–110)
CO2 SERPL-SCNC: 21 MMOL/L — SIGNIFICANT CHANGE UP (ref 17–32)
CO2 SERPL-SCNC: 22 MMOL/L — SIGNIFICANT CHANGE UP (ref 17–32)
CREAT SERPL-MCNC: 0.6 MG/DL — LOW (ref 0.7–1.5)
CREAT SERPL-MCNC: 0.7 MG/DL — SIGNIFICANT CHANGE UP (ref 0.7–1.5)
EOSINOPHIL # BLD AUTO: 0.06 K/UL — SIGNIFICANT CHANGE UP (ref 0–0.7)
EOSINOPHIL # BLD AUTO: 0.06 K/UL — SIGNIFICANT CHANGE UP (ref 0–0.7)
EOSINOPHIL NFR BLD AUTO: 2 % — SIGNIFICANT CHANGE UP (ref 0–8)
EOSINOPHIL NFR BLD AUTO: 2 % — SIGNIFICANT CHANGE UP (ref 0–8)
ESTIMATED AVERAGE GLUCOSE: 197 MG/DL — HIGH (ref 68–114)
GLUCOSE BLDC GLUCOMTR-MCNC: 138 MG/DL — HIGH (ref 70–99)
GLUCOSE BLDC GLUCOMTR-MCNC: 153 MG/DL — HIGH (ref 70–99)
GLUCOSE BLDC GLUCOMTR-MCNC: 166 MG/DL — HIGH (ref 70–99)
GLUCOSE BLDC GLUCOMTR-MCNC: 176 MG/DL — HIGH (ref 70–99)
GLUCOSE SERPL-MCNC: 159 MG/DL — HIGH (ref 70–99)
GLUCOSE SERPL-MCNC: 200 MG/DL — HIGH (ref 70–99)
HCT VFR BLD CALC: 33 % — LOW (ref 42–52)
HCT VFR BLD CALC: 34.4 % — LOW (ref 42–52)
HGB BLD-MCNC: 11.2 G/DL — LOW (ref 14–18)
HGB BLD-MCNC: 11.6 G/DL — LOW (ref 14–18)
IMM GRANULOCYTES NFR BLD AUTO: 0.3 % — SIGNIFICANT CHANGE UP (ref 0.1–0.3)
IMM GRANULOCYTES NFR BLD AUTO: 0.3 % — SIGNIFICANT CHANGE UP (ref 0.1–0.3)
LYMPHOCYTES # BLD AUTO: 0.78 K/UL — LOW (ref 1.2–3.4)
LYMPHOCYTES # BLD AUTO: 0.82 K/UL — LOW (ref 1.2–3.4)
LYMPHOCYTES # BLD AUTO: 25.8 % — SIGNIFICANT CHANGE UP (ref 20.5–51.1)
LYMPHOCYTES # BLD AUTO: 27.1 % — SIGNIFICANT CHANGE UP (ref 20.5–51.1)
MAGNESIUM SERPL-MCNC: 1.7 MG/DL — LOW (ref 1.8–2.4)
MAGNESIUM SERPL-MCNC: 1.8 MG/DL — SIGNIFICANT CHANGE UP (ref 1.8–2.4)
MCHC RBC-ENTMCNC: 33 PG — HIGH (ref 27–31)
MCHC RBC-ENTMCNC: 33.5 PG — HIGH (ref 27–31)
MCHC RBC-ENTMCNC: 33.7 G/DL — SIGNIFICANT CHANGE UP (ref 32–37)
MCHC RBC-ENTMCNC: 33.9 G/DL — SIGNIFICANT CHANGE UP (ref 32–37)
MCV RBC AUTO: 98 FL — HIGH (ref 80–94)
MCV RBC AUTO: 98.8 FL — HIGH (ref 80–94)
MONOCYTES # BLD AUTO: 0.47 K/UL — SIGNIFICANT CHANGE UP (ref 0.1–0.6)
MONOCYTES # BLD AUTO: 0.49 K/UL — SIGNIFICANT CHANGE UP (ref 0.1–0.6)
MONOCYTES NFR BLD AUTO: 15.5 % — HIGH (ref 1.7–9.3)
MONOCYTES NFR BLD AUTO: 16.2 % — HIGH (ref 1.7–9.3)
NEUTROPHILS # BLD AUTO: 1.66 K/UL — SIGNIFICANT CHANGE UP (ref 1.4–6.5)
NEUTROPHILS # BLD AUTO: 1.67 K/UL — SIGNIFICANT CHANGE UP (ref 1.4–6.5)
NEUTROPHILS NFR BLD AUTO: 54.8 % — SIGNIFICANT CHANGE UP (ref 42.2–75.2)
NEUTROPHILS NFR BLD AUTO: 55.4 % — SIGNIFICANT CHANGE UP (ref 42.2–75.2)
NRBC # BLD: 0 /100 WBCS — SIGNIFICANT CHANGE UP (ref 0–0)
NRBC # BLD: 0 /100 WBCS — SIGNIFICANT CHANGE UP (ref 0–0)
PHOSPHATE SERPL-MCNC: 3.3 MG/DL — SIGNIFICANT CHANGE UP (ref 2.1–4.9)
PHOSPHATE SERPL-MCNC: 3.4 MG/DL — SIGNIFICANT CHANGE UP (ref 2.1–4.9)
PLATELET # BLD AUTO: 36 K/UL — LOW (ref 130–400)
PLATELET # BLD AUTO: 44 K/UL — LOW (ref 130–400)
POTASSIUM SERPL-MCNC: 4.4 MMOL/L — SIGNIFICANT CHANGE UP (ref 3.5–5)
POTASSIUM SERPL-MCNC: 4.9 MMOL/L — SIGNIFICANT CHANGE UP (ref 3.5–5)
POTASSIUM SERPL-SCNC: 4.4 MMOL/L — SIGNIFICANT CHANGE UP (ref 3.5–5)
POTASSIUM SERPL-SCNC: 4.9 MMOL/L — SIGNIFICANT CHANGE UP (ref 3.5–5)
RBC # BLD: 3.34 M/UL — LOW (ref 4.7–6.1)
RBC # BLD: 3.51 M/UL — LOW (ref 4.7–6.1)
RBC # FLD: 13.5 % — SIGNIFICANT CHANGE UP (ref 11.5–14.5)
RBC # FLD: 13.5 % — SIGNIFICANT CHANGE UP (ref 11.5–14.5)
SODIUM SERPL-SCNC: 130 MMOL/L — LOW (ref 135–146)
SODIUM SERPL-SCNC: 134 MMOL/L — LOW (ref 135–146)
WBC # BLD: 3.02 K/UL — LOW (ref 4.8–10.8)
WBC # BLD: 3.03 K/UL — LOW (ref 4.8–10.8)
WBC # FLD AUTO: 3.02 K/UL — LOW (ref 4.8–10.8)
WBC # FLD AUTO: 3.03 K/UL — LOW (ref 4.8–10.8)

## 2022-01-11 PROCEDURE — 99232 SBSQ HOSP IP/OBS MODERATE 35: CPT

## 2022-01-11 PROCEDURE — 99221 1ST HOSP IP/OBS SF/LOW 40: CPT

## 2022-01-11 RX ADMIN — Medication 975 MILLIGRAM(S): at 17:19

## 2022-01-11 RX ADMIN — ENOXAPARIN SODIUM 40 MILLIGRAM(S): 100 INJECTION SUBCUTANEOUS at 11:35

## 2022-01-11 RX ADMIN — Medication 1 TABLET(S): at 11:35

## 2022-01-11 RX ADMIN — INSULIN HUMAN 3: 100 INJECTION, SOLUTION SUBCUTANEOUS at 17:19

## 2022-01-11 RX ADMIN — Medication 975 MILLIGRAM(S): at 06:45

## 2022-01-11 RX ADMIN — Medication 1 MILLIGRAM(S): at 21:31

## 2022-01-11 RX ADMIN — LISINOPRIL 10 MILLIGRAM(S): 2.5 TABLET ORAL at 05:20

## 2022-01-11 RX ADMIN — GABAPENTIN 600 MILLIGRAM(S): 400 CAPSULE ORAL at 05:19

## 2022-01-11 RX ADMIN — Medication 975 MILLIGRAM(S): at 12:00

## 2022-01-11 RX ADMIN — Medication 1 MILLIGRAM(S): at 12:20

## 2022-01-11 RX ADMIN — INSULIN HUMAN 3: 100 INJECTION, SOLUTION SUBCUTANEOUS at 12:21

## 2022-01-11 RX ADMIN — Medication 8 UNIT(S): at 08:20

## 2022-01-11 RX ADMIN — Medication 8 UNIT(S): at 17:19

## 2022-01-11 RX ADMIN — INSULIN GLARGINE 25 UNIT(S): 100 INJECTION, SOLUTION SUBCUTANEOUS at 14:28

## 2022-01-11 RX ADMIN — BUPRENORPHINE AND NALOXONE 1 TABLET(S): 2; .5 TABLET SUBLINGUAL at 11:34

## 2022-01-11 RX ADMIN — Medication 975 MILLIGRAM(S): at 05:19

## 2022-01-11 RX ADMIN — Medication 600 MILLIGRAM(S): at 15:00

## 2022-01-11 RX ADMIN — SENNA PLUS 2 TABLET(S): 8.6 TABLET ORAL at 21:31

## 2022-01-11 RX ADMIN — Medication 975 MILLIGRAM(S): at 17:59

## 2022-01-11 RX ADMIN — Medication 975 MILLIGRAM(S): at 23:58

## 2022-01-11 RX ADMIN — Medication 600 MILLIGRAM(S): at 14:27

## 2022-01-11 RX ADMIN — Medication 1.5 MILLIGRAM(S): at 05:19

## 2022-01-11 RX ADMIN — Medication 600 MILLIGRAM(S): at 06:15

## 2022-01-11 RX ADMIN — Medication 600 MILLIGRAM(S): at 05:19

## 2022-01-11 RX ADMIN — INSULIN HUMAN 3: 100 INJECTION, SOLUTION SUBCUTANEOUS at 08:21

## 2022-01-11 RX ADMIN — Medication 1 MILLIGRAM(S): at 14:27

## 2022-01-11 RX ADMIN — Medication 8 UNIT(S): at 12:20

## 2022-01-11 RX ADMIN — Medication 975 MILLIGRAM(S): at 00:30

## 2022-01-11 RX ADMIN — Medication 975 MILLIGRAM(S): at 11:34

## 2022-01-11 RX ADMIN — GABAPENTIN 600 MILLIGRAM(S): 400 CAPSULE ORAL at 21:32

## 2022-01-11 RX ADMIN — GABAPENTIN 600 MILLIGRAM(S): 400 CAPSULE ORAL at 14:33

## 2022-01-11 RX ADMIN — Medication 100 MILLIGRAM(S): at 12:20

## 2022-01-11 RX ADMIN — PANTOPRAZOLE SODIUM 40 MILLIGRAM(S): 20 TABLET, DELAYED RELEASE ORAL at 05:20

## 2022-01-11 NOTE — PROGRESS NOTE ADULT - SUBJECTIVE AND OBJECTIVE BOX
TRAUMA SURGERY PROGRESS NOTE    Patient: GINO STEWART , 51y ([birth year])Male   MRN: [id number]  Location: [address]  Visit: 01-08-22 Inpatient  Date: 01-11-22 @ 10:46    Hospital Day #: 4  Post-Op Day #: None    Procedure/Dx/Injuries: 51M  s/p mechanical trip and fall, intoxicated, with acute Left sided 6-9th Rib fxs, and T8 vertebral body fx    Events of past 24 hours: Patient was downgraded from SICU. HD stable, CIWA and SBIRT. Patient seen and examined at bedside. IS 3000    PAST MEDICAL & SURGICAL HISTORY:  HTN (hypertension)    DM (diabetes mellitus)    Alcohol dependency    Diabetes    History of incision and drainage    Vitals:   T(F): 98 (01-11-22 @ 10:22), Max: 98 (01-11-22 @ 10:22)  HR: 83 (01-11-22 @ 10:22)  BP: 155/87 (01-11-22 @ 10:22)  RR: 18 (01-11-22 @ 10:22)  SpO2: 98% (01-11-22 @ 10:22)      Diet, DASH/TLC:   Sodium & Cholesterol Restricted  Consistent Carbohydrate No Snacks      Fluids:     I & O's:    01-10-22 @ 07:01  -  01-11-22 @ 07:00  --------------------------------------------------------  IN:  Total IN: 0 mL    OUT:    Voided (mL): 3000 mL  Total OUT: 3000 mL    Total NET: -3000 mL    Bowel Movement: : [] YES [x] NO  Flatus: : [x] YES [] NO    PHYSICAL EXAM:  General: NAD  HEENT: EOMI, no scalp lacerations   Neck: Soft, midline trachea. no c-spine tenderness  Chest: chest wall tenderness, no subcutaneous emphysema   Cardiac:: RRR  Respiratory: Bilateral breath sounds, clear and equal bilaterally, IS 3000  Abdomen: Soft, non-distended, non-tender, no rebound, no guarding.    MEDICATIONS  (STANDING):  acetaminophen     Tablet .. 975 milliGRAM(s) Oral every 6 hours  buprenorphine 8 mG/naloxone 2 mG SL  Tablet 1 Tablet(s) SubLingual daily  enoxaparin Injectable 40 milliGRAM(s) SubCutaneous daily  folic acid 1 milliGRAM(s) Oral daily  gabapentin 600 milliGRAM(s) Oral every 8 hours  ibuprofen  Tablet. 600 milliGRAM(s) Oral every 8 hours  influenza   Vaccine 0.5 milliLiter(s) IntraMuscular once  insulin glargine Injectable (LANTUS) 25 Unit(s) SubCutaneous every morning  insulin lispro Injectable (ADMELOG) 8 Unit(s) SubCutaneous three times a day before meals  insulin regular  human corrective regimen sliding scale   SubCutaneous Before meals and at bedtime  lidocaine   4% Patch 1 Patch Transdermal every 24 hours  lisinopril 10 milliGRAM(s) Oral daily  LORazepam     Tablet 1 milliGRAM(s) Oral every 8 hours  LORazepam     Tablet   Oral   multivitamin 1 Tablet(s) Oral daily  pantoprazole    Tablet 40 milliGRAM(s) Oral before breakfast  senna 2 Tablet(s) Oral at bedtime  thiamine 100 milliGRAM(s) Oral daily    MEDICATIONS  (PRN):      DVT PROPHYLAXIS: enoxaparin Injectable 40 milliGRAM(s) SubCutaneous daily    GI PROPHYLAXIS: pantoprazole    Tablet 40 milliGRAM(s) Oral before breakfast    ANTICOAGULATION:   ANTIBIOTICS:            LAB/STUDIES:  Labs:  CAPILLARY BLOOD GLUCOSE  180 (10 Kartik 2022 12:00)      POCT Blood Glucose.: 153 mg/dL (11 Jan 2022 07:40)  POCT Blood Glucose.: 162 mg/dL (10 Kartik 2022 20:49)  POCT Blood Glucose.: 244 mg/dL (10 Kartik 2022 16:32)                          10.8   2.94  )-----------( 34       ( 10 Kartik 2022 20:00 )             31.3       Auto Neutrophil %: 57.5 % (01-10-22 @ 20:00)  Auto Immature Granulocyte %: 0.0 % (01-10-22 @ 20:00)    01-10    130<L>  |  98  |  13  ----------------------------<  200<H>  4.4   |  21  |  0.6<L>      Calcium, Total Serum: 8.1 mg/dL (01-10-22 @ 20:00)      LFTs:     Lactate, Blood: 1.9 mmol/L (01-09-22 @ 01:28)  Blood Gas Arterial, Lactate: 2.20 mmol/L (01-08-22 @ 21:12)    ABG - ( 08 Jan 2022 21:12 )  pH: 7.47  /  pCO2: 39    /  pO2: 74    / HCO3: 28    / Base Excess: 4.5   /  SaO2: 97.2        ACCESS/ DEVICES:  [x] Peripheral IV

## 2022-01-11 NOTE — CONSULT NOTE ADULT - CONSULT REASON
Uncontrolled FS
s/p mechanical trip and fall, intoxicated, with acute Left sided 6-9th Rib fxs, and T8 vertebral body fx
T8 VB fx
trauma
Thrombocytopenia

## 2022-01-11 NOTE — PROGRESS NOTE ADULT - ASSESSMENT
ASSESSMENT:  49M PMHx  HTN, DM Type 2, Suboxone use, alcohol abuse, alcohol withdrawal  s/p mechanical trip and fall, intoxicated, with acute Left sided 6-9th Rib fxs, and T8 vertebral body fx    PLAN:   - Continue pain management  - Continue CIWA  - Physiatry: home with outpatient VNS  - Endocrine recs appreciated  - Declined services fron [place]. He was residing with his sister who is no longer willing to take him back in. Lmsw spoke to pt who stated that [employer] team worker spoke to him about [facility] rehab. He claims that he set up apt at [facility] out pt . Family has arranged for pt to go to a facility in [facility] for Etoh rehab. They will take him there upon dc if no other option is available.  - GI/DVT prophylaxis  - Encourage ambulation  - Repletions as needed    Lines/Tubes: PIV    TRAUMA SPECTRA: [id number]

## 2022-01-11 NOTE — CONSULT NOTE ADULT - ATTENDING COMMENTS
pt seen and examine don AM rounds. agree with above plan.
Critical Care: [id number]   This patient has a high probability of sudden, clinically significant deterioration, which requires the highest level of physician preparedness to intervene urgently. I managed/supervised life or organ supporting interventions that required frequent physician assessment. I devoted my full attention in the ICU to the direct care of this patient for the period of time indicated below. Time I spent with the family or surrogate(s) is included only if the patient was incapable of providing the necessary information or participating in medical decision making. Time devoted to teaching and to any procedures I billed separately is not included.     GINO STEWART 51y Male admitted to [x ] SICU /[ ] SDU with high risk for airway obstruction and compromise, hemodynamic instability, DT,   Patient is examined and evaluated at the bedside with SICU team. Treatment plan discussed with SICU team, nurses and primary team.   Chest X-ray and all relevant studies reviewed during rounds.  Will continue hemodynamic monitoring as per protocol in SICU.    Neuro:  GCS [15 ]   [x ]  Neurovascular checks as per SICU protocol                 [ ] 3% NaCl        Paralysis [ ] Yes  [x ]  No      Sedated/Pain control with                 [ ] Dilaudid drip, [ ]  Ketamine, [ ] Fentanyl, [ ] Propofol, [ ] Precedex, [ ] Versed, [ ] Ativan,                           [ ] OxyContin standing,  [ ] OxyContin PRN, [ ] Dilaudid PRN pushes, [ ] Fentanyl PRN pushes, [ ] PCA,                [x ] Tylenol IV/PO, [x ] Gabapentin, [ ]  Ketorolac,  [ x] Lidoderm Patch       Other Medications               [ ] Seroquel, [ ] Zyprexa, [ ] Haldol, [ ] Zyprexa,  [ ] Clonazepam [ ] Xanax, [ ] Versed/Ativan PRN, [ ] Valium [x ] None               [ ] Robaxin   [ ] Baclofen  [ ] Flexeril               [ ] Keppra  [ ] Lamictal  [ ] Depakote  [ ] Dilantin               [ x] CIWA (Ativan/Valium/ Librium)  CV: continue to monitor  On pressors [ ]  Yes  [ x]  No          [ ]  Levophed, [ ] Bebeto-Synephrine, [ ] Vasopressin, [ ]  Epinephrine          [ ] Dobutamine, [ ] Milrinone, [ ]  Midodrine,  [ ] Others    Other Cardiac Meds          [ ] Amiodarone IV/PO, [ ] Digoxin, [ ] Cardizem drip, [ ] Cleviprex drip, [ ] Esmolol drip  VBG - ( 07 Jan 2022 00:29 )  pH: 7.35  /  pCO2: 53    /  pO2: 44    / HCO3: 29    / Base Excess: 2.5   /  SaO2: 71.2   Lactate: 5.90   Respiratory: Acute respiratory insufficiency -> continue to monitor                        None Invasive Support  [x ] Incentive Spirometer                                  [ ] BiPAP   [ ] CPAP/NIV   [ ] HFNC   [ ] NR Face Mask   [x ] NC  [ ] Trach Caller                        Ventilatory support  [ ] Yes [ x] No   [ ] SBT                                  [ ] PC    [ ] VC   [ ] AC   [ ] BiVent/APRV   [ ]CPAP   GI  [x ]  bowel regiment  [x ] BM none [x ] Flatus+        Prophylaxis [ ] PPI  [ ] H2 Blockers  [ ] Others  Nutrition: continue   [x ] Diet for now  [ ] TPN/PPN   [ ] calories count   [ ]  Tube Feeds     Renal: Continue I&Os monitoring, Oliver catheter  [ ] Yes,  [x ]   No ,  [ ] Consideration for discontinuation [ ] Taxes cath/PrimaFit  [ ] TOV       Lytes/Acid-base: replete hypokalemia, hypomagnesemia, hypocalcemia, hypophosphatemia        IV Fluids   [x ] LR,  [ ] NS  [ ] D5W1/2NS [ ] Albumin   ID: leukocytosis -> continue to monitor:         IV Abx [ ] Yes, [x ] No;  ID consulted [ ] Yes, [x ] No        Cultures send  [ ] Respiratory   [ ] Blood   [ ] Urine  [ ] Fluids  [ ] Tissue  [x ]  None  Lines:   [ ] Right   [ ] Left  [ ] Bilateral                     [ ] Subclavian TLC        [ ]  Internal Jugular TLC     [ ]  Femoral TLC                     [ ] Subclavian Cordis    [ ] Internal Jugular Cordis   [ ] Femoral Cordis                     [ ] HD catheter    [ ] PICC     [ ]  Midline   [ x] Peripheral IVs                                                 [ ] Right   [ ] Left   [x ] None                     [ ] Radial A-Line    [ ] Femoral A-Line   [ ] Axillary A-Line               Heme: continue to evaluate for acute blood loss anemia- trend Hg/Hct                     AC Reversal Indicated [ ] Yes  [x ] No                    Transfused  indicated  [ ] Yes, [x ] No    [ ] PRBCs   [ ] Platelets   [ ] FFPs   [ ] Cryoprecipitate                    Should be started on or continued with  following  [ ] Yes,  [ x] No                               [ ] Lovenox  [ ] Coumadin  [ ] Heparin drip  [ ] NOVACs  [ ] ASA  [ ] Antiplatelets   Endocrine: Prevent and treat hyperglycemia with insulin as needed,                                 Insuline drip [ ] Yes, [ x] No   PV: follow pulse exam  Skin: decub precautions  DVT Prophylaxis:  [x ] SCDs  [x ] Heparin SQ  [ ] Lovenox  SQ  Stress Gastritis Prophylaxis: PPI/H2 Blockers if indicated  Mobility: patient is evaluated at the bedside with mobility team and the goals for today are discussed with PT [x ]    PATIENT/FAMILY/SURROGATE CONFERENCE:  [x ] Yes with patient at the bedside. [ ] No  PURPOSE: To obtain necessary information, To discuss treatment options under consideration today.    I saw and evaluated the patient personally. I have reviewed and agree with note above. Treatment plan discussed with SICU team, nurses and primary team at the time of the multidisciplinary rounds. The above note is NOT written at the time of rounds and will reflect all changes throughout management of the patient for the day note is written for.    Mehnaz Anglin MD, FACS  Trauma/ACS/SCC Attending
as abovee, control diabetes with insulin for now, when ready for discharge, will discuss discharge medications.
We were asked to see the patient for chronic thrombocytopenia in the setting of alcohol abuse.  Previous work-up for thrombocytopenia was negative for HIV, hep C and normal B12 and folate    Plan  -His chronic severe to moderate thrombocytopenia is due to bone marrow suppression from alcohol abuse recommend he stops drinking  -Keep platelets greater than 10,000 if not bleeding and if bleeding needs to be greater than 50,000 and for invasive procedures as well  -Platelet count should start improving with time as he is off alcohol

## 2022-01-11 NOTE — CONSULT NOTE ADULT - SUBJECTIVE AND OBJECTIVE BOX
Patient is a 51y old  Male who presents with a chief complaint of multiple rib fractures     HPI: Pt is a 50 yo M with a hx of DM, prior opioid abuse on suboxone, & active alcohol abuse who presents as a trauma transfer from [facility] for multiple rib fractures. The pt states he remembers falling but doesn't remember how he got to the ER. Complaining of right sided chest pain and a headache. Denies fevers, chills, shortness of breath, nausea, vomiting, abdominal pain, changes in bowel habits, or dysuria. Pt endorses daily consumption of 2 pints of vodka as well as several beers. Denies current drug use, auditory or visual hallucinations. Pt is now on surgical floor transferred from the SICU.  Team consulted for thrombocytopenia.     PAST MEDICAL & SURGICAL HISTORY:  HTN (hypertension)  DM (diabetes mellitus)  Alcohol dependency  Diabetes  History of incision and drainage    Allergies:  No Known Allergies    Home Medications:  buprenorphine-naloxone 8 mg-2 mg sublingual tablet:  sublingual  (07 Jan 2022 18:02)  lisinopril:  (07 Jan 2022 18:02)    ROS: 10-system review is otherwise negative except HPI above.      Primary Survey:    A - airway intact  B - bilateral breath sounds and good chest rise  C - palpable pulses in all extremities  D - GCS 15 on arrival, PALOMO  Exposure obtained    Vital Signs Last 24 Hrs  T(C): 36.1 (08 Jan 2022 10:29), Max: 36.1 (08 Jan 2022 07:15)  T(F): 96.9 (08 Jan 2022 10:29), Max: 97 (08 Jan 2022 07:15)  HR: 80 (08 Jan 2022 10:29) (70 - 85)  BP: 137/71 (08 Jan 2022 10:29) (100/68 - 143/78)  BP(mean): --  RR: 18 (08 Jan 2022 10:29) (18 - 20)  SpO2: 96% (08 Jan 2022 10:29) (95% - 96%)    Secondary Survey:   General: NAD  HEENT: Normocephalic, EOMI, PEERLA. no scalp lacerations. 3cm circular area of ecchymosis over left cheekbone and small area above left eyebrow  Neck: Soft, midline trachea. no cspine tenderness  Chest: No subcutaneous emphysema. tender over left lateral chest wall where there is also some ecchymosis  Cardiac: S1, S2, RRR  Respiratory: Bilateral breath sounds, clear and equal bilaterally. good effort  Abdomen: large area of ecchymosis over epigastrium. Soft, non-distended, non-tender, no rebound,   Groin: Normal appearing, pelvis stable   Ext: palp radial b/l UE, b/l DP palp in Lower Extrem. stasis changes. multiple bruises in various stages of healing over all extremities. abrasions to b/l knees.  Back: no TTP, no palpable runoff/stepoff/deformity. multiple bruises in various stages of healing over b/l flanks and sacrum. large area of bruising over left buttock.    FAST: not done    Procedures: none    LABS:  Labs:  CAPILLARY BLOOD GLUCOSE    POCT Blood Glucose.: 286 mg/dL (08 Jan 2022 06:41)  POCT Blood Glucose.: 271 mg/dL (07 Jan 2022 17:53)                          13.2   7.37  )-----------( 82       ( 08 Jan 2022 07:00 )             39.7       Auto Neutrophil %: 64.8 % (01-08-22 @ 07:00)  Auto Immature Granulocyte %: 0.3 % (01-08-22 @ 07:00)    01-08    136  |  95<L>  |  12  ----------------------------<  322<H>  4.9   |  21  |  0.7    Calcium, Total Serum: 8.9 mg/dL (01-08-22 @ 07:00)    LFTs:             7.1  | 2.3  | 129      ------------------[106     ( 08 Jan 2022 07:00 )  3.9  | x    | 47          Lipase:29     Amylase:       Lactate, Blood: 5.6 mmol/L (01-08-22 @ 07:00)  Lactate, Blood: 3.9 mmol/L (01-07-22 @ 04:40)  Blood Gas Venous - Lactate: 5.90 mmol/L (01-07-22 @ 00:29)    Coags:     15.20  ----< 1.33    ( 08 Jan 2022 07:00 )     41.1      CARDIAC MARKERS ( 08 Jan 2022 07:00 )  x     / x     / 757 U/L / x     / x      CARDIAC MARKERS ( 07 Jan 2022 01:45 )  x     / x     / 486 U/L / x     / x        Alcohol, Blood: 380 mg/dL (01-08-22 @ 07:00)     (08 Jan 2022 13:38)       ROS:  Negative except for:    PAST MEDICAL & SURGICAL HISTORY:  HTN (hypertension)    DM (diabetes mellitus)    Alcohol dependency    Diabetes    History of incision and drainage        SOCIAL HISTORY:    FAMILY HISTORY:  FH: alcohol abuse  dad        MEDICATIONS  (STANDING):  acetaminophen     Tablet .. 975 milliGRAM(s) Oral every 6 hours  buprenorphine 8 mG/naloxone 2 mG SL  Tablet 1 Tablet(s) SubLingual daily  enoxaparin Injectable 40 milliGRAM(s) SubCutaneous daily  folic acid 1 milliGRAM(s) Oral daily  gabapentin 600 milliGRAM(s) Oral every 8 hours  ibuprofen  Tablet. 600 milliGRAM(s) Oral every 8 hours  influenza   Vaccine 0.5 milliLiter(s) IntraMuscular once  insulin glargine Injectable (LANTUS) 25 Unit(s) SubCutaneous every morning  insulin lispro Injectable (ADMELOG) 8 Unit(s) SubCutaneous three times a day before meals  insulin regular  human corrective regimen sliding scale   SubCutaneous Before meals and at bedtime  lidocaine   4% Patch 1 Patch Transdermal every 24 hours  lisinopril 10 milliGRAM(s) Oral daily  LORazepam     Tablet 1 milliGRAM(s) Oral every 8 hours  LORazepam     Tablet   Oral   multivitamin 1 Tablet(s) Oral daily  pantoprazole    Tablet 40 milliGRAM(s) Oral before breakfast  senna 2 Tablet(s) Oral at bedtime  thiamine 100 milliGRAM(s) Oral daily    MEDICATIONS  (PRN):    Height (cm): 185.4 (01-10-22 @ 16:01)  Weight (kg): 118.1 (01-10-22 @ 16:01)  BMI (kg/m2): 34.4 (01-10-22 @ 16:01)  BSA (m2): 2.41 (01-10-22 @ 16:01)  Allergies    No Known Allergies    Intolerances        Vital Signs Last 24 Hrs  T(C): 36.7 (11 Jan 2022 10:22), Max: 36.7 (11 Jan 2022 10:22)  T(F): 98 (11 Jan 2022 10:22), Max: 98 (11 Jan 2022 10:22)  HR: 83 (11 Jan 2022 10:22) (66 - 86)  BP: 155/87 (11 Jan 2022 10:22) (135/86 - 158/94)  BP(mean): --  RR: 18 (11 Jan 2022 10:22) (17 - 18)  SpO2: 98% (11 Jan 2022 10:22) (97% - 98%)    PHYSICAL EXAM  General: adult in NAD  HEENT: clear oropharynx, anicteric sclera, pink conjunctiva  Neck: supple  CV: normal S1/S2 with no murmur rubs or gallops  Lungs: positive air movement b/l ant lungs,clear to auscultation, no wheezes, no rales  Abdomen: soft non-tender non-distended, no hepatosplenomegaly  Ext: no clubbing cyanosis or edema  Skin: no rashes and no petechiae  Neuro: alert and oriented X 4, no focal deficits      LABS:                          10.8   2.94  )-----------( 34       ( 10 Kartik 2022 20:00 )             31.3         Mean Cell Volume : 96.6 fL  Mean Cell Hemoglobin : 33.3 pg  Mean Cell Hemoglobin Concentration : 34.5 g/dL  Auto Neutrophil # : 1.69 K/uL  Auto Lymphocyte # : 0.69 K/uL  Auto Monocyte # : 0.49 K/uL  Auto Eosinophil # : 0.06 K/uL  Auto Basophil # : 0.01 K/uL  Auto Neutrophil % : 57.5 %  Auto Lymphocyte % : 23.5 %  Auto Monocyte % : 16.7 %  Auto Eosinophil % : 2.0 %  Auto Basophil % : 0.3 %      Serial CBC's  01-10 @ 20:00  Hct-31.3 / Hgb-10.8 / Plat-34 / RBC-3.24 / WBC-2.94  Serial CBC's  01-10 @ 02:08  Hct-33.1 / Hgb-11.1 / Plat-31 / RBC-3.37 / WBC-2.25  Serial CBC's  01-09 @ 06:00  Hct-32.1 / Hgb-11.0 / Plat-33 / RBC-3.27 / WBC-2.70  Serial CBC's  01-09 @ 01:28  Hct-31.3 / Hgb-10.6 / Plat-35 / RBC-3.21 / WBC-2.74  Serial CBC's  01-08 @ 07:00  Hct-39.7 / Hgb-13.2 / Plat-82 / RBC-4.01 / WBC-7.37      01-10    130<L>  |  98  |  13  ----------------------------<  200<H>  4.4   |  21  |  0.6<L>    Ca    8.1<L>      10 Kartik 2022 20:00  Phos  3.3     01-10  Mg     1.7     01-10                        BLOOD SMEAR INTERPRETATION:   Manual Differential (10.16.19 @ 07:00)   Manual Smear Verification: Performed   Red Cell Morphology: Normal   Platelet Morphology: Normal   Giant Platelets: Present   Anisocytosis: Slight   Polychromasia: Slight   Metamyelocytes %: 0.9 %   Nucleated RBC: 2 /100     RADIOLOGY & ADDITIONAL STUDIES:  < from: CT Chest w/ IV Cont (01.08.22 @ 07:28) >  IMPRESSION:    Multiple left-sided rib fractures.    Fracture through the T8 vertebral body.    Undulation of the hepatic contour may represent early cirrhosis.    Trace nonspecific ascites.    < end of copied text >     Patient is a 51y old  Male who presents with a chief complaint of multiple rib fractures     HPI: Pt is a 52 yo M with a hx of DM, prior opioid abuse on suboxone, & active alcohol abuse who presents as a trauma transfer from [facility] for multiple rib fractures. The pt states he remembers falling but doesn't remember how he got to the ER. Complaining of right sided chest pain and a headache. Denies fevers, chills, shortness of breath, nausea, vomiting, abdominal pain, changes in bowel habits, or dysuria. Pt endorses daily consumption of 2 pints of vodka as well as several beers. Denies current drug use, auditory or visual hallucinations. Pt is now on surgical floor transferred from the SICU.  Team consulted for thrombocytopenia.     PAST MEDICAL & SURGICAL HISTORY:  HTN (hypertension)  DM (diabetes mellitus)  Alcohol dependency  Diabetes  History of incision and drainage    Allergies:  No Known Allergies    Home Medications:  buprenorphine-naloxone 8 mg-2 mg sublingual tablet:  sublingual  (07 Jan 2022 18:02)  lisinopril:  (07 Jan 2022 18:02)      LABS:  Labs:  CAPILLARY BLOOD GLUCOSE    POCT Blood Glucose.: 286 mg/dL (08 Jan 2022 06:41)  POCT Blood Glucose.: 271 mg/dL (07 Jan 2022 17:53)                          13.2   7.37  )-----------( 82       ( 08 Jan 2022 07:00 )             39.7       Auto Neutrophil %: 64.8 % (01-08-22 @ 07:00)  Auto Immature Granulocyte %: 0.3 % (01-08-22 @ 07:00)    01-08    136  |  95<L>  |  12  ----------------------------<  322<H>  4.9   |  21  |  0.7    Calcium, Total Serum: 8.9 mg/dL (01-08-22 @ 07:00)    LFTs:             7.1  | 2.3  | 129      ------------------[106     ( 08 Jan 2022 07:00 )  3.9  | x    | 47          Lipase:29     Amylase:       Lactate, Blood: 5.6 mmol/L (01-08-22 @ 07:00)  Lactate, Blood: 3.9 mmol/L (01-07-22 @ 04:40)  Blood Gas Venous - Lactate: 5.90 mmol/L (01-07-22 @ 00:29)    Coags:     15.20  ----< 1.33    ( 08 Jan 2022 07:00 )     41.1      CARDIAC MARKERS ( 08 Jan 2022 07:00 )  x     / x     / 757 U/L / x     / x      CARDIAC MARKERS ( 07 Jan 2022 01:45 )  x     / x     / 486 U/L / x     / x        Alcohol, Blood: 380 mg/dL (01-08-22 @ 07:00)     (08 Jan 2022 13:38)       ROS:  Negative except for: Painful to inspire, pain on L rib cage, DANIKA, increased weight     PAST MEDICAL & SURGICAL HISTORY:  HTN (hypertension)    DM (diabetes mellitus)    Alcohol dependency    Diabetes    History of incision and drainage        SOCIAL HISTORY: Pt lives in  with brother and sister. Is currently unemployed.     FAMILY HISTORY:  FH: alcohol abuse  dad  Mother: passed away from Alzheimer's  Sister: passed away from MI         MEDICATIONS  (STANDING):  acetaminophen     Tablet .. 975 milliGRAM(s) Oral every 6 hours  buprenorphine 8 mG/naloxone 2 mG SL  Tablet 1 Tablet(s) SubLingual daily  enoxaparin Injectable 40 milliGRAM(s) SubCutaneous daily  folic acid 1 milliGRAM(s) Oral daily  gabapentin 600 milliGRAM(s) Oral every 8 hours  ibuprofen  Tablet. 600 milliGRAM(s) Oral every 8 hours  influenza   Vaccine 0.5 milliLiter(s) IntraMuscular once  insulin glargine Injectable (LANTUS) 25 Unit(s) SubCutaneous every morning  insulin lispro Injectable (ADMELOG) 8 Unit(s) SubCutaneous three times a day before meals  insulin regular  human corrective regimen sliding scale   SubCutaneous Before meals and at bedtime  lidocaine   4% Patch 1 Patch Transdermal every 24 hours  lisinopril 10 milliGRAM(s) Oral daily  LORazepam     Tablet 1 milliGRAM(s) Oral every 8 hours  LORazepam     Tablet   Oral   multivitamin 1 Tablet(s) Oral daily  pantoprazole    Tablet 40 milliGRAM(s) Oral before breakfast  senna 2 Tablet(s) Oral at bedtime  thiamine 100 milliGRAM(s) Oral daily    MEDICATIONS  (PRN):    Height (cm): 185.4 (01-10-22 @ 16:01)  Weight (kg): 118.1 (01-10-22 @ 16:01)  BMI (kg/m2): 34.4 (01-10-22 @ 16:01)  BSA (m2): 2.41 (01-10-22 @ 16:01)  Allergies    No Known Allergies    Intolerances        Vital Signs Last 24 Hrs  T(C): 36.7 (11 Jan 2022 10:22), Max: 36.7 (11 Jan 2022 10:22)  T(F): 98 (11 Jan 2022 10:22), Max: 98 (11 Jan 2022 10:22)  HR: 83 (11 Jan 2022 10:22) (66 - 86)  BP: 155/87 (11 Jan 2022 10:22) (135/86 - 158/94)  BP(mean): --  RR: 18 (11 Jan 2022 10:22) (17 - 18)  SpO2: 98% (11 Jan 2022 10:22) (97% - 98%)    PHYSICAL EXAM  General: adult in NAD laying on stretcher   HEENT: clear oropharynx, anicteric sclera, pink conjunctiva  Neck: supple  CV: normal S1/S2 with no murmur rubs or gallops  Lungs: positive air movement b/l ant lungs,clear to auscultation, painful to inspire   Abdomen: soft, distended  Ext: b/l leg edema, non pitting, scrapes on both knees   Skin: bruises on left arm, bruises on chest wall   Neuro: alert and oriented X 4, no focal deficits      LABS:                          10.8   2.94  )-----------( 34       ( 10 Kartik 2022 20:00 )             31.3         Mean Cell Volume : 96.6 fL  Mean Cell Hemoglobin : 33.3 pg  Mean Cell Hemoglobin Concentration : 34.5 g/dL  Auto Neutrophil # : 1.69 K/uL  Auto Lymphocyte # : 0.69 K/uL  Auto Monocyte # : 0.49 K/uL  Auto Eosinophil # : 0.06 K/uL  Auto Basophil # : 0.01 K/uL  Auto Neutrophil % : 57.5 %  Auto Lymphocyte % : 23.5 %  Auto Monocyte % : 16.7 %  Auto Eosinophil % : 2.0 %  Auto Basophil % : 0.3 %      Serial CBC's  01-10 @ 20:00  Hct-31.3 / Hgb-10.8 / Plat-34 / RBC-3.24 / WBC-2.94  Serial CBC's  01-10 @ 02:08  Hct-33.1 / Hgb-11.1 / Plat-31 / RBC-3.37 / WBC-2.25  Serial CBC's  01-09 @ 06:00  Hct-32.1 / Hgb-11.0 / Plat-33 / RBC-3.27 / WBC-2.70  Serial CBC's  01-09 @ 01:28  Hct-31.3 / Hgb-10.6 / Plat-35 / RBC-3.21 / WBC-2.74  Serial CBC's  01-08 @ 07:00  Hct-39.7 / Hgb-13.2 / Plat-82 / RBC-4.01 / WBC-7.37      01-10    130<L>  |  98  |  13  ----------------------------<  200<H>  4.4   |  21  |  0.6<L>    Ca    8.1<L>      10 Kartik 2022 20:00  Phos  3.3     01-10  Mg     1.7     01-10                        BLOOD SMEAR INTERPRETATION:   Manual Differential (10.16.19 @ 07:00)   Manual Smear Verification: Performed   Red Cell Morphology: Normal   Platelet Morphology: Normal   Giant Platelets: Present   Anisocytosis: Slight   Polychromasia: Slight   Metamyelocytes %: 0.9 %   Nucleated RBC: 2 /100     RADIOLOGY & ADDITIONAL STUDIES:  < from: CT Chest w/ IV Cont (01.08.22 @ 07:28) >  IMPRESSION:    Multiple left-sided rib fractures.    Fracture through the T8 vertebral body.    Undulation of the hepatic contour may represent early cirrhosis.    Trace nonspecific ascites.    < end of copied text >

## 2022-01-11 NOTE — CONSULT NOTE ADULT - ASSESSMENT
Assessment: Pt is a 52 yo M with a hx of DM, prior opioid abuse on suboxone, & active alcohol abuse who presents as a trauma transfer from [facility] for multiple rib fractures. The pt states he remembers falling but doesn't remember how he got to the ER. Complaining of right sided chest pain and a headache. Pt endorses daily consumption of 2 pints of vodka as well as several beers. Denies current drug use, auditory or visual hallucinations. Pt is now on surgical floor transferred from the SICU.    #Thrombocytopenia - Likely secondary to Chronic Alcohol Use   - Admission Labs: Wbc=3.8, Hgb: 13.9, Hct:39, MCV:99, Plt #57k   - Previous Labs from 8/2021 WBC ~ 2-3, Hgb ~ 12, Plt # 40-50k, Labs from 10/2019: Plt # 30k --> 90k   - Plt # trending downwards since admission: 57k--> 35k --> 31k --> 34k   - Pt uses alcohol chronically   - Pt is on lovenox     Plan: Assessment: Pt is a 50 yo M with a hx of DM, prior opioid abuse on suboxone, & active alcohol abuse who presents as a trauma transfer from [facility] for multiple rib fractures. The pt states he remembers falling but doesn't remember how he got to the ER. Complaining of right sided chest pain and a headache. Pt endorses daily consumption of 2 pints of vodka as well as several beers. Denies current drug use, auditory or visual hallucinations. Pt is now on surgical floor transferred from the SICU.    #Thrombocytopenia - Likely secondary to Chronic Alcohol Use   - Admission Labs: Wbc=3.8, Hgb: 13.9, Hct:39, MCV:99, Plt #57k   - Previous Labs from 8/2021 WBC ~ 2-3, Hgb ~ 12, Plt # 40-50k, Labs from 10/2019: Plt # 30k --> 90k   - Plt # trending downwards since admission: 57k--> 35k --> 31k --> 34k   - Pt uses alcohol chronically   - Pt is on lovenox, has not been in time period for possible STORM, has had heparin years ago   - Pt denies night sweats, fevers, had DANIKA but gained weight due to alcohol use     Plan:  -  pt on alcohol cessation   -  Assessment: Pt is a 50 yo M with a hx of DM, prior opioid abuse on suboxone, & active alcohol abuse who presents as a trauma transfer from [facility] for multiple rib fractures. The pt states he remembers falling but doesn't remember how he got to the ER. Complaining of right sided chest pain and a headache. Pt endorses daily consumption of 2 pints of vodka as well as several beers. Denies current drug use, auditory or visual hallucinations. Pt is now on surgical floor transferred from the SICU.    #Thrombocytopenia - Likely secondary to Chronic Alcohol Use   - Admission Labs: Wbc=3.8, Hgb: 13.9, Hct:39, MCV:99, Plt #57k   - Previous Labs from 8/2021 WBC ~ 2-3, Hgb ~ 12, Plt # 40-50k, Labs from 10/2019: Plt # 30k --> 90k   - Plt # trending downwards since admission: 57k--> 35k --> 31k --> 34k   - Pt uses alcohol chronically   - Pt is on lovenox, has not been in time period for possible STORM, has had heparin years ago   - Pt denies night sweats, fevers, had DANIKA but gained weight due to alcohol use. Bleeding from gums occur once in a while when brushing teeth   - No family hx of bleeding disorder     Plan:  -  pt on alcohol cessation   -  Assessment: Pt is a 50 yo M with a hx of DM, prior opioid abuse on suboxone, & active alcohol abuse who presents as a trauma transfer from [facility] for multiple rib fractures. The pt states he remembers falling but doesn't remember how he got to the ER. Complaining of right sided chest pain and a headache. Pt endorses daily consumption of 2 pints of vodka as well as several beers. Denies current drug use, auditory or visual hallucinations. Pt is now on surgical floor transferred from the SICU.    #Thrombocytopenia - Likely secondary to Chronic Alcohol Use   - Admission Labs: Wbc=3.8, Hgb: 13.9, Hct:39, MCV:99, Plt #57k   - Previous Labs from 8/2021 WBC ~ 2-3, Hgb ~ 12, Plt # 40-50k, Labs from 10/2019: Plt # 30k --> 90k   - Plt # trending downwards since admission: 57k--> 35k --> 31k --> 34k   - Pt uses alcohol chronically   - CTAP reveals cirrhosis, no splenomegaly   - Pt denies night sweats, fevers, had DANIKA but gained weight due to alcohol use. Bleeding from gums occur once in a while when brushing teeth   - No family hx of bleeding disorder     Plan:  -  pt on alcohol cessation - will improve plt count    Assessment: Pt is a 52 yo M with a hx of DM, prior opioid abuse on suboxone, & active alcohol abuse who presents as a trauma transfer from [facility] for multiple rib fractures. The pt states he remembers falling but doesn't remember how he got to the ER. Complaining of right sided chest pain and a headache. Pt endorses daily consumption of 2 pints of vodka as well as several beers. Denies current drug use, auditory or visual hallucinations. Pt is now on surgical floor transferred from the SICU.    # Thrombocytopenia likely secondary to myelosuppression from chronic alcohol use   - Admission Labs: Wbc: 3.8, Hgb:13.9, Hct: 39, MCV: 99, Plt: 57k   - Previous Labs from 8/2021 WBC ~ 2-3, Hgb ~ 12, Plt # 40-50k, Labs from 10/2019: Plt # 30k --> 90k   - Plt # trending downwards since admission: 57k--> 35k --> 31k --> 34k   - Pt uses alcohol chronically   - CT AP reveals early sign of cirrhosis, no splenomegaly   - Pt denies night sweats, fevers, had DANIKA but gained weight due to alcohol use. Bleeding from gums occur once in a while when brushing teeth   - No family hx of bleeding disorder     Plan:  - recommend alcohol cessation

## 2022-01-12 ENCOUNTER — TRANSCRIPTION ENCOUNTER (OUTPATIENT)
Age: 52
End: 2022-01-12

## 2022-01-12 VITALS
RESPIRATION RATE: 18 BRPM | HEART RATE: 79 BPM | TEMPERATURE: 99 F | DIASTOLIC BLOOD PRESSURE: 70 MMHG | SYSTOLIC BLOOD PRESSURE: 153 MMHG | OXYGEN SATURATION: 98 %

## 2022-01-12 DIAGNOSIS — E11.65 TYPE 2 DIABETES MELLITUS WITH HYPERGLYCEMIA: ICD-10-CM

## 2022-01-12 LAB
GLUCOSE BLDC GLUCOMTR-MCNC: 140 MG/DL — HIGH (ref 70–99)
GLUCOSE BLDC GLUCOMTR-MCNC: 161 MG/DL — HIGH (ref 70–99)
GLUCOSE BLDC GLUCOMTR-MCNC: 193 MG/DL — HIGH (ref 70–99)
GLUCOSE BLDC GLUCOMTR-MCNC: 205 MG/DL — HIGH (ref 70–99)
GLUCOSE BLDC GLUCOMTR-MCNC: 227 MG/DL — HIGH (ref 70–99)
GLUCOSE BLDC GLUCOMTR-MCNC: 244 MG/DL — HIGH (ref 70–99)
SARS-COV-2 RNA SPEC QL NAA+PROBE: SIGNIFICANT CHANGE UP

## 2022-01-12 PROCEDURE — 99232 SBSQ HOSP IP/OBS MODERATE 35: CPT

## 2022-01-12 RX ORDER — PIOGLITAZONE HYDROCHLORIDE 15 MG/1
1 TABLET ORAL
Qty: 21 | Refills: 0
Start: 2022-01-12 | End: 2022-02-01

## 2022-01-12 RX ORDER — ACETAMINOPHEN 500 MG
3 TABLET ORAL
Qty: 84 | Refills: 0
Start: 2022-01-12 | End: 2022-01-18

## 2022-01-12 RX ORDER — LISINOPRIL 2.5 MG/1
0 TABLET ORAL
Qty: 0 | Refills: 0 | DISCHARGE

## 2022-01-12 RX ORDER — BLOOD SUGAR DIAGNOSTIC
STRIP MISCELLANEOUS 3 TIMES DAILY
Qty: 100 | Refills: 3 | Status: ACTIVE | COMMUNITY
Start: 2022-01-12 | End: 1900-01-01

## 2022-01-12 RX ORDER — SENNA PLUS 8.6 MG/1
2 TABLET ORAL
Qty: 0 | Refills: 0 | DISCHARGE
Start: 2022-01-12

## 2022-01-12 RX ORDER — FOLIC ACID 0.8 MG
1 TABLET ORAL
Qty: 0 | Refills: 0 | DISCHARGE
Start: 2022-01-12

## 2022-01-12 RX ORDER — THIAMINE MONONITRATE (VIT B1) 100 MG
1 TABLET ORAL
Qty: 0 | Refills: 0 | DISCHARGE
Start: 2022-01-12

## 2022-01-12 RX ORDER — LANCETS
EACH MISCELLANEOUS
Qty: 100 | Refills: 0 | Status: ACTIVE | COMMUNITY
Start: 2022-01-12 | End: 1900-01-01

## 2022-01-12 RX ORDER — LISINOPRIL 2.5 MG/1
10 TABLET ORAL
Qty: 0 | Refills: 0 | DISCHARGE

## 2022-01-12 RX ORDER — MAGNESIUM SULFATE 500 MG/ML
2 VIAL (ML) INJECTION ONCE
Refills: 0 | Status: COMPLETED | OUTPATIENT
Start: 2022-01-12 | End: 2022-01-12

## 2022-01-12 RX ORDER — SEMAGLUTIDE 1.34 MG/ML
2 INJECTION, SOLUTION SUBCUTANEOUS
Qty: 1 | Refills: 1 | Status: ACTIVE | COMMUNITY
Start: 2022-01-12 | End: 1900-01-01

## 2022-01-12 RX ORDER — INSULIN LISPRO 100/ML
VIAL (ML) SUBCUTANEOUS
Refills: 0 | Status: DISCONTINUED | OUTPATIENT
Start: 2022-01-12 | End: 2022-01-12

## 2022-01-12 RX ORDER — BLOOD-GLUCOSE METER
W/DEVICE KIT MISCELLANEOUS
Qty: 1 | Refills: 0 | Status: ACTIVE | COMMUNITY
Start: 2022-01-12 | End: 1900-01-01

## 2022-01-12 RX ORDER — METFORMIN HYDROCHLORIDE 1000 MG/1
1000 TABLET, COATED ORAL
Qty: 60 | Refills: 3 | Status: ACTIVE | COMMUNITY
Start: 2022-01-12 | End: 1900-01-01

## 2022-01-12 RX ORDER — INSULIN LISPRO 100/ML
10 VIAL (ML) SUBCUTANEOUS ONCE
Refills: 0 | Status: COMPLETED | OUTPATIENT
Start: 2022-01-12 | End: 2022-01-12

## 2022-01-12 RX ORDER — PIOGLITAZONE HYDROCHLORIDE 30 MG/1
30 TABLET ORAL
Qty: 30 | Refills: 3 | Status: ACTIVE | COMMUNITY
Start: 2022-01-12 | End: 1900-01-01

## 2022-01-12 RX ADMIN — Medication 8 UNIT(S): at 12:26

## 2022-01-12 RX ADMIN — Medication 975 MILLIGRAM(S): at 11:24

## 2022-01-12 RX ADMIN — GABAPENTIN 600 MILLIGRAM(S): 400 CAPSULE ORAL at 13:38

## 2022-01-12 RX ADMIN — Medication 10 UNIT(S): at 15:55

## 2022-01-12 RX ADMIN — BUPRENORPHINE AND NALOXONE 1 TABLET(S): 2; .5 TABLET SUBLINGUAL at 11:24

## 2022-01-12 RX ADMIN — Medication 1 TABLET(S): at 11:24

## 2022-01-12 RX ADMIN — Medication 25 GRAM(S): at 05:07

## 2022-01-12 RX ADMIN — Medication 975 MILLIGRAM(S): at 05:07

## 2022-01-12 RX ADMIN — LISINOPRIL 10 MILLIGRAM(S): 2.5 TABLET ORAL at 05:07

## 2022-01-12 RX ADMIN — Medication 0.5 MILLIGRAM(S): at 13:38

## 2022-01-12 RX ADMIN — ENOXAPARIN SODIUM 40 MILLIGRAM(S): 100 INJECTION SUBCUTANEOUS at 11:25

## 2022-01-12 RX ADMIN — Medication 1 MILLIGRAM(S): at 05:07

## 2022-01-12 RX ADMIN — GABAPENTIN 600 MILLIGRAM(S): 400 CAPSULE ORAL at 05:08

## 2022-01-12 RX ADMIN — Medication 100 MILLIGRAM(S): at 11:23

## 2022-01-12 RX ADMIN — Medication 1 MILLIGRAM(S): at 11:26

## 2022-01-12 RX ADMIN — Medication 975 MILLIGRAM(S): at 00:45

## 2022-01-12 RX ADMIN — PANTOPRAZOLE SODIUM 40 MILLIGRAM(S): 20 TABLET, DELAYED RELEASE ORAL at 05:07

## 2022-01-12 RX ADMIN — Medication 10 UNIT(S): at 15:14

## 2022-01-12 NOTE — PROGRESS NOTE ADULT - SUBJECTIVE AND OBJECTIVE BOX
Reason for Endocrinology Consult: Diabetes    HPI: 51y Male      Outpatient Endocrinologist?    PAST MEDICAL & SURGICAL HISTORY:  HTN (hypertension)    DM (diabetes mellitus)    Alcohol dependency    Diabetes    History of incision and drainage      FAMILY HISTORY:  FH: alcohol abuse  dad        SH:  Smoking  Etoh:  Recreational Drugs:    Home Medications:  folic acid 1 mg oral tablet: 1 tab(s) orally once a day (12 Jan 2022 11:53)  lisinopril: 10 milligram(s) orally once a day (12 Jan 2022 11:53)  Multiple Vitamins oral tablet: 1 tab(s) orally once a day (12 Jan 2022 11:53)  senna oral tablet: 2 tab(s) orally once a day (at bedtime) (12 Jan 2022 11:53)  thiamine 100 mg oral tablet: 1 tab(s) orally once a day (12 Jan 2022 11:53)      Current (Non-Endocrine) Meds:  acetaminophen     Tablet .. 975 milliGRAM(s) Oral every 6 hours  buprenorphine 8 mG/naloxone 2 mG SL  Tablet 1 Tablet(s) SubLingual daily  enoxaparin Injectable 40 milliGRAM(s) SubCutaneous daily  folic acid 1 milliGRAM(s) Oral daily  gabapentin 600 milliGRAM(s) Oral every 8 hours  influenza   Vaccine 0.5 milliLiter(s) IntraMuscular once  lidocaine   4% Patch 1 Patch Transdermal every 24 hours  lisinopril 10 milliGRAM(s) Oral daily  LORazepam     Tablet   Oral   LORazepam     Tablet 0.5 milliGRAM(s) Oral every 8 hours  multivitamin 1 Tablet(s) Oral daily  pantoprazole    Tablet 40 milliGRAM(s) Oral before breakfast  senna 2 Tablet(s) Oral at bedtime  thiamine 100 milliGRAM(s) Oral daily      Current Endocrine Meds:   insulin glargine Injectable (LANTUS) 25 Unit(s) SubCutaneous every morning  insulin lispro (ADMELOG) corrective regimen sliding scale   SubCutaneous three times a day before meals  insulin lispro Injectable (ADMELOG) 8 Unit(s) SubCutaneous three times a day before meals  insulin lispro Injectable (ADMELOG). 10 Unit(s) SubCutaneous once      Allergies:  No Known Allergies      ROS:  Denies the following except as indicated.    General: weight loss/weight gain, decreased appetite, fatigue, fever  Eyes: blurry vision, double vision  ENT: neck swelling, dysphagia, voice changes   CV: palpitations, SOB, chest pain, cough  GI: nausea, vomiting, diarrhea, constipation, abdominal pain  : nocturia,  polyuria, dysuria  Endo: decreased libido, heat/cold intolerance, jitteriness  MSK: arthralgias, myalgias  Skin: rash, dryness, diaphoresis  Neuro: pedal numbness,pedal paresthesias, pedal pain    Height (cm): 185.4 (01-10 @ 16:01), 185.4 (01-07 @ 17:35), 185.4 (01-06 @ 22:28)  Weight (kg): 118.1 (01-10 @ 16:01), 115.7 (01-07 @ 17:35), 106.1 (01-06 @ 22:28)  BMI (kg/m2): 34.4 (01-10 @ 16:01), 33.7 (01-07 @ 17:35), 30.9 (01-06 @ 22:28)    Vital Signs Last 24 Hrs  T(C): 37.3 (12 Jan 2022 13:28), Max: 37.3 (12 Jan 2022 13:28)  T(F): 99.2 (12 Jan 2022 13:28), Max: 99.2 (12 Jan 2022 13:28)  HR: 79 (12 Jan 2022 13:28) (66 - 80)  BP: 153/70 (12 Jan 2022 13:28) (131/61 - 164/93)  BP(mean): --  RR: 18 (12 Jan 2022 13:28) (18 - 18)  SpO2: 98% (12 Jan 2022 13:28) (95% - 100%)  Constitutional: WN/WD in NAD.   Neck: no thyromegaly or palpable thyroid nodules   Respiratory: lungs CTAB.  Cardiovascular: regular rate and rhythm, normal S1 and S2, no audible murmurs  GI: soft, NT/ND, no masses/HSM appreciated.  Ext: no edema, no ulcers, pedal pulses palpable bilaterally  Neurology: no tremor, monofilament sensation intact in feet  Psychiatric: A&O x 3, normal affect/mood.        LABS:                        11.2   3.02  )-----------( 44       ( 11 Jan 2022 20:00 )             33.0     01-11    134<L>  |  101  |  13  ----------------------------<  159<H>  4.9   |  22  |  0.7    Ca    8.3<L>      11 Jan 2022 20:00  Phos  3.4     01-11  Mg     1.8     01-11                                         RADIOLOGY & ADDITIONAL STUDIES:    Above discussed with resident.

## 2022-01-12 NOTE — DISCHARGE NOTE PROVIDER - NSFOLLOWUPCLINICS_GEN_ALL_ED_FT
[facility]  Trauma Surgery  [address] [place] [address]  Phone: [phone]  Fax:   Follow Up Time: 2 weeks

## 2022-01-12 NOTE — DISCHARGE NOTE PROVIDER - NSDCFUADDINST_GEN_ALL_CORE_FT
SURGERY DISCHARGE INSTRUCTIONS    FOLLOW-UP - with Trauma Clinic and, Dr. Marine Bhandari  in  1-2 weeks. Call the office to make an appointment or if you have any questions/concerns.    DIET - Regular DASH diet.     ACTIVITY- No heavy lifting for 4-6 wks over 10-20 lbs. Walking is encouraged. No running or swimming. No driving while taking pain medication.    PAIN MEDS -  Take over the counter extra strength Tylenol 950mg and/or ibuprofen 400mg with food every 6 hours for pain instead of prescription pain meds if you do not need stronger pain control. No more than 4g of Tylenol in 24hrs or 1g in 4 hrs. No mixing alcohol with prescription pain meds. No driving or operating machinery while taking prescription pain meds. Drink plenty of water and increase your fiber intake (unless your diet restricts fiber) while taking prescription pain meds as these can cause constipation and abdominal straining. If you do not have a bowel movement in 3 days take an over the counter stool softener of your choice daily. If you still do not have a bowel movement the following 2 days call your primary care physician.    OTHER MEDS - If you have any questions about your other regular home medications please call your primary care physician or the physician who prescribed those medications to you.     If you develop fever, dizziness, chest pain, trouble breathing, nausea, vomiting, increasing abdominal pain, inability to pass bowel movements, redness/pain/discharge from incisions. Please call the office or go to the emergency room immediately.

## 2022-01-12 NOTE — DISCHARGE NOTE NURSING/CASE MANAGEMENT/SOCIAL WORK - NSDCPEEMAIL_GEN_ALL_CORE
[facility] for Tobacco Control email [email or web address]

## 2022-01-12 NOTE — DISCHARGE NOTE PROVIDER - CARE PROVIDERS DIRECT ADDRESSES
[email or web address]

## 2022-01-12 NOTE — DISCHARGE NOTE PROVIDER - CARE PROVIDER_API CALL
Brandyn Bhandari  INTERNAL MEDICINE  [address] [place] [address]  Phone: [phone]  Fax: [phone]  Follow Up Time: 1 week   Brandyn Bhandari  INTERNAL MEDICINE  [address] [facility] [place] [address]  Phone: [phone]  Fax: [phone]  Follow Up Time: 1 week    Fady Hawthorne)  Surgery  Neurosurgery  [address] [place] [address], [address]  [place] [address]  Phone: [phone]  Fax: [phone]  Follow Up Time: 1 week

## 2022-01-12 NOTE — PROGRESS NOTE ADULT - ASSESSMENT
ASSESSMENT:  49M PMHx  HTN, DM Type 2, Suboxone use, alcohol abuse, alcohol withdrawal  s/p mechanical trip and fall, intoxicated, with acute Left sided 6-9th Rib fxs, and T8 vertebral body fx    PLAN:   - Continue pain management  - Continue CIWA  - Physiatry: home with outpatient VNS  - GI/DVT prophylaxis  - Encourage ambulation  - Repletions as needed  - F/U SW/CM for discharge planning for safe DC    Lines/Tubes: PIV    TRAUMA SPECTRA: [id number]

## 2022-01-12 NOTE — DISCHARGE NOTE NURSING/CASE MANAGEMENT/SOCIAL WORK - NSDCVIVACCINE_GEN_ALL_CORE_FT
influenza, injectable, quadrivalent, preservative free; 17-Oct-2019 12:50; Rose Oliveros (RN); [employer]com[employer]; 3bs44 (Exp. Date: 30-Jun-2020); IntraMuscular; Deltoid Left.; 0.5 milliLiter(s); VIS (VIS Published: 15-Aug-2019, VIS Presented: 17-Oct-2019);   Tdap; 11-Oct-2019 08:07; Clark Rodriguez); [employer]; [id number] (Exp. Date: 22-Oct-2021); IntraMuscular; Deltoid Left.; 0.5 milliLiter(s); VIS (VIS Published: 09-May-2013, VIS Presented: 11-Oct-2019);

## 2022-01-12 NOTE — DISCHARGE NOTE PROVIDER - HOSPITAL COURSE
Pt is a 50 yo M with a hx of DM, prior opioid abuse on suboxone, & active alcohol abuse who presents as a trauma transfer from [facility] for multiple rib fractures. Pt reports he was seen in the ER yesterday at the request of his sister due to alcohol abuse; he was discharged home but continued to drink and had a trip and fall last night for which his sister reportedly called an ambulance. In the ED Pt had stable vital signs and had a complete trauma work-up which found left sided rib fractures 6, 7, 8, & 9 as well as T8 vertebral body fracture. Pt was admitted to the step down unit for close hemodynamic monitoring and ETOH withdrawal. In the step down unit  on hospital day 1  the pt was resuscitated with fluids. Neurosurgery saw the pt for the T8 fracture and stated that nothing to do. On hospital day 2 pt's pain medicine was increased and started on a PO Ativan taper, and downgraded. On the floor pt insulin requirements increased by endocrine otherwise pt has had no acute events. Pt has been stable, pulling 3000L on IS, pain well controlled and is stable and ready for discharge.

## 2022-01-12 NOTE — DISCHARGE NOTE NURSING/CASE MANAGEMENT/SOCIAL WORK - NSDCPEWEB_GEN_ALL_CORE
[facility] for Tobacco Control website --- [email or web address] website --- [email or web address]

## 2022-01-12 NOTE — PROGRESS NOTE ADULT - ATTENDING COMMENTS
Trauma/ACS Attending  Note Attestation    Patient is examined and evaluated at the bedside with the residents/PAs. Treatment plan discussed with the team, nurses, and consulting physicians and consulting teams. Medications, radiological studies and all other relevant studies reviewed.     GINO STEWART Patient is a 51y old  Male who presents with a chief complaint of multiple rib fractures, alcohol intoxication      Vital Signs Last 24 Hrs  T(C): 36.8 (09 Jan 2022 00:00), Max: 36.9 (08 Jan 2022 16:00)  T(F): 98.2 (09 Jan 2022 00:00), Max: 98.5 (08 Jan 2022 16:00)  HR: 85 (09 Jan 2022 00:00) (70 - 88)  BP: 125/62 (09 Jan 2022 00:00) (125/62 - 143/78)  BP(mean): 92 (08 Jan 2022 16:00) (92 - 92)  RR: 18 (09 Jan 2022 00:00) (18 - 20)  SpO2: 100% (09 Jan 2022 00:00) (96% - 100%)                        10.6   2.74  )-----------( 35       ( 09 Jan 2022 01:28 )             31.3     01-09    133<L>  |  96<L>  |  13  ----------------------------<  176<H>  4.3   |  23  |  0.5<L>    Ca    8.2<L>      09 Jan 2022 01:28  Phos  3.2     01-09  Mg     1.8     01-09    TPro  5.7<L>  /  Alb  3.3<L>  /  TBili  2.0<H>  /  DBili  1.2<H>  /  AST  101<H>  /  ALT  38  /  AlkPhos  88  01-09      Diagnosis: fall with multiple ribs fractures    Plan:	  - DT pro[hylaxis -> CIWA  - supportive care  - pain management  - incentive spirometer   - DVT prophylaxis       [ x] SCDs [ x] Lovenox [ ] Heparins SQ  [ ] None  - GI prophylaxis  - follow up consults  - repeat studies as needed  - PT evaluation and follow up  - replace electrolytes  - case management evaluation     Mehnaz Anglin MD, FACS  Trauma/ACS/Surgical Critical Care Attending
continue PT  Incentive spirometer  DVT prophylaxis   for discharge planning
pain control  Incentive spirometer  PT   for discharge planning.  SDU management today.
patient seen, doing well, pain under control. on room air. hematology consult for low platelet count (chronic).
patient stable clinically.  On IS does 3000 ml  Pain is controlled.    ASSESSMENT:  50 y/o male, S/P Fall.  Closed Left 6,7,8,9 Rib Fractures.  Acute pain due to trauma.  Chronic alcohol abuse.    PLAN:  - IS  - OOB  - DVT prophylaxis  - on CIWA  -  for discharge planning
52yo male with PMH DM-II, neuropathy, HTN and substance abuse/ETOH abuse (on Suboxone, uses tobacco), drinks 2 pints of vodka per day, s/p mechanical fall, ETOH level 380, with acute Left sided 6-9th rib fxs, T8 vertebral body fx.       #h/o ETOH/substance abuse  -resume home suboxone  -CIWA protocol, Ativan if necessary for DTs, thiamine , folate, daily vitamins   #T8 vertebral body fx -->no acute neurosurgical intervention, pain control    -Acute pain-controlled with tylenol ATC and Ibuprofen  -continue Gabapentin 300mg q8  -Lidoderm patch       # Left sided 6-9th rib fxs  -pain control  -satting well on RA  - IS pulling 1.5-2L  -f/up CXR  -  Activity- OOB        #HTN  - holding home Lisinopril 2/2 borderline BP, restarted this am  -f/up ECG  -->757 > 466 > 347

## 2022-01-12 NOTE — PROGRESS NOTE ADULT - ASSESSMENT
obesity, type 2 diabetes, try metformin 1g. twice daily, pioglitazone 30 mg. daily [facility] phone number . also ozempic 0.5 mg. weekly. patient will follow up with primary care.

## 2022-01-12 NOTE — DISCHARGE NOTE NURSING/CASE MANAGEMENT/SOCIAL WORK - NSDCPEFALRISK_GEN_ALL_CORE
For information on Fall & Injury Prevention, visit: [email or web address] OR  [email or web address] OR  https://www.cdc.gov/steadi/patient.html

## 2022-01-12 NOTE — PROGRESS NOTE ADULT - SUBJECTIVE AND OBJECTIVE BOX
TRAUMA SURGERY PROGRESS NOTE    Patient: GINO STEWART , 51y ([birth year])Male   MRN: [id number]  Location: [address]  Visit: 01-08-22 Inpatient    Hospital Day #: 5    Procedure/Dx/Injuries: 51M  s/p mechanical trip and fall, intoxicated, with acute Left sided 6-9th Rib fxs, and T8 vertebral body fx    Events of past 24 hours:  Patient seen and examined at bedside. No acute events, no complaints. CIWA and SBIRT.    Bowel Movement: : [] YES [x] NO  Flatus: : [x] YES [] NO    PHYSICAL EXAM:  General: NAD  HEENT: EOMI, no scalp lacerations   Neck: Soft, midline trachea. no c-spine tenderness  Chest: chest wall tenderness, no subcutaneous emphysema   Cardiac:: RRR  Respiratory: Bilateral breath sounds, clear and equal bilaterally, IS 3000  Abdomen: Soft, non-distended, non-tender, no rebound, no guarding.      Vitals:   T(F): 98.4 (01-12-22 @ 01:00), Max: 98.4 (01-12-22 @ 01:00)  HR: 74 (01-12-22 @ 01:00)  BP: 149/78 (01-12-22 @ 01:00)  RR: 18 (01-12-22 @ 01:00)  SpO2: 100% (01-12-22 @ 01:00)      Diet, DASH/TLC:   Sodium & Cholesterol Restricted  Consistent Carbohydrate No Snacks      Fluids:  I & O's:    01-10-22 @ 07:01  -  01-11-22 @ 07:00  --------------------------------------------------------  IN:  Total IN: 0 mL    OUT:    Voided (mL): 3000 mL  Total OUT: 3000 mL    Total NET: -3000 mL      MEDICATIONS  (STANDING):  acetaminophen     Tablet .. 975 milliGRAM(s) Oral every 6 hours  buprenorphine 8 mG/naloxone 2 mG SL  Tablet 1 Tablet(s) SubLingual daily  enoxaparin Injectable 40 milliGRAM(s) SubCutaneous daily  folic acid 1 milliGRAM(s) Oral daily  gabapentin 600 milliGRAM(s) Oral every 8 hours  influenza   Vaccine 0.5 milliLiter(s) IntraMuscular once  insulin glargine Injectable (LANTUS) 25 Unit(s) SubCutaneous every morning  insulin lispro Injectable (ADMELOG) 8 Unit(s) SubCutaneous three times a day before meals  insulin regular  human corrective regimen sliding scale   SubCutaneous Before meals and at bedtime  lidocaine   4% Patch 1 Patch Transdermal every 24 hours  lisinopril 10 milliGRAM(s) Oral daily  LORazepam     Tablet 1 milliGRAM(s) Oral every 8 hours  LORazepam     Tablet   Oral   LORazepam     Tablet 0.5 milliGRAM(s) Oral every 8 hours  magnesium sulfate  IVPB 2 Gram(s) IV Intermittent once  multivitamin 1 Tablet(s) Oral daily  pantoprazole    Tablet 40 milliGRAM(s) Oral before breakfast  senna 2 Tablet(s) Oral at bedtime  thiamine 100 milliGRAM(s) Oral daily    DVT PROPHYLAXIS: enoxaparin Injectable 40 milliGRAM(s) SubCutaneous daily  GI PROPHYLAXIS: pantoprazole    Tablet 40 milliGRAM(s) Oral before breakfast    LAB/STUDIES:  Labs:  CAPILLARY BLOOD GLUCOSE  POCT Blood Glucose.: 138 mg/dL (11 Jan 2022 20:31)  POCT Blood Glucose.: 176 mg/dL (11 Jan 2022 16:28)  POCT Blood Glucose.: 166 mg/dL (11 Jan 2022 11:29)  POCT Blood Glucose.: 153 mg/dL (11 Jan 2022 07:40)                          11.2   3.02  )-----------( 44       ( 11 Jan 2022 20:00 )             33.0       Auto Neutrophil %: 55.4 % (01-11-22 @ 20:00)  Auto Immature Granulocyte %: 0.3 % (01-11-22 @ 20:00)  Auto Neutrophil %: 54.8 % (01-11-22 @ 11:00)  Auto Immature Granulocyte %: 0.3 % (01-11-22 @ 11:00)    01-11    134<L>  |  101  |  13  ----------------------------<  159<H>  4.9   |  22  |  0.7      Calcium, Total Serum: 8.3 mg/dL (01-11-22 @ 20:00)     ABG - ( 08 Jan 2022 21:12 )  pH: 7.47  /  pCO2: 39    /  pO2: 74    / HCO3: 28    / Base Excess: 4.5   /  SaO2: 97.2        TRAUMA TEAM SPECTRA #[id number]

## 2022-01-12 NOTE — DISCHARGE NOTE PROVIDER - NSDCCPCAREPLAN_GEN_ALL_CORE_FT
PRINCIPAL DISCHARGE DIAGNOSIS  Diagnosis: Multiple fractures of ribs of left side  Assessment and Plan of Treatment:       SECONDARY DISCHARGE DIAGNOSES  Diagnosis: Closed fracture of thoracic vertebral body  Assessment and Plan of Treatment:     Diagnosis: Rhabdomyolysis  Assessment and Plan of Treatment:     Diagnosis: Alcohol intoxication  Assessment and Plan of Treatment:

## 2022-01-12 NOTE — DISCHARGE NOTE NURSING/CASE MANAGEMENT/SOCIAL WORK - PATIENT PORTAL LINK FT
You can access the FollowMyHealth Patient Portal offered by [facility] by registering at the following website: [email or web address]. By joining [employer]’s FollowMyHealth portal, you will also be able to view your health information using other applications (apps) compatible with our system.

## 2022-01-12 NOTE — PROGRESS NOTE ADULT - REASON FOR ADMISSION
multiple rib fractures

## 2022-01-12 NOTE — DISCHARGE NOTE PROVIDER - PROVIDER TOKENS
PROVIDER:[TOKEN:[[id number]],FOLLOWUP:[1 week]] PROVIDER:[TOKEN:[[id number]],FOLLOWUP:[1 week]],PROVIDER:[TOKEN:[[id number]],FOLLOWUP:[1 week]]

## 2022-01-12 NOTE — DISCHARGE NOTE PROVIDER - NSDCMRMEDTOKEN_GEN_ALL_CORE_FT
acetaminophen 325 mg oral tablet: 3 tab(s) orally every 6 hours  buprenorphine-naloxone 8 mg-2 mg sublingual tablet: 1 tab(s) sublingually once a day MDD:1 tablet  folic acid 1 mg oral tablet: 1 tab(s) orally once a day  gabapentin 300 mg oral capsule: 1 cap(s) orally every 8 hours   lisinopril: 10 milligram(s) orally once a day  metFORMIN 500 mg oral tablet: 1 tab(s) orally 2 times a day   Multiple Vitamins oral tablet: 1 tab(s) orally once a day  senna oral tablet: 2 tab(s) orally once a day (at bedtime)  thiamine 100 mg oral tablet: 1 tab(s) orally once a day

## 2022-01-27 DIAGNOSIS — D69.59 OTHER SECONDARY THROMBOCYTOPENIA: ICD-10-CM

## 2022-01-27 DIAGNOSIS — E11.65 TYPE 2 DIABETES MELLITUS WITH HYPERGLYCEMIA: ICD-10-CM

## 2022-01-27 DIAGNOSIS — I10 ESSENTIAL (PRIMARY) HYPERTENSION: ICD-10-CM

## 2022-01-27 DIAGNOSIS — F10.239 ALCOHOL DEPENDENCE WITH WITHDRAWAL, UNSPECIFIED: ICD-10-CM

## 2022-01-27 DIAGNOSIS — M62.82 RHABDOMYOLYSIS: ICD-10-CM

## 2022-01-27 DIAGNOSIS — Z20.822 CONTACT WITH AND (SUSPECTED) EXPOSURE TO COVID-19: ICD-10-CM

## 2022-01-27 DIAGNOSIS — S22.42XA MULTIPLE FRACTURES OF RIBS, LEFT SIDE, INITIAL ENCOUNTER FOR CLOSED FRACTURE: ICD-10-CM

## 2022-01-27 DIAGNOSIS — R29.6 REPEATED FALLS: ICD-10-CM

## 2022-01-27 DIAGNOSIS — F19.10 OTHER PSYCHOACTIVE SUBSTANCE ABUSE, UNCOMPLICATED: ICD-10-CM

## 2022-01-27 DIAGNOSIS — Z79.84 LONG TERM (CURRENT) USE OF ORAL HYPOGLYCEMIC DRUGS: ICD-10-CM

## 2022-01-27 DIAGNOSIS — S22.069A UNSPECIFIED FRACTURE OF T7-T8 VERTEBRA, INITIAL ENCOUNTER FOR CLOSED FRACTURE: ICD-10-CM

## 2022-01-27 DIAGNOSIS — E11.40 TYPE 2 DIABETES MELLITUS WITH DIABETIC NEUROPATHY, UNSPECIFIED: ICD-10-CM
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
## 2022-01-27 DIAGNOSIS — F10.229 ALCOHOL DEPENDENCE WITH INTOXICATION, UNSPECIFIED: ICD-10-CM

## 2022-01-27 DIAGNOSIS — Z79.891 LONG TERM (CURRENT) USE OF OPIATE ANALGESIC: ICD-10-CM

## 2022-01-27 DIAGNOSIS — F11.20 OPIOID DEPENDENCE, UNCOMPLICATED: ICD-10-CM

## 2022-01-27 DIAGNOSIS — K74.60 UNSPECIFIED CIRRHOSIS OF LIVER: ICD-10-CM

## 2022-11-14 NOTE — CHART NOTE - NSCHARTNOTEFT_GEN_A_CORE
Patient Glucose is under 200 and meets criteria for discharge. Plan discussed with attending, patient and will have glycemic control outpatient with endocrinologist. No

## 2023-08-02 NOTE — ED ADULT NURSE NOTE - TEMPLATE LIST FOR HEAD TO TOE ASSESSMENT
Fall Bactrim Counseling:  I discussed with the patient the risks of sulfa antibiotics including but not limited to GI upset, allergic reaction, drug rash, diarrhea, dizziness, photosensitivity, and yeast infections.  Rarely, more serious reactions can occur including but not limited to aplastic anemia, agranulocytosis, methemoglobinemia, blood dyscrasias, liver or kidney failure, lung infiltrates or desquamative/blistering drug rashes.

## 2024-09-18 NOTE — ED PROVIDER NOTE - OBJECTIVE STATEMENT
50 yo M with PMH of DM, HTN, polysubstance abuse on suboxone, EtOH drinks ~2 fifths of vodka daily BIBEMS after being found down. Upon arrival, patient alert and oriented. Endorses drinking a fifth of vodka tonight and then remembers waking up outside. Unknown HT, LOC. Denies AC, numbness, weakness, tingling, headache, vision changes, dizziness, CP, SOB, NVD, dysuria, hematuria, melena, hematochezia, fever, cold/flu symptoms.
[id number]

## 2024-11-18 NOTE — ED ADULT TRIAGE NOTE - MODE OF ARRIVAL
We talked about transitioning to an adult doctor  The Flu shot was given today  Continue with the prozac 40mg    [employer] Walk in